# Patient Record
Sex: MALE | Race: WHITE | NOT HISPANIC OR LATINO
[De-identification: names, ages, dates, MRNs, and addresses within clinical notes are randomized per-mention and may not be internally consistent; named-entity substitution may affect disease eponyms.]

---

## 2022-10-25 ENCOUNTER — TRANSCRIPTION ENCOUNTER (OUTPATIENT)
Age: 57
End: 2022-10-25

## 2022-10-25 ENCOUNTER — APPOINTMENT (OUTPATIENT)
Dept: PSYCHIATRY | Facility: CLINIC | Age: 57
End: 2022-10-25

## 2022-10-25 ENCOUNTER — OUTPATIENT (OUTPATIENT)
Dept: OUTPATIENT SERVICES | Facility: HOSPITAL | Age: 57
LOS: 1 days | Discharge: HOME | End: 2022-10-25

## 2022-10-25 DIAGNOSIS — Z81.8 FAMILY HISTORY OF OTHER MENTAL AND BEHAVIORAL DISORDERS: ICD-10-CM

## 2022-10-25 DIAGNOSIS — Z81.1 FAMILY HISTORY OF ALCOHOL ABUSE AND DEPENDENCE: ICD-10-CM

## 2022-10-25 DIAGNOSIS — Z86.79 PERSONAL HISTORY OF OTHER DISEASES OF THE CIRCULATORY SYSTEM: ICD-10-CM

## 2022-10-25 PROCEDURE — 90792 PSYCH DIAG EVAL W/MED SRVCS: CPT | Mod: 95,GC

## 2022-10-25 NOTE — EDUCATION
[Rationale for medication choices, possible risks/precautions, benefits, alternative treatment choices, and consequences of] : Rationale for medication choices, possible risks/precautions, benefits, alternative treatment choices, and consequences of non-treatment discussed with patient/family/caregiver

## 2022-10-25 NOTE — RISK ASSESSMENT
[Clinical Interview] : Clinical Interview [No] : No [Mood disorder] : mood disorder [Family Hx of suicide/suicidal behavior] : family history of suicide/suicidal behavior [None known] : None known [Identifies reasons for living] : identifies reasons for living [Supportive social network of family or friends] : supportive social network of family or friends [Responsibility to children, family, or others] : responsibility to children, family, or others [Beloved pets] : beloved pets [None in the patient's lifetime] : None in the patient's lifetime [None Known] : none known [No known risk factors] : No known risk factors [Residential stability] : residential stability [Relationship stability] : relationship stability [Sobriety] : sobriety

## 2022-10-26 NOTE — PLAN
[Admit to Program     (Add Program Admission information to a new column in the Admit/Discharge Flowsheet)] : Admit to program [Every ___ month(s)] : Medication Management: Every [unfilled] month(s) [Every ___ week(s)] : Psychotherapy: Every [unfilled] week(s) [Individual Therapy] : Individual Therapy [FreeTextEntry4] : -continue bupropion XL 300mg po daily (currently prescribed by PMD)\par -continue zolpidem 10mg po qhs PRN for insomnia (currently prescribed by PMD)\par -titrate off lamotrigine 25mg po daily given questionable benefit (currently prescribed by PMD)\par -f/u in 1 month, on 11/22/22 at 2:15PM

## 2022-10-26 NOTE — FAMILY HISTORY
[FreeTextEntry1] : Mother and father both . Mother  of suicide, was diagnosed as "manic depressive", underwent ECT, medications were unhelpful.

## 2022-10-26 NOTE — DISCUSSION/SUMMARY
[Low acute suicide risk] : Low acute suicide risk [No] : No [Not clinically indicated] : Safety Plan completed/updated (for individuals at risk): Not clinically indicated [FreeTextEntry1] : Patient's risk of self-harm is low due to his lack of suicidal ideation, no prior suicide attempts, mood stability, beloved pets, and access to care

## 2022-10-26 NOTE — HISTORY OF PRESENT ILLNESS
[Not Applicable] : Not applicable [FreeTextEntry1] : Nilson is a 57 year old white male, currently unemployed, former executive in cosmetics industry, domiciled with , with reported psychiatric history of bipolar disorder diagnosed in 2016, with no IPP admissions, presenting to clinic to re-establish psychiatric care after having last seen psychiatrist in 2017.\par \par He reports that he has been seeking out psychiatric care for several years, last saw psychiatrist in 2017 and PMD has been continuing his medications since then. Reports that he was diagnosed as bipolar in 2015 or 2016, started taking medication then. Reports that at time of diagnosis he had lost his job, his cat had , he felt down, found it hard to cope with day-to-day activities, was not caring for himself, not getting out of bed, neglecting hygiene, would either sleep poorly or oversleep. Reports that “manic episodes” would consist of him staying up, working through the night if he had a project to finish. Reports that the longest time he ever spent without sleep was 48 hours. He denies any episodes of persistently elevated mood or energy lasting at least 1 week or for any distinctive episodic period of time in his lifetime. He denies ever experiencing any symptoms suggestive of psychosis.\par \par He reports that starting medications was helpful with regards to his mood, has been on same medication regimen for several years without any changes. He does report that he has gained weight on his medications, and has noticed some changes in libido as well. \par He reports that over the past year he has had multiple stressors and “lots of life changes”, including recently buying an apartment and coordinating moving into it, his father passing away 2 weeks ago, marrying his partner of 20 years in order to avoid losing rights because of unstable political climate.\par \par Reports that he is currently looking for work, but is hesitant of failure, does not have a network of friends in [place], and is concerned about his professional reputation if he is seen working at a retail job when he was previously working at much higher level. 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 Reports sleep is often disrupted, frequently is worrying about different things when trying to go to sleep which impacts his ability to fall asleep, as well as waking up 2-3 times a night. Reports he is a “night owl” and often sleeps from about 3AM-10AM, perhaps 5-6 hours a night. Reports taking ½ pill of zolpidem about 2-3 times a week, which is helpful when he takes it. Reports feeling tired in the morning, drinks coffee to help, with overall energy level “steady”. Denies any appetite or concentration changes. Reports he continues to enjoy spending time with and caring for his cats. Reports some guilt about not working, about where he is financially. States that he uses checklists in order to get himself to complete tasks. He denies any suicidal or homicidal ideation. He denies any suicide attempts or self-harm.\par \par He reports he worries all the time, about things like his finances, cats, reputation. Reports ongoing muscle tension as well. Reports he also at times finds it difficult to stay on task because he is worrying instead. Reports feeling "frustrated, apprehensive, anxious". Reports 2 potential panic attacks in lifetime, last in 2016.\par He denies any symptoms suggestive of PTSD.\par \par He reports that he is interested in medication management and therapy. [FreeTextEntry2] : Dx with bipolar disorder in 2016, however no clear manic episode reported in lifetime. No IPP admissions in lifetime, saw 3 prior psychiatrists, last time was in 2017 - PMD continued prescribing psychiatric medications since then [FreeTextEntry3] : was first started on medications in 1775-8036: wellbutrin ER 300mg, lamotrigine 25 mg daily (started at 50mg, then brought down to 25mg), zolpidem 10mg PRN (1/2 of 1 PRN, says 30 days lasts 3 months. Denies any other medication trials.

## 2022-10-26 NOTE — PSYCHOSOCIAL ASSESSMENT
[Yes, during lifetime] : Yes, during lifetime [Other: _____] : [unfilled] [No] : Have you ever experienced this type of event? No [All substances negative except as specified below] : All substances negative except as specified below [_____] : Frequency: [unfilled] [Unemployed and looking for work] : unemployed and looking for work [Not applicable] : not applicable [Private residence (home, apartment, rooming house, hotel, motel, supported housing, supported Single Room Occupancy (SRO),] : Private residence (home, apartment, rooming house, hotel, motel, supported housing, supported Single Room Occupancy (SRO), permanent housing programs, transient housing programs, and shelter plus care housing) [Client's spouse or domestic partner] : client's spouse or domestic partner [FreeTextEntry1] : Reports he tried "party drugs" in youth but denies any substance use since or currently. Reports he drinks 1 glass of alcohol on occasion.

## 2022-10-26 NOTE — REASON FOR VISIT
[Home] : at home, [unfilled] , at the time of the visit. [Medical Office: ([facility])___] : at the medical office located in  [Self] : self [Patient] : Patient [FreeTextEntry5] : English [FreeTextEntry6] : Nilson [FreeTextEntry2] : medication management and therapy [FreeTextEntry1] : "I've been seeking psychiatric help for several years and haven't been able to get it"

## 2022-10-26 NOTE — SOCIAL HISTORY
[FreeTextEntry1] : Pt has bachelor's degree in history, worked in beauty/cosmetic industry for 30 years, worked at [employer] but currently unemployed. Works as election poll worker seasonally. , living with . Born [place] raised [place], moved to [place], then [place], then [place], then [place], then [place] in 2009. \par Denies any abuse history.\par

## 2022-10-27 ENCOUNTER — NON-APPOINTMENT (OUTPATIENT)
Age: 57
End: 2022-10-27

## 2022-10-27 NOTE — DISCUSSION/SUMMARY
[Initial Plan] : Initial Plan [Able to exercise self-direction] : able to exercise self-direction [Able to set and pursue goals] : able to set and pursue goals [Adherent to treatment recommendations] : adherent to treatment recommendations [Articulate] : articulate [Cognitively intact] : cognitively intact [Insightful] : insightful [Intelligent] : intelligent [Motivated to participate in treatment] : motivated to participate in treatment [Motivated to maintain or improve physical health] : motivated to maintain or improve physical health [In good health] : in good health [Financially stable] : financially stable [Part of a supportive marriage] : part of a supportive marriage [Housing stability] : housing stability [High level of education] : high level of education [English fluency] : English fluency [Connected to healthcare] : connected to healthcare [Mental Health] : Mental Health [Initial] : Initial [every ___ months] : every [unfilled] months [every ___ weeks] : every [unfilled] weeks [A change in level of care is needed as evidenced by:] : A change in level of care is needed as evidenced by: [Yes] : Yes [Psychiatric Provider/Prescriber] : Psychiatric Provider/Prescriber [None - Reason others did not participate:] : None - Reason others did not participate:  [FreeTextEntry2] : 1/30/23 [FreeTextEntry3] : 10/25/22 [FreeTextEntry5] : To have medications optimized and get back to therapy [FreeTextEntry1] : Depression/anxiety [FreeTextEntry4] : reduce feelings of depression and anxiety [de-identified] : complete symptomatic remission with no adjustment in medications over 1 year

## 2022-10-31 ENCOUNTER — APPOINTMENT (OUTPATIENT)
Dept: PSYCHIATRY | Facility: CLINIC | Age: 57
End: 2022-10-31

## 2022-10-31 PROCEDURE — 90834 PSYTX W PT 45 MINUTES: CPT

## 2022-10-31 NOTE — END OF VISIT
[Duration of Psychotherapy Visit (minutes spent in synchronous communication): ____] : Duration of Psychotherapy Visit (minutes spent in synchronous communication): [unfilled] [Individual Psychotherapy for 38-52 minutes] : Individual Psychotherapy for 38 - 52 minutes [Licensed Clinician] : Licensed Clinician

## 2022-11-01 NOTE — RISK ASSESSMENT
[FreeTextEntry8] : despite denying current SI, pt several times referenced not wanting to "become like his mother," who committed suicide 20 years ago. SI/thoughts of death should be further evaluated in subsequent sessions.  [FreeTextEntry7] : no evidence of current violent or homicidal ideation

## 2022-11-01 NOTE — REASON FOR VISIT
[Patient] : Patient [FreeTextEntry1] : Pt requests psychotherapy to focus on anhedonia, low motivation, feelings of "stuckness", processing his father's recent death, feelings of humiliation and guilt, perfectionism

## 2022-11-01 NOTE — PLAN
[Motivational Interviewing] : Motivational Interviewing  [Psychodynamic Therapy] : Psychodynamic Therapy  [Return in ____ week(s)] : Return in [unfilled] week(s) [FreeTextEntry2] : treatment will focus on supporting pt achieving his goals getting work, improving his mood, socializing more, decreasing perfectionism, and increasing motivation.  [de-identified] : Pt arrived early for first session, which focused on collecting information about how pt sees his current difficulties. Writer used open ended questions to elicit history of presenting problems, which pt was able to date back approximately 20 years. He reported a turbulent work history in which he was laid off multiple times after getting a new boss following a promotion. He reports perfectionism manifesting in a variety of ways and, with exploration, was able to see the ways in which this cognitive style can be both a strength and a liability likely contributing to his current psychological situation. Pt was highly engaged throughout session and was an active participant. He was able to make use of interpretation and was open to interventions. He asked relevant questions appropriately and was as forthcoming in responses as he could be. Writer provided psychoeducation on frame and expectations of psychotherapy and pt agreed.  [FreeTextEntry1] : Pt to engage in weekly psychotherapy with the possibility of more intensive treatment to be an ongoing discussion.

## 2022-11-01 NOTE — PHYSICAL EXAM
[Well groomed] : well groomed [Seductive] : seductive [Anxious] : anxious [Full] : full [Rapid] : rapid [Linear/Goal Directed] : linear/goal directed [Average] : average [WNL] : within normal limits [FreeTextEntry1] : very neat [de-identified] : reports wondering if his memory is getting worse, but unable to produce evidence. in fact, to the contrary, pt's memory seems above average.

## 2022-11-07 ENCOUNTER — APPOINTMENT (OUTPATIENT)
Dept: PSYCHIATRY | Facility: CLINIC | Age: 57
End: 2022-11-07

## 2022-11-07 PROCEDURE — 90834 PSYTX W PT 45 MINUTES: CPT

## 2022-11-07 NOTE — PHYSICAL EXAM
[Well groomed] : well groomed [Seductive] : seductive [Anxious] : anxious [Full] : full [Rapid] : rapid [Linear/Goal Directed] : linear/goal directed [Average] : average [WNL] : within normal limits [FreeTextEntry1] : very neat

## 2022-11-07 NOTE — RISK ASSESSMENT
[No, patient denies ideation or behavior] : No, patient denies ideation or behavior [Yes] : Yes [No] : No [Yes, more than three months ago] : Yes, more than three months ago [FreeTextEntry8] : d [FreeTextEntry7] : no evidence of current violent or homicidal ideation [TextBox_32] : pt denied a thoughts of suicide since 2018. reports that at that time, after he lost his job, had a number of surgeries, and his long-time cat , he had some thoughts that others might be better off if he were dead. Reports he ruled out using a gun or a knife because "they would be too messy" but denied have any actual plans to take his life. he reports that he at that time he did think about how he would give away his watch collection i.e., to whom he might give various watches. However, he never had active thoughts of suicide. he reports that he was off his psychiatric meds at that time, and since going back on them, he has not had any suicidal ideation.

## 2022-11-07 NOTE — PLAN
[Motivational Interviewing] : Motivational Interviewing  [Psychodynamic Therapy] : Psychodynamic Therapy  [Return in ____ week(s)] : Return in [unfilled] week(s) [FreeTextEntry2] : treatment will focus on supporting pt achieving his goals getting work, improving his mood, socializing more, decreasing perfectionism, and increasing motivation.  [de-identified] : Pt discussed his fears of becoming like his mother, noting similarities in terms of her depression, their similar looks, her way of telling stories, his weight gain, and his occasional lethargy and not leaving the house. Pt discussed his vocational history and current precarious financial situation and expressed ambivalence about applying for a new job. 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 Writer provided safe space for exploration, encouraged pt to vocalize any suicidal ideation that may come up in the future, helped pt to clarify his ambivalence about getting a job and see the ways in which he wants and does not want to get a job.  Pt was forthcoming and engaged throughout session, vocalizing appreciation for writer's time.  [FreeTextEntry1] : Pt to engage in weekly psychotherapy. Possibility that pt is avoiding painful emotions should be explored.

## 2022-11-10 DIAGNOSIS — F31.9 BIPOLAR DISORDER, UNSPECIFIED: ICD-10-CM

## 2022-11-14 ENCOUNTER — APPOINTMENT (OUTPATIENT)
Dept: PSYCHIATRY | Facility: CLINIC | Age: 57
End: 2022-11-14

## 2022-11-14 PROCEDURE — 90834 PSYTX W PT 45 MINUTES: CPT

## 2022-11-14 NOTE — END OF VISIT
[Individual Psychotherapy for 38-52 minutes] : Individual Psychotherapy for 38 - 52 minutes [Licensed Clinician] : Licensed Clinician [Duration of Psychotherapy Visit (minutes spent in synchronous communication): ____] : Duration of Psychotherapy Visit (minutes spent in synchronous communication): [unfilled]

## 2022-11-14 NOTE — PHYSICAL EXAM
[Well groomed] : well groomed [Average] : average [Anxious] : anxious [Full] : full [Rapid] : rapid [Linear/Goal Directed] : linear/goal directed [WNL] : within normal limits [Cooperative] : cooperative [FreeTextEntry8] : frustrated [de-identified] : tearful at times

## 2022-11-14 NOTE — RISK ASSESSMENT
[Yes] : Yes [No] : No [Yes, more than three months ago] : Yes, more than three months ago [TextBox_32] : pt denied a thoughts of suicide since 2018. reports that at that time, after he lost his job, had a number of surgeries, and his long-time cat , he had some thoughts that others might be better off if he were dead. Reports he ruled out using a gun or a knife because "they would be too messy" but denied have any actual plans to take his life. he reports that he at that time he did think about how he would give away his watch collection i.e., to whom he might give various watches. However, he never had active thoughts of suicide. he reports that he was off his psychiatric meds at that time, and since going back on them, he has not had any suicidal ideation. [FreeTextEntry8] : no evidence of risk of suicidality at this time [FreeTextEntry7] : no evidence of current violent or homicidal ideation

## 2022-11-14 NOTE — PLAN
[Psychodynamic Therapy] : Psychodynamic Therapy  [Return in ____ week(s)] : Return in [unfilled] week(s) [FreeTextEntry2] : treatment will focus on supporting pt achieving his goals getting work, improving his mood, socializing more, decreasing perfectionism, and increasing motivation. The possibility of 2x/weekly therapy should be a discussed.  [Psychoeducation] : Psychoeducation  [de-identified] : Pt discussed his frustration with the political climate, his recent experience as a poll worker, and his proneness to get frustrated quickly. Dyad discussed patient's harsh criticism and difficulty finding compassion for himself. He also discussed recent increases in frequency of crying over the past year. Pt discussed his avoidance of memories or media that might make him tearful, which was also true for his avoidance of discussing his father's recent death.  Pt expressed concern about upcoming apt with psychiatric provider and how to handle a prescription from a provider who would soon be transferring his care to another. Writer encouraged him to advocate for himself. Pt was cooperative and open to therapeutic inquiry. Session concluded with pt in good emotional control.  [FreeTextEntry1] : Continue weekly psychotherapy.

## 2022-11-21 ENCOUNTER — APPOINTMENT (OUTPATIENT)
Dept: PSYCHIATRY | Facility: CLINIC | Age: 57
End: 2022-11-21

## 2022-11-21 PROCEDURE — 90834 PSYTX W PT 45 MINUTES: CPT

## 2022-11-21 NOTE — RISK ASSESSMENT
[FreeTextEntry8] : no evidence of risk of suicidality at this time [FreeTextEntry7] : no evidence of current violent or homicidal ideation

## 2022-11-21 NOTE — PHYSICAL EXAM
[Well groomed] : well groomed [Average] : average [Cooperative] : cooperative [Anxious] : anxious [Full] : full [Rapid] : rapid [Linear/Goal Directed] : linear/goal directed [WNL] : within normal limits [FreeTextEntry8] : frustrated [de-identified] : tearful at times

## 2022-11-21 NOTE — PLAN
[FreeTextEntry2] : treatment will focus on supporting pt achieving his goals getting work, improving his mood, socializing more, decreasing perfectionism, and increasing motivation. possibility of 2x/weekly therapy was discussed.  [Psychodynamic Therapy] : Psychodynamic Therapy  [Psychoeducation] : Psychoeducation  [de-identified] : Pt arrived for session on time and wanted to discuss end of last session, in which he had inquired with writer about the nature of documentation. Dyad discussed the purposes of documentation and the ways in which it does and does not encapsulate the work of psychotherapy. Writer provided further psychoeducation on the nature of the frame of psychotherapy. Pt explored needs for structure and order in various contexts as well as his possible interpersonal impact on others, e.g., first impressions. Pt was cooperative and open to therapeutic inquiry. Session concluded with pt in good emotional control. Writer brought up possibility of 2x/week therapy and pt stated he would consider it and discuss next week.  [Return in ____ week(s)] : Return in [unfilled] week(s) [FreeTextEntry1] : Continue weekly psychotherapy.

## 2022-11-22 ENCOUNTER — APPOINTMENT (OUTPATIENT)
Dept: PSYCHIATRY | Facility: CLINIC | Age: 57
End: 2022-11-22

## 2022-11-22 NOTE — SOCIAL HISTORY
[FreeTextEntry1] : Pt has bachelor's degree in history, worked in beauty/cosmetic industry for 30 years, worked at [employer] but currently unemployed. Works as election poll worker seasonally. , living with . [place], moved to [place], then [place], then [place], then [place], then [place] in 2009. \par Denies any abuse history.\par

## 2022-11-22 NOTE — REASON FOR VISIT
[Home] : at home, [unfilled] , at the time of the visit. [Medical Office: ([facility])___] : at the medical office located in  [Self] : self [Patient] : Patient [FreeTextEntry2] : Chuy Luna

## 2022-11-22 NOTE — DISCUSSION/SUMMARY
[FreeTextEntry1] : Nilson is a 57 year old white male, currently unemployed, former executive in cosmetics industry, domiciled with , with reported psychiatric history of bipolar disorder diagnosed in 2016, with no IPP admissions, initially presented to clinic to re-establish psychiatric care after having last seen psychiatrist in 2017 for medication management as well as therapy, presenting today for follow-up.\par On initial evaluation, while patient presented with symptoms suggestive of generalized anxiety disorder, he does not appear acutely depressed, suicidal, manic, or psychotic. While patient reported diagnosis of bipolar disorder, this seems unlikely given no evidence of any lifetime manic episode, and atypical timing of reported initial presentation. Further, patient's medication regimen would not likely have been effective in treatment of bipolar disorder. Patient more likely has major depressive disorder, though can keep bipolar spectrum disorder on differential.\par Patient reports generally stable mood on current medication regimen though has been dealing with multiple recent psychosocial stressors. He is likely to benefit from continuing therapy in order to strengthen coping skills in the face of these stressors. Given unlikely benefit from very low dose of lamotrigine, will taper off and monitor response. Will trial PRN trazodone as alternative to PRN zolpidem to target insomnia, can start at low dose and titrate based on response and tolerability. Can consider SSRI trial in future if patient remains stable off of lamotrigine and mood/anxious symptoms persist. Patient remains appropriate for outpatient level of care at this time.

## 2022-11-22 NOTE — HISTORY OF PRESENT ILLNESS
[Not Applicable] : Not applicable [FreeTextEntry1] : Nilson reports that he has been enjoying therapy thus far. States he just got refill of medication from PMD, thus far did not change anything with regards to his medications, has continued to take lamictal 25mg daily since last visit. However, reports being agreeable to try coming off of it given questionable benefit.\par He denies any significant changes in his mood, though states therapy has been helpful to him thus far.\par Reports that he continues to have days where he will start doing something around the house, and will keep doing it for several hours. Will also do things like clean the kitchen to his liking even if his partner has already cleaned it.\par States that he is getting up earlier every day, has difficulty going back to sleep when he is up.\par Reports that he has taken any zolpidem for about 2 months, though talks in his sleep when he does take it, open to trying different medication to target sleep issues when he does get insomnia.\par Patient denies any symptoms suggestive of alicia or psychosis. Denies any suicidal or homicidal ideation at this time. \par Transition of care discussed. [FreeTextEntry2] : Dx with bipolar disorder in 2016, however no clear manic episode reported in lifetime. No IPP admissions in lifetime, saw 3 prior psychiatrists, last time was in 2017 - PMD continued prescribing psychiatric medications since then.  [FreeTextEntry3] : was first started on medications in [id number]: wellbutrin ER 300mg, lamotrigine 25 mg daily (started at 50mg, then brought down to 25mg), zolpidem 10mg PRN (1/2 of 1 PRN, says 30 days lasts 3 months. Denies any other medication trials.

## 2022-11-22 NOTE — PLAN
[Medication education provided] : Medication education provided. [Rationale for medication choices, possible risks/precautions, benefits, alternative treatment choices, and consequences of non-treatment discussed] : Rationale for medication choices, possible risks/precautions, benefits, alternative treatment choices, and consequences of non-treatment discussed with patient/family/caregiver  [Yes. details: ___] : Yes, [unfilled] [FreeTextEntry4] : improving mood [FreeTextEntry5] : -continue bupropion XL 300mg po daily (currently prescribed by PMD)\par -hold zolpidem 10mg po qhs PRN for insomnia (currently prescribed by PMD)\par -start trazodone 25-50mg po qhs PRN for insomnia\par -titrate off lamotrigine 25mg po daily given questionable benefit (currently prescribed by PMD)\par -f/u in ~1 month with new psychiatrist

## 2022-11-28 ENCOUNTER — APPOINTMENT (OUTPATIENT)
Dept: PSYCHIATRY | Facility: CLINIC | Age: 57
End: 2022-11-28

## 2022-11-28 PROCEDURE — 90834 PSYTX W PT 45 MINUTES: CPT

## 2022-11-28 NOTE — PHYSICAL EXAM
[Well groomed] : well groomed [Average] : average [Cooperative] : cooperative [Anxious] : anxious [Full] : full [Rapid] : rapid [Linear/Goal Directed] : linear/goal directed [WNL] : within normal limits [FreeTextEntry8] : frustrated [de-identified] : tearful at times

## 2022-11-28 NOTE — PLAN
[FreeTextEntry2] : treatment will focus on supporting pt achieving his goals getting work, improving his mood, socializing more, decreasing perfectionism, and increasing motivation. possibility of 2x/weekly therapy was discussed.  [Psychodynamic Therapy] : Psychodynamic Therapy  [Psychoeducation] : Psychoeducation  [de-identified] : Pt arrived for session five minutes late. He discussed his recent thanksgiving, which he spent at home with his partner. Session focused on discussion of traditions in his family of origin, and the meaning of his mother's suicide. Dyad also discussed patterns in pt's relationship of things going unsaid, both good and bad. Dyad processed pt's pattern of focusing on rules, structures, and logistics at the expense of emotions. Pt was cooperative and open to therapeutic inquiry. Session concluded with pt in good emotional control. Writer brought up possibility of 2x/week therapy and pt stated he still felt ambivalent and would discuss further next session.  [Return in ____ week(s)] : Return in [unfilled] week(s) [FreeTextEntry1] : Continue weekly psychotherapy.

## 2022-12-05 ENCOUNTER — APPOINTMENT (OUTPATIENT)
Dept: PSYCHIATRY | Facility: CLINIC | Age: 57
End: 2022-12-05

## 2022-12-05 PROCEDURE — 90834 PSYTX W PT 45 MINUTES: CPT

## 2022-12-05 NOTE — PHYSICAL EXAM
[Well groomed] : well groomed [Average] : average [Cooperative] : cooperative [Anxious] : anxious [Full] : full [Rapid] : rapid [Linear/Goal Directed] : linear/goal directed [WNL] : within normal limits [FreeTextEntry8] : frustrated [de-identified] : tearful at times

## 2022-12-05 NOTE — PLAN
[Psychodynamic Therapy] : Psychodynamic Therapy  [Psychoeducation] : Psychoeducation  [Return in ____ week(s)] : Return in [unfilled] week(s) [FreeTextEntry2] : treatment will focus on supporting pt achieving his goals getting work, improving his mood, socializing more, decreasing perfectionism, and increasing motivation. possibility of 2x/weekly therapy was discussed.  [de-identified] : Pt arrived for session. Began with discussion of 2x/wk therapy. With exploration, pt eventually stated that he did not want to do twice weekly therapy yet, that he didn't feel sufficiently comfortable yet. Writer validated pt and encouraged further honesty. Pt also discussed a propensity for being so moved by various movies/plays/musicals, that he is moved to uncontrollable tears. Dyad discussed patterns of being touched by the emotions of characters, particularly of being an only child and of feeling like he doesn't fit in with others due to his sexuality. Pt discussed the meaning he takes from caring for his cats and dyad discussed his difficulty caring for himself, and the ways in which he struggles to let his partner "down in the hole with him" when he is depressed. Pt was cooperative and open to therapeutic inquiry. Session concluded with pt in good emotional control.  [FreeTextEntry1] : Continue weekly psychotherapy.

## 2022-12-12 ENCOUNTER — APPOINTMENT (OUTPATIENT)
Dept: PSYCHIATRY | Facility: CLINIC | Age: 57
End: 2022-12-12

## 2022-12-14 ENCOUNTER — NON-APPOINTMENT (OUTPATIENT)
Age: 57
End: 2022-12-14

## 2022-12-14 ENCOUNTER — APPOINTMENT (OUTPATIENT)
Dept: PSYCHIATRY | Facility: CLINIC | Age: 57
End: 2022-12-14

## 2022-12-14 NOTE — DISCUSSION/SUMMARY
[FreeTextEntry1] : Spoke to patient after writer had to unexpectedly cancel our first apptmt.  Patient stating that he will be okay until we can meet at the end of December.  Spoke of how Lamictal had been stopped and he had tried Trazodone for sleep (instead of Ambien) but it made him feel very awake so will just use prn Ambiem that he has for now.  \par Will see Chuy Dec 29th at 2 pm\par

## 2022-12-19 ENCOUNTER — APPOINTMENT (OUTPATIENT)
Dept: PSYCHIATRY | Facility: CLINIC | Age: 57
End: 2022-12-19

## 2022-12-19 PROCEDURE — 90834 PSYTX W PT 45 MINUTES: CPT

## 2022-12-19 NOTE — PLAN
[FreeTextEntry2] : treatment will focus on supporting pt achieving his goals getting work, improving his mood, socializing more, decreasing perfectionism, and increasing motivation. possibility of 2x/weekly therapy was discussed.  [Psychodynamic Therapy] : Psychodynamic Therapy  [Psychoeducation] : Psychoeducation  [de-identified] : Pt arrived for session. Session focused on discussing pt's early developmental history and experiences of love after he found a quote about grief that resonated with him, "love with no place for it to go." Also discussed pt's reluctance to apply for jobs due to debilitating experiences of shame. Processed the vulnerability of applying for jobs that he is overqualified for and the exponentially worse shame of being rejected for one of those jobs. Pt was cooperative and open to therapeutic inquiry. Session concluded with pt in good emotional control.  [Return in ____ week(s)] : Return in [unfilled] week(s) [FreeTextEntry1] : Continue weekly psychotherapy.

## 2022-12-19 NOTE — PHYSICAL EXAM
[Well groomed] : well groomed [Average] : average [Cooperative] : cooperative [Anxious] : anxious [Full] : full [Rapid] : rapid [Linear/Goal Directed] : linear/goal directed [WNL] : within normal limits [FreeTextEntry8] : frustrated [de-identified] : tearful at times

## 2022-12-28 ENCOUNTER — APPOINTMENT (OUTPATIENT)
Dept: PSYCHIATRY | Facility: CLINIC | Age: 57
End: 2022-12-28

## 2022-12-28 PROCEDURE — 90834 PSYTX W PT 45 MINUTES: CPT | Mod: 95

## 2022-12-28 NOTE — PHYSICAL EXAM
[Well groomed] : well groomed [Average] : average [Cooperative] : cooperative [Anxious] : anxious [Full] : full [Rapid] : rapid [Linear/Goal Directed] : linear/goal directed [WNL] : within normal limits [FreeTextEntry8] : frustrated [de-identified] : tearful at times

## 2022-12-28 NOTE — PLAN
[FreeTextEntry2] : treatment will focus on supporting pt achieving his goals getting work, improving his mood, socializing more, decreasing perfectionism, and increasing motivation. possibility of 2x/weekly therapy was discussed.  [Psychodynamic Therapy] : Psychodynamic Therapy  [Psychoeducation] : Psychoeducation  [de-identified] : Pt arrived for session. Session focused on exploring pt's experience of self-consciousness and insecurity. Pt reports feeling isolated and struggling with friendships beyond his partner. Pt able to engage in superficial relationships but struggles to move them beyond these interactions into increased intimacy. Explored how this is related to his experience growing up as a "pasty, husky kid." Also explored how this increases the difficulty of trying to find a job due to the vulnerability and fear of failure. Dyad discussed trying to process pt's experience of being "on" in session with writer and how this might affect his experience of authenticity and intimacy in therapeutic relationship. Pt was cooperative and open to therapeutic inquiry. Session concluded with pt in good emotional control.  [Return in ____ week(s)] : Return in [unfilled] week(s) [FreeTextEntry1] : Continue weekly psychotherapy.

## 2022-12-28 NOTE — REASON FOR VISIT
[Home] : at home, [unfilled] , at the time of the visit. [Other Location: e.g. Home (Enter Location, City,State)___] : at [unfilled] [Self] : self [FreeTextEntry4] : Yrn Tucker, phd [Patient] : Patient [FreeTextEntry1] : Pt requests psychotherapy to focus on anhedonia, low motivation, feelings of "stuckness", processing his father's recent death, feelings of humiliation and guilt, perfectionism

## 2022-12-29 ENCOUNTER — APPOINTMENT (OUTPATIENT)
Dept: PSYCHIATRY | Facility: CLINIC | Age: 57
End: 2022-12-29
Payer: MEDICAID

## 2022-12-29 PROCEDURE — 99215 OFFICE O/P EST HI 40 MIN: CPT | Mod: 95

## 2022-12-29 PROCEDURE — 99417 PROLNG OP E/M EACH 15 MIN: CPT | Mod: 95

## 2022-12-29 RX ORDER — TRAZODONE HYDROCHLORIDE 50 MG/1
50 TABLET ORAL
Qty: 30 | Refills: 0 | Status: DISCONTINUED | COMMUNITY
Start: 2022-11-22 | End: 2022-12-29

## 2022-12-29 NOTE — REASON FOR VISIT
[Home] : at home, [unfilled] , at the time of the visit. [Patient] : the patient [Self] : self [Other Location: e.g. Home (Enter Location, City,State)___] : at [unfilled] [FreeTextEntry2] : Chuy Luna

## 2022-12-29 NOTE — DISCUSSION/SUMMARY
[FreeTextEntry1] : Nilson is a 57 year old white male, currently unemployed, former executive in cosmetics industry, domiciled with , with reported psychiatric history of bipolar disorder diagnosed in 2016, with no IPP admissions, initially presented to clinic to re-establish psychiatric care after having last seen psychiatrist in 2017 for medication management as well as therapy, presenting today for follow-up.\par On initial evaluation, while patient presented with symptoms suggestive of generalized anxiety disorder, he does not appear acutely depressed, suicidal, manic, or psychotic. While patient reported diagnosis of bipolar disorder, classic Bipolar Illness seemed unlikely given no evidence of any lifetime manic episode, and atypical timing of reported initial presentation.  Further, patient's medication regimen (Lamictal 25 mg daily) would not likely have been effective in treatment of Bipolar Disorder. Patient more likely has Major Depressive Disorder, though Cyclothymia or Bipolar Spectrum Disorder possible. \par Patient reports some dysphoria and anxiety as well as some fluctuating mood with periods of increased attention to projects and details.

## 2022-12-29 NOTE — PLAN
[Yes. details: ___] : Yes, [unfilled] [Medication education provided] : Medication education provided. [Rationale for medication choices, possible risks/precautions, benefits, alternative treatment choices, and consequences of non-treatment discussed] : Rationale for medication choices, possible risks/precautions, benefits, alternative treatment choices, and consequences of non-treatment discussed with patient/family/caregiver  [FreeTextEntry5] : Offered support/encouragement, psychoeducation, counseling regarding: diagnosis/diagnoses, medications, symptoms, recommendations etc.\par \par Reviewed/discussed plan below:\par \par -Increase Bupropion XL  to 450 mg po daily; writer awaiting information re: pt's pharmacy\par -May take Ambien 5 mg prn but only minimally at 1-2 times a week maximum\par -May hold Trazodone as pt stated he felt more awake on this\par -May hold stay off Lamictal though discussed consideration for restarting and titrating up further for mood stabilization due to some potential limited cycling\par -Pt to journal regarding moods, energy, sleep etc\par -COntinue weekly therapy\par -May collaborate with pt's therapist\par -Contact writer prn\par \par 60+ minutes spent reviewing prior records/notes, seeing patient and recording session note\par

## 2022-12-29 NOTE — HISTORY OF PRESENT ILLNESS
[Not Applicable] : Not applicable [FreeTextEntry1] : Nilson reports that he has some periods of anxiety and relative dysphoria and also times of feeling up and focused intently on getting something done.  States sometimes when he starts doing something he gets consumed for long periods and cannot stop.  Has some issues with restless or limited sleep but usually gets about 6 hrs a night per reports.  Stated his partner does not like when he takes Ambien (5 mg on occasion because he talks in his sleep when he takes it.\par He states he does not notice a difference off of Lamictal and discussed potential use of journaling with focus on moods and feelings as well as sleep quality etc to better pinpoint patterns.  \par   [FreeTextEntry2] : Dx with bipolar disorder in 2016, however no clear manic episode reported in lifetime. No IPP admissions in lifetime, saw 3 prior psychiatrists, last time was in 2017 - PMD continued prescribing psychiatric medications since then.  [FreeTextEntry3] : was first started on medications in [id number]: wellbutrin ER 300mg, lamotrigine 25 mg daily (started at 50mg, then brought down to 25mg), zolpidem 10mg PRN (1/2 of 1 PRN, says 30 days lasts 3 months. Denies any other medication trials.

## 2022-12-29 NOTE — REVIEW OF SYSTEMS
[Negative] : Allergic/Immunologic [FreeTextEntry9] : muscle tension [de-identified] : some anxiety and mood fluctuations

## 2022-12-29 NOTE — SOCIAL HISTORY
[FreeTextEntry1] : Pt has bachelor's degree in history, worked in beauty/cosmetic industry for 30 years, worked at [employer] but currently unemployed. Works as election poll worker seasonally. , living with . Born [place] raised [place], moved to [place], then [place], then [place], then [place], then [place] in 2009. \par Denies any abuse history.\par

## 2022-12-29 NOTE — PHYSICAL EXAM
[None] : none [Average] : average [Cooperative] : cooperative [Full] : full [Clear] : clear [Linear/Goal Directed] : linear/goal directed [WNL] : within normal limits [FreeTextEntry8] : stable

## 2022-12-30 RX ORDER — LAMOTRIGINE 25 MG/1
25 TABLET ORAL
Qty: 90 | Refills: 0 | Status: DISCONTINUED | COMMUNITY
Start: 2022-11-17 | End: 2022-12-30

## 2023-01-04 ENCOUNTER — APPOINTMENT (OUTPATIENT)
Dept: PSYCHIATRY | Facility: CLINIC | Age: 58
End: 2023-01-04
Payer: MEDICAID

## 2023-01-04 PROCEDURE — 90834 PSYTX W PT 45 MINUTES: CPT | Mod: 95

## 2023-01-04 NOTE — REASON FOR VISIT
[Home] : at home, [unfilled] , at the time of the visit. [Other Location: e.g. Home (Enter Location, City,State)___] : at [unfilled] [Patient] : the patient [Self] : self [FreeTextEntry1] : Pt requests psychotherapy to focus on anhedonia, low motivation, feelings of "stuckness", processing his father's recent death, feelings of humiliation and guilt, perfectionism [FreeTextEntry4] : Yrn Tucker, phd

## 2023-01-04 NOTE — PHYSICAL EXAM
[Well groomed] : well groomed [Average] : average [Cooperative] : cooperative [Anxious] : anxious [Full] : full [Rapid] : rapid [Linear/Goal Directed] : linear/goal directed [WNL] : within normal limits [FreeTextEntry8] : frustrated [de-identified] : tearful at times

## 2023-01-04 NOTE — PLAN
[Psychodynamic Therapy] : Psychodynamic Therapy  [Psychoeducation] : Psychoeducation  [Return in ____ week(s)] : Return in [unfilled] week(s) [FreeTextEntry2] : treatment will focus on supporting pt achieving his goals getting work, improving his mood, socializing more, decreasing perfectionism, and increasing motivation. possibility of 2x/weekly therapy was discussed.  [de-identified] : Pt arrived for session. Session focused on exploring pt's experience of perfectionism and how this prevents him from making steps toward applying to jobs due to "fear of failure." Pt was able to acknowledge how black and white his sense of success/failure is. He tied this to his upbringing as an only child. He was able to acknowledge the extent to which this is preventing him from making progress toward his professional and personal goals. Writer provided validation of the intensity of pt's self-criticism. He also raised interest in beginning therapy twice per week, which we agreed to discuss further during next session. Pt was cooperative and open to therapeutic inquiry. Session concluded with pt in good emotional control.  [FreeTextEntry1] : Continue weekly psychotherapy and medication management.

## 2023-01-09 ENCOUNTER — APPOINTMENT (OUTPATIENT)
Dept: PSYCHIATRY | Facility: CLINIC | Age: 58
End: 2023-01-09
Payer: MEDICAID

## 2023-01-09 PROCEDURE — 90834 PSYTX W PT 45 MINUTES: CPT

## 2023-01-09 NOTE — RISK ASSESSMENT
[FreeTextEntry8] : pt denied thoughts of suicide [FreeTextEntry7] : no evidence of current violent or homicidal ideation

## 2023-01-09 NOTE — PHYSICAL EXAM
[Well groomed] : well groomed [Average] : average [Cooperative] : cooperative [Anxious] : anxious [Full] : full [Rapid] : rapid [Linear/Goal Directed] : linear/goal directed [WNL] : within normal limits [FreeTextEntry8] : frustrated [de-identified] : tearful at times

## 2023-01-09 NOTE — PLAN
[Psychodynamic Therapy] : Psychodynamic Therapy  [Psychoeducation] : Psychoeducation  [Return in ____ week(s)] : Return in [unfilled] week(s) [FreeTextEntry2] : treatment will focus on supporting pt achieving his goals getting work, improving his mood, socializing more, decreasing perfectionism, and increasing motivation. possibility of 2x/weekly therapy was discussed.  [de-identified] : Pt arrived for session. Pt stated desire to begin twice weekly sessions, which writer agreed to. Session focused on exploring pt's shame around loss of status and lifestyle since losing his job. Pt reported over the weekend having severely sad experience and having some physiological anxiety/panic sx as well. Was able to utilize breathing and distraction techniques to re-regulate. Utilized feminist approach to understand pt's isolation and reticence to accept emotional support from others. Explored pt's relationship with his partner and their patterns of intellectualized and non-emotive communication. Explored how pt's experience as only child leads him to prefer to "solve" his problems on his own rather than solicit support from others. Would be useful material for future session to how this relates to therapy experience. Pt's shame is exacerbated by partner's resentment.  Pt was cooperative and open to therapeutic inquiry. Session concluded with pt in good emotional control.  [FreeTextEntry1] : Begin twice weekly psychotherapy and medication management.

## 2023-01-19 ENCOUNTER — APPOINTMENT (OUTPATIENT)
Dept: PSYCHIATRY | Facility: CLINIC | Age: 58
End: 2023-01-19
Payer: MEDICAID

## 2023-01-19 PROCEDURE — 90834 PSYTX W PT 45 MINUTES: CPT | Mod: 95

## 2023-01-19 NOTE — REASON FOR VISIT
[Patient preference] : as per patient preference [Continuing, patient seen in-person within last 12 months] : Telehealth services are continuing as patient has been seen in-person within last 12 months. [Telehealth (audio & video) - Individual/Group] : This visit was provided via telehealth using real-time 2-way audio visual technology. [Other Location: e.g. Home (Enter Location, City,State)___] : The provider was located at [unfilled]. [Home] : The patient, [unfilled], was located at home, [unfilled], at the time of the visit. [Verbal consent obtained from patient/other participant(s)] : Verbal consent for telehealth/telephonic services obtained from patient/other participant(s) [FreeTextEntry4] : 11am [FreeTextEntry5] : [id number] [FreeTextEntry2] : 1/9/23 [Patient] : Patient [FreeTextEntry1] : Pt requests psychotherapy to focus on anhedonia, low motivation, feelings of "stuckness", processing his father's recent death, feelings of humiliation and guilt, perfectionism

## 2023-01-19 NOTE — PLAN
[FreeTextEntry2] : treatment will focus on supporting pt achieving his goals getting work, improving his mood, socializing more, decreasing perfectionism, and increasing motivation. possibility of 2x/weekly therapy was discussed.  [Psychodynamic Therapy] : Psychodynamic Therapy  [Psychoeducation] : Psychoeducation  [de-identified] : Pt arrived for session. Session focused on exploring pt's relationship with his partner, particularly how their communication breaks down due to the activation of each other's insecurities and vulnerabilities. explored the ways in which pt unintentionally communicates judgment and aggression in his body language and tone. Explored pt's vulnerability about his appearance and how his depression has made it harder for him to care for himself in terms of these vulnerabilities about his appearance, e.g., weight, hair. However, pt does report using some behavioral activation strategies such as daily showering, alarms, chores etc.  Pt was cooperative and open to therapeutic inquiry. Session concluded with pt in good emotional control.  [Return in ____ week(s)] : Return in [unfilled] week(s) [FreeTextEntry1] : Continue twice weekly psychotherapy and medication management.

## 2023-01-19 NOTE — PHYSICAL EXAM
[Well groomed] : well groomed [Average] : average [Cooperative] : cooperative [Anxious] : anxious [Full] : full [Rapid] : rapid [Linear/Goal Directed] : linear/goal directed [WNL] : within normal limits [FreeTextEntry8] : frustrated [de-identified] : tearful at times

## 2023-01-23 ENCOUNTER — APPOINTMENT (OUTPATIENT)
Dept: PSYCHIATRY | Facility: CLINIC | Age: 58
End: 2023-01-23
Payer: MEDICAID

## 2023-01-23 PROCEDURE — 90834 PSYTX W PT 45 MINUTES: CPT

## 2023-01-23 NOTE — PHYSICAL EXAM
[Well groomed] : well groomed [Average] : average [Cooperative] : cooperative [Anxious] : anxious [Full] : full [Rapid] : rapid [Linear/Goal Directed] : linear/goal directed [WNL] : within normal limits [FreeTextEntry8] : frustrated [de-identified] : tearful at times

## 2023-01-23 NOTE — PLAN
[FreeTextEntry2] : treatment will focus on supporting pt achieving his goals getting work, improving his mood, socializing more, decreasing perfectionism, and increasing motivation. [Psychodynamic Therapy] : Psychodynamic Therapy  [Psychoeducation] : Psychoeducation  [de-identified] : Pt arrived on time for session. Pt reported decreased depressive sx, increased energy, but also some difficulty sleeping. Also reported some increased anxiety/rumination/shame, which writer helped pt actually conceptualize as progress from the deeper depression/numbness he was experiencing a few months in the past. Discussed pt's experience of feeling "chubby" throughout his life and how it related to his relationship with his mother and history of self-consciousness. Discussed pt's experience of shame and avoidance of applying for job's due to fear of criticism and the possibility people in his field will think "what is wrong with him."  Pt was cooperative and open to therapeutic inquiry. Session concluded with pt in good emotional control.  [Return in ____ week(s)] : Return in [unfilled] week(s) [FreeTextEntry1] : continue twice weekly psychotherapy and medication management. discuss possibility of group therapy.

## 2023-01-24 ENCOUNTER — RX RENEWAL (OUTPATIENT)
Age: 58
End: 2023-01-24

## 2023-01-26 ENCOUNTER — APPOINTMENT (OUTPATIENT)
Dept: PSYCHIATRY | Facility: CLINIC | Age: 58
End: 2023-01-26
Payer: MEDICAID

## 2023-01-26 PROCEDURE — 90834 PSYTX W PT 45 MINUTES: CPT | Mod: 95

## 2023-01-26 PROCEDURE — 99215 OFFICE O/P EST HI 40 MIN: CPT | Mod: 95

## 2023-01-26 NOTE — PLAN
[Psychodynamic Therapy] : Psychodynamic Therapy  [Psychoeducation] : Psychoeducation  [Return in ____ week(s)] : Return in [unfilled] week(s) [FreeTextEntry2] : treatment will focus on supporting pt achieving his goals getting work, improving his mood, socializing more, decreasing perfectionism, and increasing motivation. [de-identified] : Pt arrived on time for session. Session focused on exploring pt's low self-esteem and it's origins. Explored patterns of pt feeling there is something wrong with him, and how overwhelming experiences of shame and self-flagellation paralyze him and prevent him from taking the vulnerable step toward applying for new jobs. Discussed the fragility of pt's self-esteem and his reliance on others to maintain self-esteem. Discussed the consequence of losing his job as him internalizing that it must have been because there was something wrong with him. Discussed the difficulty of self-compassion and how once self-flagellation was motivating and successful, but now it is not anymore, and only results in paralyzing him and "beating him into the ground."  Pt was cooperative and open to therapeutic inquiry. Session concluded with pt in good emotional control.  [FreeTextEntry1] : continue twice weekly psychotherapy and medication management. discuss possibility of group therapy. [Yes. details: ___] : Yes, [unfilled] [Medication education provided] : Medication education provided. [Rationale for medication choices, possible risks/precautions, benefits, alternative treatment choices, and consequences of non-treatment discussed] : Rationale for medication choices, possible risks/precautions, benefits, alternative treatment choices, and consequences of non-treatment discussed with patient/family/caregiver  [FreeTextEntry5] : Offered support/encouragement, psychoeducation, counseling regarding: diagnosis/diagnoses, medications, symptoms, recommendations etc.\par \par Reviewed/discussed plan below:\par \par -Continue Bupropion XL  at 450 mg po daily; writer\par -May take Ambien 5 mg prn but only minimally at no more than 2 times a week maximum\par -Reviewed potential to try:Lamictal or Trileptal (if Bipolar spectrum being entertained) versus hydroxyzine or gabapentin (if only wanting to target sleep and anxiety for now)\par -Pt agreed to try hydroxyzine for now\par -May hold Trazodone as pt stated he felt more awake on this\par -Pt to journal regarding moods, energy, sleep etc and to further discuss at next session\par -Continue weekly therapy\par -May collaborate with pt's therapist prn\par -Contact writer prn\par \par 40 minutes spent reviewing prior records/notes, seeing patient and recording session note\par

## 2023-01-26 NOTE — REVIEW OF SYSTEMS
[Negative] : Allergic/Immunologic [FreeTextEntry9] : muscle tension [de-identified] : some anxiety and mood fluctuations

## 2023-01-26 NOTE — END OF VISIT
[Duration of Psychotherapy Visit (minutes spent in synchronous communication): ____] : Duration of Psychotherapy Visit (minutes spent in synchronous communication): [unfilled] [Individual Psychotherapy for 38-52 minutes] : Individual Psychotherapy for 38 - 52 minutes [Licensed Clinician] : Licensed Clinician [Time Spent: ___ minutes] : I have spent [unfilled] minutes of time on the encounter.

## 2023-01-26 NOTE — PHYSICAL EXAM
[Well groomed] : well groomed [Anxious] : anxious [Rapid] : rapid [de-identified] : tearful at times [Average] : average [Cooperative] : cooperative [Full] : full [Clear] : clear [Linear/Goal Directed] : linear/goal directed [WNL] : within normal limits [FreeTextEntry8] : stable

## 2023-01-26 NOTE — REASON FOR VISIT
[Patient preference] : as per patient preference [FreeTextEntry4] : 11am [FreeTextEntry5] : [id number] [FreeTextEntry2] : 1/23/23 [Patient] : Patient [Telehealth (audio & video) - Individual/Group] : This visit was provided via telehealth using real-time 2-way audio visual technology. [Other Location: e.g. Home (Enter Location, City,State)___] : The provider was located at [unfilled]. [Home] : The patient, [unfilled], was located at home, [unfilled], at the time of the visit. [Verbal consent obtained from patient/other participant(s)] : Verbal consent for telehealth/telephonic services obtained from patient/other participant(s) [FreeTextEntry1] : follow up treatment of DIMITRI and Mood disorder, possibly Bipolar 2 versus MDD

## 2023-01-26 NOTE — HISTORY OF PRESENT ILLNESS
[Not Applicable] : Not applicable [FreeTextEntry1] : Patient reporting that he feels overall improved with recent increase in Bupropion and with support of new therapist.  Spoke of sleeping 5-6 hrs most nights and at times taking 30-60 minute early evening nap.  SPoke of feeling revved up and having nervous energy at times though no overt alicia and no deep despondency.   [FreeTextEntry2] : Dx with bipolar disorder in 2016, however no clear manic episode reported in lifetime. No IPP admissions in lifetime, saw 3 prior psychiatrists, last time was in 2017 - PMD continued prescribing psychiatric medications since then.  [FreeTextEntry3] : was first started on medications in [id number]: wellbutrin ER 300mg, lamotrigine 25 mg daily (started at 50mg, then brought down to 25mg), zolpidem 10mg PRN (1/2 of 1 PRN, says 30 days lasts 3 months. Denies any other medication trials.

## 2023-01-26 NOTE — SOCIAL HISTORY
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 , living with . [place], moved to [place], then [place], then [place], then [place], then [place] in 2009. \par Denies any abuse history.\par

## 2023-01-26 NOTE — RISK ASSESSMENT
[FreeTextEntry8] : pt denied thoughts of suicide [FreeTextEntry7] : no evidence of current violent or homicidal ideation [No, patient denies ideation or behavior] : No, patient denies ideation or behavior

## 2023-01-30 ENCOUNTER — APPOINTMENT (OUTPATIENT)
Dept: PSYCHIATRY | Facility: CLINIC | Age: 58
End: 2023-01-30
Payer: MEDICAID

## 2023-01-30 PROCEDURE — 90834 PSYTX W PT 45 MINUTES: CPT

## 2023-01-30 NOTE — PLAN
[Psychodynamic Therapy] : Psychodynamic Therapy  [Psychoeducation] : Psychoeducation  [Return in ____ week(s)] : Return in [unfilled] week(s) [FreeTextEntry2] : treatment will focus on supporting pt achieving his goals getting work, improving his mood, socializing more, decreasing perfectionism, and increasing motivation. [de-identified] : Pt arrived on time for session. Session focused on discussing pt's difficulty sleeping and discussing the treatment approach determined with Dr. Wells. Also explored pt's self-esteem as it relates to breakouts on his face resulting from decreased sleep. Discussed pt's romantic and sexual history and the experience of being [employer] in college in [place] in the 80s. Informed pt of need to complete questionnaires for treatment plan on Thursday. Pt was cooperative and open to therapeutic inquiry. Session concluded with pt in good emotional control.  [FreeTextEntry1] : continue twice weekly psychotherapy and medication management. discuss possibility of group therapy.

## 2023-01-30 NOTE — PHYSICAL EXAM
[Well groomed] : well groomed [Average] : average [Cooperative] : cooperative [Anxious] : anxious [Full] : full [Rapid] : rapid [Linear/Goal Directed] : linear/goal directed [WNL] : within normal limits [FreeTextEntry8] : frustrated [de-identified] : tearful at times

## 2023-02-02 ENCOUNTER — APPOINTMENT (OUTPATIENT)
Dept: PSYCHIATRY | Facility: CLINIC | Age: 58
End: 2023-02-02
Payer: MEDICAID

## 2023-02-02 ENCOUNTER — NON-APPOINTMENT (OUTPATIENT)
Age: 58
End: 2023-02-02

## 2023-02-02 PROCEDURE — 90834 PSYTX W PT 45 MINUTES: CPT | Mod: 95

## 2023-02-02 NOTE — PLAN
[FreeTextEntry2] : treatment will focus on supporting pt achieving his goals getting work, improving his mood, socializing more, decreasing perfectionism, and increasing motivation. [Psychodynamic Therapy] : Psychodynamic Therapy  [Psychoeducation] : Psychoeducation  [de-identified] : Pt arrived on time for session. Session began with the completion of relevant measures for treatment plan update, which pt engaged with well and all screeners were completed. Remaining session time focused on exploring pt's obsessional and controlling dynamics with his partner and the extent to which emotions are communicated verbally vs implicitly. Agreed to discuss further in future sessions.  Pt was cooperative and open to therapeutic inquiry. Session concluded with pt in good emotional control.  [Return in ____ week(s)] : Return in [unfilled] week(s) [FreeTextEntry1] : continue twice weekly psychotherapy and medication management. discuss possibility of group therapy.

## 2023-02-02 NOTE — REASON FOR VISIT
[Patient preference] : as per patient preference [Other Location: e.g. Home (Enter Location, City,State)___] : The provider was located at [unfilled]. [Home] : The patient, [unfilled], was located at home, [unfilled], at the time of the visit. [Verbal consent obtained from patient/other participant(s)] : Verbal consent for telehealth/telephonic services obtained from patient/other participant(s) [FreeTextEntry4] : 11am [FreeTextEntry5] : [id number] [FreeTextEntry2] : 1/23/23 [Patient] : Patient [FreeTextEntry1] : Pt requests psychotherapy to focus on anhedonia, low motivation, feelings of "stuckness", processing his father's recent death, feelings of humiliation and guilt, perfectionism

## 2023-02-02 NOTE — PHYSICAL EXAM
[Well groomed] : well groomed [Average] : average [Cooperative] : cooperative [Anxious] : anxious [Full] : full [Rapid] : rapid [Linear/Goal Directed] : linear/goal directed [WNL] : within normal limits [FreeTextEntry8] : frustrated [de-identified] : tearful at times

## 2023-02-06 ENCOUNTER — APPOINTMENT (OUTPATIENT)
Dept: PSYCHIATRY | Facility: CLINIC | Age: 58
End: 2023-02-06
Payer: MEDICAID

## 2023-02-06 ENCOUNTER — NON-APPOINTMENT (OUTPATIENT)
Age: 58
End: 2023-02-06

## 2023-02-06 PROCEDURE — 90834 PSYTX W PT 45 MINUTES: CPT

## 2023-02-06 NOTE — PLAN
[Psychodynamic Therapy] : Psychodynamic Therapy  [Psychoeducation] : Psychoeducation  [Return in ____ week(s)] : Return in [unfilled] week(s) [FreeTextEntry2] : treatment will focus on supporting pt achieving his goals getting work, improving his mood, socializing more, decreasing perfectionism, and increasing motivation. [de-identified] : Pt arrived on time for session. Session focused on discussing pt's relationship with his partner and their romantic life. Also discussed pt's relationship with his best friend. Toward end of session, pt recalled that the following day was the anniversary of his mother's suicide. Agreed to continue discussing on Thursday. Pt was cooperative and open to therapeutic inquiry. Session concluded with pt in good emotional control.  [FreeTextEntry1] : continue twice weekly psychotherapy and medication management. discuss possibility of group therapy.

## 2023-02-06 NOTE — DISCUSSION/SUMMARY
[FreeTextEntry1] : Writer left pt voicemail requesting that he come for in person  visit on 2/24 at 2 or 230 pm due to need to prescribe controlled meds in person at least yearly (or technically every 2 years.)  \par Pt on Ambien which is triggering this invite.  Will also email this to pt.  \par Awaiting contact back from pt.  re: [email or web address].

## 2023-02-06 NOTE — PHYSICAL EXAM
[Well groomed] : well groomed [Average] : average [Cooperative] : cooperative [Anxious] : anxious [Full] : full [Rapid] : rapid [Linear/Goal Directed] : linear/goal directed [WNL] : within normal limits [FreeTextEntry8] : frustrated [de-identified] : tearful at times

## 2023-02-07 ENCOUNTER — FORM ENCOUNTER (OUTPATIENT)
Age: 58
End: 2023-02-07

## 2023-02-09 ENCOUNTER — APPOINTMENT (OUTPATIENT)
Dept: PSYCHIATRY | Facility: CLINIC | Age: 58
End: 2023-02-09
Payer: MEDICAID

## 2023-02-09 PROCEDURE — 90834 PSYTX W PT 45 MINUTES: CPT | Mod: 95

## 2023-02-09 NOTE — PHYSICAL EXAM
[Well groomed] : well groomed [Average] : average [Cooperative] : cooperative [Anxious] : anxious [Full] : full [Rapid] : rapid [Linear/Goal Directed] : linear/goal directed [WNL] : within normal limits [FreeTextEntry8] : frustrated [de-identified] : tearful at times

## 2023-02-09 NOTE — REASON FOR VISIT
[Patient preference] : as per patient preference [Other Location: e.g. Home (Enter Location, City,State)___] : The provider was located at [unfilled]. [Home] : The patient, [unfilled], was located at home, [unfilled], at the time of the visit. [Verbal consent obtained from patient/other participant(s)] : Verbal consent for telehealth/telephonic services obtained from patient/other participant(s) [FreeTextEntry4] : 11am [FreeTextEntry5] : [id number] [FreeTextEntry2] : 1/23/23 [Patient] : Patient [FreeTextEntry1] : Pt requests psychotherapy to focus on anhedonia, low motivation, feelings of "stuckness", processing his father's recent death, feelings of humiliation and guilt, perfectionism

## 2023-02-09 NOTE — PLAN
[FreeTextEntry2] : treatment will focus on supporting pt achieving his goals getting work, improving his mood, socializing more, decreasing perfectionism, and increasing motivation. [Psychodynamic Therapy] : Psychodynamic Therapy  [Psychoeducation] : Psychoeducation  [de-identified] : Pt arrived on time for session. Session focused on processing pt's relationship with his mother, whose death anniversary was two days previous. Dyad also discussed patterns of pt's perfections and tied this to his experience of being an only child and receiving a great deal of feedback. Explored how this allows him to break large tasks into chunks but how he struggles to do so when the task is more vulnerable and/or the stakes higher. Discussed how he is empowered by anger and indignation, but struggles to apply this to himself due to shame.  Pt was cooperative and open to therapeutic inquiry. Session concluded with pt in good emotional control.  [Return in ____ week(s)] : Return in [unfilled] week(s) [FreeTextEntry1] : continue twice weekly psychotherapy and medication management. discuss possibility of group therapy.

## 2023-02-13 ENCOUNTER — APPOINTMENT (OUTPATIENT)
Dept: PSYCHIATRY | Facility: CLINIC | Age: 58
End: 2023-02-13
Payer: MEDICAID

## 2023-02-13 PROCEDURE — 90834 PSYTX W PT 45 MINUTES: CPT

## 2023-02-13 NOTE — RISK ASSESSMENT
[No] : No [Low acute suicide risk] : Low acute suicide risk [FreeTextEntry9] : low risk - denies current SI/HI.

## 2023-02-13 NOTE — DISCUSSION/SUMMARY
[Denied] : Denied [No] : No [Yes: Details:___] : Yes: [unfilled] [Patient is not prescribed antipsychotic medication] : Patient is not prescribed antipsychotic medication [Patient has been tested for Hepatitis?] : Patient has been tested for hepatitis [Unknown] : Unknown [Current or past COVID-19 diagnosis?] : Patient had a present or past COVID-19 diagnosis [Vaccinated?] : is vaccinated [Date of last vaccination (mm/dd/yy):___] : date of last vaccination: [unfilled] [Education provided about COVID-19?] : Education provided about COVID-19 [Initial Plan] : Initial Plan [Able to manage surrounding demands and opportunities] : able to manage surrounding demands and opportunities [Able to exercise self-direction] : able to exercise self-direction [Able to set and pursue goals] : able to set and pursue goals [Adherent to treatment recommendations] : adherent to treatment recommendations [Articulate] : articulate [Attempting to realize their potential] : Attempting to realize their potential [Cognitively intact] : cognitively intact [Good impulse control] : good impulse control [Insightful] : insightful [Intelligent] : intelligent [Motivated to participate in treatment] : motivated to participate in treatment [Health literacy] : health literacy [Motivated to maintain or improve physical health] : motivated to maintain or improve physical health [In good health] : in good health [Financially stable] : financially stable [Has vocational interests or hobbies] : has vocational interests or hobbies [Part of a supportive marriage] : part of a supportive marriage [Part of a supportive family] : part of a supportive family [Housing stability] : housing stability [Civic organizations] : civic organizations [High level of education] : high level of education [English fluency] : English fluency [Connected to healthcare] : connected to healthcare [Access to safe outdoor spaces] : access to safe outdoor spaces [Physical Health] : Physical Health [Occupational/Educational] : Occupational/Educational [Interpersonal Relationships] : Interpersonal Relationships [Continued - Progress made] : Continued - Progress made: [every ___ months] : every [unfilled] months [A change in level of care is needed as evidenced by:] : A change in level of care is needed as evidenced by: [None - Reason others did not participate:] : None - Reason others did not participate:  [Yes] : Yes [Psychiatric Provider/Prescriber] : Psychiatric Provider/Prescriber [Therapist] : Therapist [Mental Health] : Mental Health [Initial] : Initial [every ___ weeks] : every [unfilled] weeks [___ times a week] : [unfilled] times a week [FreeTextEntry2] : 8/1/23 [FreeTextEntry3] : 10/25/22 [de-identified] : Establish an inward sense of self-worth, confidence, and competence [de-identified] : 8/1/23 [FreeTextEntry1] : Perfectionism [FreeTextEntry4] : need to get better at accepting messiness and imperfection [de-identified] : achieve an overall decrease in pressured, driven, behaviors [de-identified] : 8/1/23 [FreeTextEntry5] : insight oriented therapy.  [de-identified] : complete symptomatic remission with no adjustment in medications over 1 year [Completed, copy requested] : Completed, copy requested [Does patient use tobacco products?] : Patient does not use tobacco products [Does patient use medical marijuana?] : Patient does not use medical marijuana. [Patient has been tested for HIV?] : Patient has not been tested for HIV [Patient would like to be tested/re-tested?] : patient would not like to be tested/re-tested [Negative] : Negative [FreeTextEntry9] : 6/22/22 [de-identified] : requested by writer along with PE [de-identified] : 4-5 years ago

## 2023-02-13 NOTE — PHYSICAL EXAM
[4] : 4 [3] : 3 [1] : 1 [5] : 5 [2] : 2 [1 - Monthly or less] : 1 - Monthly or less [0 - 1 or 2] : 0 - 1 or 2 [1 - Less than monthly] : 1 - Less than monthly [0 - Never] : 0 - Never [0 - No] : 0 - No [] : No [FreeTextEntry1] : 2 [SchwartzScore] : 33 mild impairment

## 2023-02-13 NOTE — PLAN
[Psychodynamic Therapy] : Psychodynamic Therapy  [Psychoeducation] : Psychoeducation  [Return in ____ week(s)] : Return in [unfilled] week(s) [FreeTextEntry2] : treatment will focus on supporting pt achieving his goals getting work, improving his mood, socializing more, decreasing perfectionism, and increasing motivation. [de-identified] : Pt arrived on time for session. Session focused on exploring pt's experience of early life, coming out as [employer] and his mother's initial ambivalence, and various experiences in college such as studying abroad. Pt was cooperative and open to therapeutic inquiry. Session concluded with pt in good emotional control.  [FreeTextEntry1] : continue twice weekly psychotherapy and medication management. discuss possibility of group therapy.

## 2023-02-13 NOTE — DISCUSSION/SUMMARY
[Denied] : Denied [No] : No [Yes: Details:___] : Yes: [unfilled] [Patient is not prescribed antipsychotic medication] : Patient is not prescribed antipsychotic medication [Patient has been tested for Hepatitis?] : Patient has been tested for hepatitis [Unknown] : Unknown [Current or past COVID-19 diagnosis?] : Patient had a present or past COVID-19 diagnosis [Vaccinated?] : is vaccinated [Date of last vaccination (mm/dd/yy):___] : date of last vaccination: [unfilled] [Education provided about COVID-19?] : Education provided about COVID-19 [Initial Plan] : Initial Plan [Able to manage surrounding demands and opportunities] : able to manage surrounding demands and opportunities [Able to exercise self-direction] : able to exercise self-direction [Able to set and pursue goals] : able to set and pursue goals [Adherent to treatment recommendations] : adherent to treatment recommendations [Articulate] : articulate [Attempting to realize their potential] : Attempting to realize their potential [Cognitively intact] : cognitively intact [Good impulse control] : good impulse control [Insightful] : insightful [Intelligent] : intelligent [Motivated to participate in treatment] : motivated to participate in treatment [Health literacy] : health literacy [Motivated to maintain or improve physical health] : motivated to maintain or improve physical health [In good health] : in good health [Financially stable] : financially stable [Has vocational interests or hobbies] : has vocational interests or hobbies [Part of a supportive marriage] : part of a supportive marriage [Part of a supportive family] : part of a supportive family [Housing stability] : housing stability [Civic organizations] : civic organizations [High level of education] : high level of education [English fluency] : English fluency [Connected to healthcare] : connected to healthcare [Access to safe outdoor spaces] : access to safe outdoor spaces [Physical Health] : Physical Health [Occupational/Educational] : Occupational/Educational [Interpersonal Relationships] : Interpersonal Relationships [Continued - Progress made] : Continued - Progress made: [every ___ months] : every [unfilled] months [A change in level of care is needed as evidenced by:] : A change in level of care is needed as evidenced by: [None - Reason others did not participate:] : None - Reason others did not participate:  [Yes] : Yes [Psychiatric Provider/Prescriber] : Psychiatric Provider/Prescriber [Therapist] : Therapist [Mental Health] : Mental Health [Initial] : Initial [every ___ weeks] : every [unfilled] weeks [___ times a week] : [unfilled] times a week [FreeTextEntry2] : 8/1/23 [FreeTextEntry3] : 10/25/22 [de-identified] : Establish an inward sense of self-worth, confidence, and competence [de-identified] : 8/1/23 [FreeTextEntry1] : Perfectionism [FreeTextEntry4] : need to get better at accepting messiness and imperfection [de-identified] : achieve an overall decrease in pressured, driven, behaviors [de-identified] : 8/1/23 [FreeTextEntry5] : insight oriented therapy.  [de-identified] : complete symptomatic remission with no adjustment in medications over 1 year [Completed, copy requested] : Completed, copy requested [Does patient use tobacco products?] : Patient does not use tobacco products [Does patient use medical marijuana?] : Patient does not use medical marijuana. [Patient has been tested for HIV?] : Patient has not been tested for HIV [Patient would like to be tested/re-tested?] : patient would not like to be tested/re-tested [Negative] : Negative [FreeTextEntry9] : 6/22/22 [de-identified] : requested by writer along with PE [de-identified] : 4-5 years ago

## 2023-02-13 NOTE — PHYSICAL EXAM
[Well groomed] : well groomed [Average] : average [Cooperative] : cooperative [Anxious] : anxious [Full] : full [Rapid] : rapid [Linear/Goal Directed] : linear/goal directed [WNL] : within normal limits [FreeTextEntry8] : frustrated [de-identified] : tearful at times

## 2023-02-16 ENCOUNTER — APPOINTMENT (OUTPATIENT)
Dept: PSYCHIATRY | Facility: CLINIC | Age: 58
End: 2023-02-16
Payer: MEDICAID

## 2023-02-16 PROCEDURE — 90834 PSYTX W PT 45 MINUTES: CPT | Mod: 95

## 2023-02-16 NOTE — REASON FOR VISIT
[Patient preference] : as per patient preference [Other Location: e.g. Home (Enter Location, City,State)___] : The provider was located at [unfilled]. [Home] : The patient, [unfilled], was located at home, [unfilled], at the time of the visit. [Verbal consent obtained from patient/other participant(s)] : Verbal consent for telehealth/telephonic services obtained from patient/other participant(s) [FreeTextEntry4] : 11am [FreeTextEntry5] : [id number] [FreeTextEntry2] : 1/23/23 [Patient] : Patient [FreeTextEntry1] : Pt requests psychotherapy to focus on anhedonia, low motivation, feelings of "stuckness", processing his father's recent death, feelings of humiliation and guilt, perfectionism

## 2023-02-16 NOTE — PHYSICAL EXAM
[Well groomed] : well groomed [Average] : average [Cooperative] : cooperative [Anxious] : anxious [Full] : full [Rapid] : rapid [Linear/Goal Directed] : linear/goal directed [WNL] : within normal limits [FreeTextEntry8] : frustrated [de-identified] : tearful at times

## 2023-02-16 NOTE — PLAN
[FreeTextEntry2] : treatment will focus on supporting pt achieving his goals getting work, improving his mood, socializing more, decreasing perfectionism, and increasing motivation. [Psychodynamic Therapy] : Psychodynamic Therapy  [Psychoeducation] : Psychoeducation  [de-identified] : Pt arrived on time for session. Session focused on relational process around pt's social impact and coming off as more "intense" than he wants to. Explored how this relates to relational insecurity and wanting to be perceived in certain ways, exploring how he unintentionally may be coming off as more distanced and inauthentic than he hoped to. Explored how this was evident in his occupational history and social history.  Pt was cooperative and open to therapeutic inquiry. Session concluded with pt in good emotional control.  [Return in ____ week(s)] : Return in [unfilled] week(s) [FreeTextEntry1] : continue twice weekly psychotherapy and medication management. discuss possibility of group therapy.

## 2023-02-23 ENCOUNTER — APPOINTMENT (OUTPATIENT)
Dept: PSYCHIATRY | Facility: CLINIC | Age: 58
End: 2023-02-23

## 2023-02-24 ENCOUNTER — APPOINTMENT (OUTPATIENT)
Dept: PSYCHIATRY | Facility: CLINIC | Age: 58
End: 2023-02-24
Payer: MEDICAID

## 2023-02-24 PROCEDURE — 99215 OFFICE O/P EST HI 40 MIN: CPT

## 2023-02-24 NOTE — DISCUSSION/SUMMARY
[FreeTextEntry1] : Patient is a 57 year old white male, currently unemployed, former executive in cosmetics industry, domiciled with , with reported psychiatric history of bipolar disorder diagnosed in 2016, with no IPP admissions, initially presented to clinic to re-establish psychiatric care after having last seen psychiatrist in 2017 for medication management as well as therapy, presenting today for follow-up.\par On initial evaluation, patient had presented with symptoms suggestive of Generalized Anxiety Disorder, but did not appear acutely depressed, suicidal, manic, or psychotic. While patient reported history of Bipolar 1 Disorder, he did not report any lifetime manic episodes and patient's medication regimen (Lamictal 25 mg daily) would not likely have been effective in treatment of Bipolar 1 Disorder. \par  \par Differential Dx: Major Depressive Disorder versus Cyclothymia versus Bipolar Spectrum Disorder\par \par Patient reports some dysphoria and anxiety as well as some fluctuating mood with periods of increased attention to projects and details; pt has also reported reduced sleep of 5-6 hrs, with history of naps,  30-60 minutes some days as well.\par \par Date of last physical exam: 6/22/2022\par Date of last (annual) labs: 6/22/2022  (see labs section for details) \par Annual review of systems completed (Y/N): Yes\par Tobacco Screening Completed (Y/N): Yes, no smoker\par \par \par \par

## 2023-02-24 NOTE — PHYSICAL EXAM
[Average] : average [Cooperative] : cooperative [Full] : full [Clear] : clear [Linear/Goal Directed] : linear/goal directed [WNL] : within normal limits [No] : No [Euthymic] : euthymic [Anxious] : anxious

## 2023-02-24 NOTE — SOCIAL HISTORY
[FreeTextEntry1] : Pt has bachelor's degree in history, worked in beauty/cosmetic industry for 30 years, worked at [employer] but currently unemployed. Works as election poll worker seasonally. , living with . Born [place] raised [place], moved to [place], then [place], then [place], then [place], then [place] in 2009. \par Denies any abuse history.\par

## 2023-02-24 NOTE — PLAN
[Medication education provided] : Medication education provided. [Rationale for medication choices, possible risks/precautions, benefits, alternative treatment choices, and consequences of non-treatment discussed] : Rationale for medication choices, possible risks/precautions, benefits, alternative treatment choices, and consequences of non-treatment discussed with patient/family/caregiver  [Yes. details: ___] : Yes, [unfilled] [FreeTextEntry5] : Offered support/encouragement, psychoeducation, counseling regarding: diagnosis/diagnoses, medications, symptoms, recommendations etc.\par \par Reviewed/discussed plan below:\par \par -Continue Bupropion XL  at 450 mg po daily; \par -May take Ambien 5 mg prn but only minimally at no more than 2 times a week maximum\par -Writer has reviewed potential to try: Lamictal or Trileptal (if Bipolar spectrum being entertained) versus hydroxyzine or gabapentin (if only wanting to target sleep and anxiety for now)\par -Pt currently taking hydroxyzine 25 mg prn or Ambien 5 mg prn\par -Pt to continue journaling regarding moods, energy, sleep etc and may review\par -Continue weekly therapy\par -May collaborate with pt's therapist prn\par -Contact writer prn\par \par 40 minutes spent reviewing prior records/notes, seeing patient and recording session note\par

## 2023-02-24 NOTE — HISTORY OF PRESENT ILLNESS
[Not Applicable] : Not applicable [FreeTextEntry1] : Nilson reporting that he is overall feeling more energy with increased Wellbutrin and taking Hydroxyzine 1-2 times a week and Ambien on occasion as well.  Spoke of journaling and thinks overall thinks might be a little better but is continuing in therapy to explore.  Spoke about frustrations with [employer] and their frequent changes in medication filling requirements.  [FreeTextEntry2] : Dx with bipolar disorder in 2016, however no clear manic episode reported in lifetime. No IPP admissions in lifetime, saw 3 prior psychiatrists, last time was in 2017 - PMD continued prescribing psychiatric medications since then.  [FreeTextEntry3] : was first started on medications in [id number]: wellbutrin ER 300mg, lamotrigine 25 mg daily (started at 50mg, then brought down to 25mg), zolpidem 10mg PRN (1/2 of 1 PRN, says 30 days lasts 3 months. Denies any other medication trials.

## 2023-02-24 NOTE — REVIEW OF SYSTEMS
[Negative] : Allergic/Immunologic [FreeTextEntry9] : muscle tension [de-identified] : some anxiety and mood fluctuations

## 2023-02-27 ENCOUNTER — APPOINTMENT (OUTPATIENT)
Dept: PSYCHIATRY | Facility: CLINIC | Age: 58
End: 2023-02-27
Payer: MEDICAID

## 2023-02-27 PROCEDURE — 90834 PSYTX W PT 45 MINUTES: CPT

## 2023-02-27 NOTE — PLAN
[FreeTextEntry2] : treatment will focus on supporting pt achieving his goals getting work, improving his mood, socializing more, decreasing perfectionism, and increasing motivation. [Psychodynamic Therapy] : Psychodynamic Therapy  [Psychoeducation] : Psychoeducation  [de-identified] : Pt arrived on time for session. Session began with discussion of pt's potential interest in [facility]. Pt had many questions and ultimately stated he was interested in joining. remainder of session focused on pt's coping strategies for anxiety and how he tolerates uncertainty via a need for control. Discussed how pt excels during a crisis and is able to make rational decisions. Pt was cooperative and open to therapeutic inquiry. Session concluded with pt in good emotional control.  [Return in ____ week(s)] : Return in [unfilled] week(s) [FreeTextEntry1] : continue twice weekly psychotherapy and medication management. begin group therapy in [facility].

## 2023-02-27 NOTE — PHYSICAL EXAM
[Well groomed] : well groomed [Average] : average [Cooperative] : cooperative [Anxious] : anxious [Full] : full [Rapid] : rapid [Linear/Goal Directed] : linear/goal directed [WNL] : within normal limits [FreeTextEntry8] : frustrated [de-identified] : tearful at times

## 2023-02-27 NOTE — RISK ASSESSMENT
[FreeTextEntry7] : no evidence of current violent or homicidal ideation [FreeTextEntry8] : pt denied thoughts of suicide

## 2023-03-02 ENCOUNTER — APPOINTMENT (OUTPATIENT)
Dept: PSYCHIATRY | Facility: CLINIC | Age: 58
End: 2023-03-02
Payer: MEDICAID

## 2023-03-02 PROCEDURE — 90834 PSYTX W PT 45 MINUTES: CPT | Mod: 95

## 2023-03-02 NOTE — REASON FOR VISIT
[Patient preference] : as per patient preference [Other Location: e.g. Home (Enter Location, City,State)___] : The provider was located at [unfilled]. [Home] : The patient, [unfilled], was located at home, [unfilled], at the time of the visit. [Verbal consent obtained from patient/other participant(s)] : Verbal consent for telehealth/telephonic services obtained from patient/other participant(s) [FreeTextEntry4] : 11am [FreeTextEntry5] : [id number] [FreeTextEntry2] : 1/23/23 [Patient] : Patient [FreeTextEntry1] : Pt requests psychotherapy to focus on anhedonia, low motivation, feelings of "stuckness", processing his father's recent death, feelings of humiliation and guilt, perfectionism

## 2023-03-02 NOTE — PHYSICAL EXAM
[Well groomed] : well groomed [Average] : average [Cooperative] : cooperative [Anxious] : anxious [Full] : full [Rapid] : rapid [Linear/Goal Directed] : linear/goal directed [WNL] : within normal limits [FreeTextEntry8] : frustrated [de-identified] : tearful at times

## 2023-03-02 NOTE — PLAN
[FreeTextEntry2] : treatment will focus on supporting pt achieving his goals getting work, improving his mood, socializing more, decreasing perfectionism, and increasing motivation. [Psychodynamic Therapy] : Psychodynamic Therapy  [Psychoeducation] : Psychoeducation  [de-identified] : Pt arrived on time for session. 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 Explored pt's emotional reactions and use of crisis management as a coping style. Explored how this was related to his anxiety about starting group therapy, which pt is excited to begin. Pt was cooperative and open to therapeutic inquiry. Session concluded with pt in good emotional control.  [Return in ____ week(s)] : Return in [unfilled] week(s) [FreeTextEntry1] : continue twice weekly psychotherapy and medication management. begin [facility] group.

## 2023-03-06 ENCOUNTER — APPOINTMENT (OUTPATIENT)
Dept: PSYCHIATRY | Facility: CLINIC | Age: 58
End: 2023-03-06
Payer: MEDICAID

## 2023-03-06 PROCEDURE — 90834 PSYTX W PT 45 MINUTES: CPT

## 2023-03-06 NOTE — PHYSICAL EXAM
[Well groomed] : well groomed [Average] : average [Cooperative] : cooperative [Anxious] : anxious [Full] : full [Rapid] : rapid [Linear/Goal Directed] : linear/goal directed [WNL] : within normal limits [FreeTextEntry8] : frustrated [de-identified] : tearful at times

## 2023-03-06 NOTE — PLAN
[Psychodynamic Therapy] : Psychodynamic Therapy  [Psychoeducation] : Psychoeducation  [Return in ____ week(s)] : Return in [unfilled] week(s) [FreeTextEntry2] : treatment will focus on supporting pt achieving his goals getting work, improving his mood, socializing more, decreasing perfectionism, and increasing motivation. [de-identified] : Pt arrived on time for session. Session focused on exploring pt's childhood as an only child and how this impacted his relationship with his parents and sense of self. Explored his experience of lack of control as a form of anxiety and how there are often unconscious relational and self-esteem vulnerabilities that underlie it. Pt was cooperative and open to therapeutic inquiry. Session concluded with pt in good emotional control.  [FreeTextEntry1] : continue twice weekly psychotherapy and medication management. begin group therapy in [facility].

## 2023-03-09 ENCOUNTER — APPOINTMENT (OUTPATIENT)
Dept: PSYCHIATRY | Facility: CLINIC | Age: 58
End: 2023-03-09
Payer: MEDICAID

## 2023-03-09 PROCEDURE — 90834 PSYTX W PT 45 MINUTES: CPT | Mod: 95

## 2023-03-09 NOTE — PLAN
[FreeTextEntry2] : treatment will focus on supporting pt achieving his goals getting work, improving his mood, socializing more, decreasing perfectionism, and increasing motivation. [Psychodynamic Therapy] : Psychodynamic Therapy  [Psychoeducation] : Psychoeducation  [de-identified] : Pt arrived on time for session. Session focused on exploring pt's history with his partner and their prominent interpersonal dynamics. Throughout session, writer focused on the interpersonal relationship between writer and pt, particularly focusing on the difference between pt's interpersonal intent and the impact of the actions on the other. Pt was cooperative and open to therapeutic inquiry. Session concluded with pt in good emotional control.  [Return in ____ week(s)] : Return in [unfilled] week(s) [FreeTextEntry1] : continue twice weekly psychotherapy and medication management. begin [facility] group.

## 2023-03-09 NOTE — PHYSICAL EXAM
[Well groomed] : well groomed [Average] : average [Cooperative] : cooperative [Anxious] : anxious [Full] : full [Rapid] : rapid [Linear/Goal Directed] : linear/goal directed [WNL] : within normal limits [FreeTextEntry8] : frustrated [de-identified] : tearful at times

## 2023-03-09 NOTE — REASON FOR VISIT
[Patient preference] : as per patient preference [Other Location: e.g. Home (Enter Location, City,State)___] : The provider was located at [unfilled]. [Home] : The patient, [unfilled], was located at home, [unfilled], at the time of the visit. [Verbal consent obtained from patient/other participant(s)] : Verbal consent for telehealth/telephonic services obtained from patient/other participant(s) [FreeTextEntry4] : 11am [FreeTextEntry5] : [id number] [FreeTextEntry2] : 1/23/23 [Patient] : Patient [FreeTextEntry1] : Pt requests psychotherapy to focus on anhedonia, low motivation, feelings of "stuckness", processing his father's recent death, feelings of humiliation and guilt, perfectionism

## 2023-03-13 ENCOUNTER — APPOINTMENT (OUTPATIENT)
Dept: PSYCHIATRY | Facility: CLINIC | Age: 58
End: 2023-03-13
Payer: MEDICAID

## 2023-03-13 PROCEDURE — 90834 PSYTX W PT 45 MINUTES: CPT

## 2023-03-13 NOTE — PHYSICAL EXAM
[Well groomed] : well groomed [Average] : average [Cooperative] : cooperative [Anxious] : anxious [Full] : full [Rapid] : rapid [Linear/Goal Directed] : linear/goal directed [WNL] : within normal limits [FreeTextEntry8] : frustrated [de-identified] : tearful at times

## 2023-03-13 NOTE — REASON FOR VISIT
[Patient preference] : as per patient preference [Other Location: e.g. Home (Enter Location, City,State)___] : The provider was located at [unfilled]. [Home] : The patient, [unfilled], was located at home, [unfilled], at the time of the visit. [Verbal consent obtained from patient/other participant(s)] : Verbal consent for telehealth/telephonic services obtained from patient/other participant(s) [FreeTextEntry4] : 11am [FreeTextEntry5] : [id number] [FreeTextEntry2] : 1/23/23 [Patient] : Patient [FreeTextEntry1] : Pt requests psychotherapy to focus on anhedonia, low motivation, feelings of "stuckness", processing his father's recent death, feelings of humiliation and guilt, perfectionism

## 2023-03-13 NOTE — PLAN
[FreeTextEntry2] : treatment will focus on supporting pt achieving his goals getting work, improving his mood, socializing more, decreasing perfectionism, and increasing motivation. [Psychodynamic Therapy] : Psychodynamic Therapy  [Psychoeducation] : Psychoeducation  [de-identified] : Pt arrived on time for session. Session focused on processing dyadic feelings following previous therapy session as a way of working through interpersonal dynamics and expectations. Dyad able to work toward rupture repair. Pt was cooperative and open to therapeutic inquiry. Session concluded with pt in good emotional control.  [Return in ____ week(s)] : Return in [unfilled] week(s) [FreeTextEntry1] : continue twice weekly psychotherapy and medication management. begin modern masculinities group.

## 2023-03-16 ENCOUNTER — APPOINTMENT (OUTPATIENT)
Dept: PSYCHIATRY | Facility: CLINIC | Age: 58
End: 2023-03-16
Payer: MEDICAID

## 2023-03-16 PROCEDURE — 90834 PSYTX W PT 45 MINUTES: CPT | Mod: 95

## 2023-03-16 NOTE — REASON FOR VISIT
[Patient preference] : as per patient preference [Other Location: e.g. Home (Enter Location, City,State)___] : The provider was located at [unfilled]. [Home] : The patient, [unfilled], was located at home, [unfilled], at the time of the visit. [Verbal consent obtained from patient/other participant(s)] : Verbal consent for telehealth/telephonic services obtained from patient/other participant(s) [Patient] : Patient [FreeTextEntry4] : 11am [FreeTextEntry5] : 2629ex [FreeTextEntry2] : 1/23/23 [FreeTextEntry1] : Pt requests psychotherapy to focus on anhedonia, low motivation, feelings of "stuckness", processing his father's recent death, feelings of humiliation and guilt, perfectionism

## 2023-03-16 NOTE — PHYSICAL EXAM
[Well groomed] : well groomed [Average] : average [Cooperative] : cooperative [Anxious] : anxious [Full] : full [Rapid] : rapid [Linear/Goal Directed] : linear/goal directed [WNL] : within normal limits [FreeTextEntry8] : frustrated [de-identified] : tearful at times

## 2023-03-16 NOTE — PLAN
[Psychodynamic Therapy] : Psychodynamic Therapy  [Psychoeducation] : Psychoeducation  [Return in ____ week(s)] : Return in [unfilled] week(s) [FreeTextEntry2] : treatment will focus on supporting pt achieving his goals getting work, improving his mood, socializing more, decreasing perfectionism, and increasing motivation. [de-identified] : Pt arrived on time for session. Session focused on continuing to process previous few sessions and the therapeutic relationship. Remainder of session focused on providing behavioral activation strategies for depression and discussing conflict around vulnerability, risk, and reward of pt stopping procrastinating on making significant changes in his life. Pt was cooperative and open to therapeutic inquiry. Session concluded with pt in good emotional control.  [FreeTextEntry1] : continue twice weekly psychotherapy and medication management. begin modern masculinities group.

## 2023-03-20 ENCOUNTER — APPOINTMENT (OUTPATIENT)
Dept: PSYCHIATRY | Facility: CLINIC | Age: 58
End: 2023-03-20
Payer: MEDICAID

## 2023-03-20 PROCEDURE — 90834 PSYTX W PT 45 MINUTES: CPT

## 2023-03-20 NOTE — PLAN
[FreeTextEntry2] : treatment will focus on supporting pt achieving his goals getting work, improving his mood, socializing more, decreasing perfectionism, and increasing motivation. [Psychodynamic Therapy] : Psychodynamic Therapy  [Psychoeducation] : Psychoeducation  [de-identified] : Pt arrived on time for session. Session focused on exporing pt's current feelings of sadness, loneliness, and lack of motivation. Explored the ways in which he is contributing to his own low mood through self-punitive behavior and thoughts, and the ways in which his fear of vulnerability prevents him from utilizing relationships for support and motivation, as well as the fear that professional vulnerability keep him from reaching out to others in search of a much-needed professional opportunity. Pt was cooperative and open to therapeutic inquiry. Session concluded with pt in good emotional control.  [Return in ____ week(s)] : Return in [unfilled] week(s) [FreeTextEntry1] : continue twice weekly psychotherapy and medication management. begin modern masculinities group.

## 2023-03-20 NOTE — PHYSICAL EXAM
[Well groomed] : well groomed [Average] : average [Cooperative] : cooperative [Anxious] : anxious [Full] : full [Rapid] : rapid [Linear/Goal Directed] : linear/goal directed [WNL] : within normal limits [FreeTextEntry8] : frustrated [de-identified] : tearful at times

## 2023-03-23 ENCOUNTER — APPOINTMENT (OUTPATIENT)
Dept: PSYCHIATRY | Facility: CLINIC | Age: 58
End: 2023-03-23
Payer: MEDICAID

## 2023-03-23 PROCEDURE — 90834 PSYTX W PT 45 MINUTES: CPT | Mod: 95

## 2023-03-23 PROCEDURE — 99215 OFFICE O/P EST HI 40 MIN: CPT | Mod: 95

## 2023-03-23 NOTE — REASON FOR VISIT
[Patient preference] : as per patient preference [Other Location: e.g. Home (Enter Location, City,State)___] : The provider was located at [unfilled]. [Home] : The patient, [unfilled], was located at home, [unfilled], at the time of the visit. [Verbal consent obtained from patient/other participant(s)] : Verbal consent for telehealth/telephonic services obtained from patient/other participant(s) [FreeTextEntry4] : 11am [FreeTextEntry5] : [id number] [FreeTextEntry2] : 1/23/23 [Patient] : Patient [FreeTextEntry1] : Pt requests psychotherapy to focus on anhedonia, low motivation, feelings of "stuckness", processing his father's recent death, feelings of humiliation and guilt, perfectionism

## 2023-03-23 NOTE — REVIEW OF SYSTEMS
[Negative] : Allergic/Immunologic [FreeTextEntry9] : muscle tension [de-identified] : some anxiety and mood fluctuations

## 2023-03-23 NOTE — DISCUSSION/SUMMARY
[FreeTextEntry1] : Patient is a 57 year old white male, currently unemployed, former executive in cosmetics industry, domiciled with , with reported psychiatric history of bipolar disorder diagnosed in 2016, with no IPP admissions, initially presented to clinic to re-establish psychiatric care after having last seen psychiatrist in 2017 for medication management as well as therapy, presenting today for follow-up.\par On initial evaluation, patient had presented with symptoms suggestive of Generalized Anxiety Disorder, but did not appear acutely depressed, suicidal, manic, or psychotic. While patient reported history of Bipolar 1 Disorder, he did not report any lifetime manic episodes and patient's medication regimen (Lamictal 25 mg daily) would not likely have been effective in treatment of Bipolar 1 Disorder. \par  \par  Dx: Bipolar Spectrum Disorder\par \par Patient reports some dysphoria and anxiety as well as some fluctuating mood with periods of increased attention to projects and details; pt has also reported reduced sleep of 5-6 hrs, with history of naps,  30-60 minutes some days as well.\par \par Date of last physical exam: 6/22/2022\par Date of last (annual) labs: 6/22/2022  (see labs section for details) \par Annual review of systems completed (Y/N): Yes\par Tobacco Screening Completed (Y/N): Yes, no smoker\par \par \par \par

## 2023-03-23 NOTE — HISTORY OF PRESENT ILLNESS
[Not Applicable] : Not applicable [FreeTextEntry1] : Nilson reporting that he is overall feeling stable though continues with some anxiety and insomnia.   [FreeTextEntry2] : Dx with bipolar disorder in 2016, however no clear manic episode reported in lifetime. No IPP admissions in lifetime, saw 3 prior psychiatrists, last time was in 2017 - PMD continued prescribing psychiatric medications since then.  [FreeTextEntry3] : was first started on medications in 7382-3575: wellbutrin ER 300mg, lamotrigine 25 mg daily (started at 50mg, then brought down to 25mg), zolpidem 10mg PRN (1/2 of 1 PRN, says 30 days lasts 3 months. Denies any other medication trials.

## 2023-03-23 NOTE — PHYSICAL EXAM
[Average] : average [Cooperative] : cooperative [Euthymic] : euthymic [Anxious] : anxious [Full] : full [Clear] : clear [Linear/Goal Directed] : linear/goal directed [WNL] : within normal limits [No] : No

## 2023-03-23 NOTE — PHYSICAL EXAM
[Well groomed] : well groomed [Average] : average [Cooperative] : cooperative [Anxious] : anxious [Full] : full [Rapid] : rapid [Linear/Goal Directed] : linear/goal directed [WNL] : within normal limits [FreeTextEntry8] : frustrated [de-identified] : tearful at times

## 2023-03-23 NOTE — PLAN
[Yes. details: ___] : Yes, [unfilled] [Medication education provided] : Medication education provided. [Rationale for medication choices, possible risks/precautions, benefits, alternative treatment choices, and consequences of non-treatment discussed] : Rationale for medication choices, possible risks/precautions, benefits, alternative treatment choices, and consequences of non-treatment discussed with patient/family/caregiver  [FreeTextEntry5] : Offered support/encouragement, psychoeducation, counseling regarding: diagnosis/diagnoses, medications, symptoms, recommendations etc.\par \par Reviewed/discussed plan below:\par \par -Continue Bupropion XL  at 450 mg po daily; \par -May take Ambien 5 mg prn and to limit use; may continue to alternate with hydroxyzine 12.5-25 mg prn\par -Reviewed how pt's irritability when taking hydroxyzine may be a paradoxical reaction that could be related to BIpolar Illness\par -Reviewed again potential to try Lamictal for potential Bipolar Spectrum disorder and pt agreeable\par Reviewed R/B/SE including Lamictal rash and reviewed contacting MD and stopping if gets rash\par -Reviewed importance of slow Lamictal titration of 25 mg increase every 7 days (no less) \par -Pt may continue journaling regarding moods, energy, sleep etc and may review\par -Continue weekly therapy and writer may collaborate with pt's therapist prn\par -Contact writer prn\par \par 40 minutes spent reviewing prior records/notes, seeing patient and recording session note\par

## 2023-03-23 NOTE — PLAN
[FreeTextEntry2] : treatment will focus on supporting pt achieving his goals getting work, improving his mood, socializing more, decreasing perfectionism, and increasing motivation. [Psychodynamic Therapy] : Psychodynamic Therapy  [Psychoeducation] : Psychoeducation  [de-identified] : Pt arrived on time for session. Session focused on continuing to unpack pt's interpersonal impact and the difficulty of not being seen by others as he sees himself, and how this impacts his relationships with others. Discussed the need for control, and the anxiety aroused when he doesn't have it. Discussed beginning of group therapy. Pt was cooperative and open to therapeutic inquiry. Session concluded with pt in good emotional control.  [Return in ____ week(s)] : Return in [unfilled] week(s) [FreeTextEntry1] : continue twice weekly psychotherapy and medication management. begin modern masculinities group.

## 2023-03-23 NOTE — SOCIAL HISTORY
[FreeTextEntry1] : Pt has bachelor's degree in history, worked in beauty/cosmetic industry for 30 years, worked at [employer] but currently unemployed. Works as election poll worker seasonally. , living with . Born [place] raised [place], moved to [place], then [place] then [place], then [place], then [place] in 2009. \par Denies any abuse history.\par

## 2023-03-23 NOTE — REASON FOR VISIT
[Telehealth (audio & video) - Individual/Group] : This visit was provided via telehealth using real-time 2-way audio visual technology. [Other Location: e.g. Home (Enter Location, City,State)___] : The provider was located at [unfilled]. [Home] : The patient, [unfilled], was located at home, [unfilled], at the time of the visit. [Verbal consent obtained from patient/other participant(s)] : Verbal consent for telehealth/telephonic services obtained from patient/other participant(s) [Patient] : Patient [FreeTextEntry2] : 2/24/23 [FreeTextEntry1] : follow up treatment of DIMITRI, insomnia and Bipolar spectrum disorder

## 2023-03-27 ENCOUNTER — APPOINTMENT (OUTPATIENT)
Dept: PSYCHIATRY | Facility: CLINIC | Age: 58
End: 2023-03-27
Payer: MEDICAID

## 2023-03-27 DIAGNOSIS — Z00.00 ENCOUNTER FOR GENERAL ADULT MEDICAL EXAMINATION W/OUT ABNORMAL FINDINGS: ICD-10-CM

## 2023-03-27 PROCEDURE — 90834 PSYTX W PT 45 MINUTES: CPT

## 2023-03-27 NOTE — PHYSICAL EXAM
[Well groomed] : well groomed [Average] : average [Cooperative] : cooperative [Anxious] : anxious [Full] : full [Rapid] : rapid [Linear/Goal Directed] : linear/goal directed [WNL] : within normal limits [FreeTextEntry8] : frustrated [de-identified] : tearful at times

## 2023-03-27 NOTE — PLAN
[Psychodynamic Therapy] : Psychodynamic Therapy  [Psychoeducation] : Psychoeducation  [Return in ____ week(s)] : Return in [unfilled] week(s) [FreeTextEntry2] : treatment will focus on supporting pt achieving his goals getting work, improving his mood, socializing more, decreasing perfectionism, and increasing motivation. [de-identified] : Pt arrived on time for session. Session focused on exploring pt's understanding and experience of recent medication changes. Reviewed sx of insomnia, irritability, low mood, inconsistent energy, anxiety. Provided psychoeducation on possible side effects and what to notice with new medicine including rashes. Discussed pt's frustration with current political events. Discussed pt's avoidance of reaching out to professional colleagues for help with job search due to fears of rejection. Nevertheless, pt reported increased professional network and felt he was making positive steps in this regard. Pt was cooperative and open to therapeutic inquiry. Session concluded with pt in good emotional control.  [FreeTextEntry1] : continue twice weekly psychotherapy and medication management. begin modern masculinities group.

## 2023-03-30 ENCOUNTER — APPOINTMENT (OUTPATIENT)
Dept: PSYCHIATRY | Facility: CLINIC | Age: 58
End: 2023-03-30
Payer: MEDICAID

## 2023-03-30 PROCEDURE — 90834 PSYTX W PT 45 MINUTES: CPT | Mod: 95

## 2023-03-30 NOTE — REASON FOR VISIT
[Patient preference] : as per patient preference [Other Location: e.g. Home (Enter Location, City,State)___] : The provider was located at [unfilled]. [Home] : The patient, [unfilled], was located at home, [unfilled], at the time of the visit. [Verbal consent obtained from patient/other participant(s)] : Verbal consent for telehealth/telephonic services obtained from patient/other participant(s) [FreeTextEntry4] : 11am [FreeTextEntry5] : 2284kb [FreeTextEntry2] : 1/23/23 [Patient] : Patient [FreeTextEntry1] : Pt requests psychotherapy to focus on anhedonia, low motivation, feelings of "stuckness", processing his father's recent death, feelings of humiliation and guilt, perfectionism

## 2023-03-30 NOTE — PLAN
[FreeTextEntry2] : treatment will focus on supporting pt achieving his goals getting work, improving his mood, socializing more, decreasing perfectionism, and increasing motivation. [Psychodynamic Therapy] : Psychodynamic Therapy  [Psychoeducation] : Psychoeducation  [de-identified] : Pt arrived on time for session. Session focused on exploring pt's self-consciousness about his looks and his weight in the context of conflict around vulnerability. Discussed ways in which this impacts his life and the intensity of the anxiety he experiences when considering social interaction and the fear that he will "stick out." Explored developmental origins of these dynamics and considered the strength of the hold they have on his interpersonal life. Discussed beginning of group therapy. Pt was cooperative and open to therapeutic inquiry. Session concluded with pt in good emotional control.  [Return in ____ week(s)] : Return in [unfilled] week(s) [FreeTextEntry1] : continue twice weekly psychotherapy and medication management. begin modern masculinities group.

## 2023-03-30 NOTE — PHYSICAL EXAM
[Well groomed] : well groomed [Average] : average [Cooperative] : cooperative [Anxious] : anxious [Full] : full [Rapid] : rapid [Linear/Goal Directed] : linear/goal directed [WNL] : within normal limits [FreeTextEntry8] : frustrated [de-identified] : tearful at times

## 2023-04-03 ENCOUNTER — APPOINTMENT (OUTPATIENT)
Dept: PSYCHIATRY | Facility: CLINIC | Age: 58
End: 2023-04-03
Payer: MEDICAID

## 2023-04-03 PROCEDURE — 90834 PSYTX W PT 45 MINUTES: CPT

## 2023-04-03 NOTE — PHYSICAL EXAM
[Well groomed] : well groomed [Average] : average [Cooperative] : cooperative [Anxious] : anxious [Full] : full [Rapid] : rapid [Linear/Goal Directed] : linear/goal directed [WNL] : within normal limits [FreeTextEntry8] : frustrated [de-identified] : tearful at times

## 2023-04-03 NOTE — PLAN
[FreeTextEntry2] : treatment will focus on supporting pt achieving his goals getting work, improving his mood, socializing more, decreasing perfectionism, and increasing motivation. [Psychodynamic Therapy] : Psychodynamic Therapy  [Psychoeducation] : Psychoeducation  [de-identified] : Pt arrived on time for session. Pt reported somewhat improved mood, more engagement in self-care such as going for walks, going out to events, outreach for jobs etc... Explored interaction with previous work colleague that he found flattering and uplifting and to which he is eagerly looking forward. Pt was cooperative and open to therapeutic inquiry. Session concluded with pt in good emotional control.  [Return in ____ week(s)] : Return in [unfilled] week(s) [FreeTextEntry1] : continue twice weekly psychotherapy and medication management. begin modern masculinities group.

## 2023-04-06 ENCOUNTER — APPOINTMENT (OUTPATIENT)
Dept: PSYCHIATRY | Facility: CLINIC | Age: 58
End: 2023-04-06
Payer: MEDICAID

## 2023-04-06 PROCEDURE — 90834 PSYTX W PT 45 MINUTES: CPT | Mod: 95

## 2023-04-06 NOTE — PHYSICAL EXAM
[Well groomed] : well groomed [Average] : average [Cooperative] : cooperative [Anxious] : anxious [Full] : full [Rapid] : rapid [Linear/Goal Directed] : linear/goal directed [WNL] : within normal limits [FreeTextEntry8] : frustrated [de-identified] : tearful at times

## 2023-04-06 NOTE — REASON FOR VISIT
[Patient preference] : as per patient preference [Other Location: e.g. Home (Enter Location, City,State)___] : The provider was located at [unfilled]. [Home] : The patient, [unfilled], was located at home, [unfilled], at the time of the visit. [Verbal consent obtained from patient/other participant(s)] : Verbal consent for telehealth/telephonic services obtained from patient/other participant(s) [FreeTextEntry4] : 11am [FreeTextEntry5] : [id number] [FreeTextEntry2] : 1/23/23 [FreeTextEntry1] : Pt requests psychotherapy to focus on anhedonia, low motivation, feelings of "stuckness", processing his father's recent death, feelings of humiliation and guilt, perfectionism [Patient] : Patient

## 2023-04-06 NOTE — PLAN
[FreeTextEntry2] : treatment will focus on supporting pt achieving his goals getting work, improving his mood, socializing more, decreasing perfectionism, and increasing motivation. [Psychodynamic Therapy] : Psychodynamic Therapy  [Psychoeducation] : Psychoeducation  [de-identified] : Pt arrived on time for session. Session focused on exploring conflict about receiving job search support from others, and how pt experiences shame in receiving help. Identified and explored cognitive distortions around job history, and tendency toward personalization. Utilized mentalization to help pt see what kind of support he would benefit from externally and how to  himself in these ways. Pt was cooperative and open to therapeutic inquiry. Session concluded with pt in good emotional control.  [Return in ____ week(s)] : Return in [unfilled] week(s) [FreeTextEntry1] : continue twice weekly psychotherapy and medication management. begin modern masculinities group.

## 2023-04-10 ENCOUNTER — APPOINTMENT (OUTPATIENT)
Dept: PSYCHIATRY | Facility: CLINIC | Age: 58
End: 2023-04-10
Payer: MEDICAID

## 2023-04-10 PROCEDURE — 90834 PSYTX W PT 45 MINUTES: CPT

## 2023-04-10 NOTE — PHYSICAL EXAM
[Well groomed] : well groomed [Average] : average [Cooperative] : cooperative [Anxious] : anxious [Full] : full [Rapid] : rapid [Linear/Goal Directed] : linear/goal directed [WNL] : within normal limits [FreeTextEntry8] : frustrated [de-identified] : tearful at times

## 2023-04-10 NOTE — PLAN
[FreeTextEntry2] : treatment will focus on supporting pt achieving his goals getting work, improving his mood, socializing more, decreasing perfectionism, and increasing motivation. [Psychodynamic Therapy] : Psychodynamic Therapy  [Psychoeducation] : Psychoeducation  [de-identified] : Pt arrived on time for session. Pt reported somewhat improved mood, more engagement in self-care such as going for walks, going out to events. Session focused on exploring interpersonal dynamics around conflict around asking for ones needs to be met directly and pt's tendency to invalidate others' responses when they reciprocate in what he had asked for. Explored the negative consequences of this dynamic, and the possibilities for relational connection that are missed when pt experiences others in this way, and how it serves to reinforce his negative view of himself by attributing their words as ingenuine. Pt appreciative of writer's praise, and able to state this with encouragement. Pt was cooperative and open to therapeutic inquiry. Session concluded with pt in good emotional control.  [Return in ____ week(s)] : Return in [unfilled] week(s) [FreeTextEntry1] : continue twice weekly psychotherapy and medication management. begin modern masculinities group.

## 2023-04-12 ENCOUNTER — APPOINTMENT (OUTPATIENT)
Dept: PSYCHIATRY | Facility: CLINIC | Age: 58
End: 2023-04-12
Payer: MEDICAID

## 2023-04-12 PROCEDURE — 99214 OFFICE O/P EST MOD 30 MIN: CPT | Mod: 95

## 2023-04-12 NOTE — REVIEW OF SYSTEMS
[Negative] : Allergic/Immunologic [FreeTextEntry9] : muscle tension [de-identified] : some anxiety and mood fluctuations

## 2023-04-12 NOTE — PLAN
[Yes. details: ___] : Yes, [unfilled] [Medication education provided] : Medication education provided. [Rationale for medication choices, possible risks/precautions, benefits, alternative treatment choices, and consequences of non-treatment discussed] : Rationale for medication choices, possible risks/precautions, benefits, alternative treatment choices, and consequences of non-treatment discussed with patient/family/caregiver  [FreeTextEntry5] : Offered support/encouragement, psychoeducation, counseling regarding: diagnosis/diagnoses, medications, symptoms, recommendations etc.\par \par Reviewed/discussed plan below:\par \par -Continue Bupropion XL  at 450 mg po daily; \par -May take Ambien 5 mg prn and to limit use; may continue to alternate with hydroxyzine 25 mg prn (1-2 times a week)\par -May stay on Lamictal at 75 mg a day for now and consider titration up in future\par -Continue weekly therapy and writer may collaborate with pt's therapist prn\par -Will be starting Men's group this Monday at 5 pm\par -Contact writer prn\par \par 30 minutes spent reviewing prior records/notes, seeing patient and recording session note\par

## 2023-04-12 NOTE — SOCIAL HISTORY
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 , living with . Born [place] raised [place], moved to [place], then [place], then [place], then [place], then [place] in 2009. \par Denies any abuse history.\par

## 2023-04-12 NOTE — HISTORY OF PRESENT ILLNESS
[Not Applicable] : Not applicable [FreeTextEntry1] : Nilson reporting that he is doing well overall.  Hasn't been sleeping so well but stated he's been coughing at night which is partly the reason.  Overall feels his mood is improved and he is perhaps a little less anxious as well.   [FreeTextEntry2] : Dx with bipolar disorder in 2016, however no clear manic episode reported in lifetime. No IPP admissions in lifetime, saw 3 prior psychiatrists, last time was in 2017 - PMD continued prescribing psychiatric medications since then.  [FreeTextEntry3] : was first started on medications in 0604-0649: wellbutrin ER 300mg, lamotrigine 25 mg daily (started at 50mg, then brought down to 25mg), zolpidem 10mg PRN (1/2 of 1 PRN, says 30 days lasts 3 months. Denies any other medication trials.

## 2023-04-12 NOTE — DISCUSSION/SUMMARY
[FreeTextEntry1] : Patient is a 57 year old white male, currently unemployed, former executive in cosmetics industry, domiciled with , with reported psychiatric history of bipolar disorder diagnosed in 2016, with no IPP admissions, initially presented to clinic to re-establish psychiatric care after having last seen psychiatrist in 2017 for medication management as well as therapy, presenting today for follow-up.\par On initial evaluation, patient had presented with symptoms suggestive of Generalized Anxiety Disorder, but did not appear acutely depressed, suicidal, manic, or psychotic. While patient reported history of Bipolar 1 Disorder, he did not report any lifetime manic episodes and patient's medication regimen (Lamictal 25 mg daily) would not likely have been effective in treatment of Bipolar 1 Disorder. \par  \par  Dx: Bipolar Spectrum Disorder\par \par Patient reports some improvements overall though still with some anxiety and sleep issues. \par \par Date of last physical exam: 6/22/2022\par Date of last (annual) labs: 6/22/2022  (see labs section for details) \par Annual review of systems completed (Y/N): Yes\par Tobacco Screening Completed (Y/N): Yes, no smoker\par \par \par \par

## 2023-04-13 ENCOUNTER — APPOINTMENT (OUTPATIENT)
Dept: PSYCHIATRY | Facility: CLINIC | Age: 58
End: 2023-04-13
Payer: MEDICAID

## 2023-04-13 PROCEDURE — 90834 PSYTX W PT 45 MINUTES: CPT | Mod: 95

## 2023-04-13 NOTE — REASON FOR VISIT
[Patient preference] : as per patient preference [Other Location: e.g. Home (Enter Location, City,State)___] : The provider was located at [unfilled]. [Home] : The patient, [unfilled], was located at home, [unfilled], at the time of the visit. [Verbal consent obtained from patient/other participant(s)] : Verbal consent for telehealth/telephonic services obtained from patient/other participant(s) [FreeTextEntry4] : 11am [FreeTextEntry5] : [id number] [FreeTextEntry2] : 4/10/23 [Patient] : Patient [FreeTextEntry1] : Pt requests psychotherapy to focus on anhedonia, low motivation, feelings of "stuckness", processing his father's recent death, feelings of humiliation and guilt, perfectionism

## 2023-04-13 NOTE — PLAN
[FreeTextEntry2] : treatment will focus on supporting pt achieving his goals getting work, improving his mood, socializing more, decreasing perfectionism, and increasing motivation. [Psychodynamic Therapy] : Psychodynamic Therapy  [Psychoeducation] : Psychoeducation  [de-identified] : Pt arrived on time for session. Session focused on pt's progress in terms of increased activity, network, socializing, as well as exploring pt's continued avoidance of applying for jobs, updating his resume etc. Pt was cooperative and open to therapeutic inquiry. Session concluded with pt in good emotional control.  [Return in ____ week(s)] : Return in [unfilled] week(s) [FreeTextEntry1] : continue twice weekly psychotherapy and medication management. begin modern masculinities group.

## 2023-04-13 NOTE — PHYSICAL EXAM
[Well groomed] : well groomed [Average] : average [Cooperative] : cooperative [Anxious] : anxious [Full] : full [Rapid] : rapid [Linear/Goal Directed] : linear/goal directed [WNL] : within normal limits [FreeTextEntry8] : frustrated [de-identified] : tearful at times

## 2023-04-17 ENCOUNTER — APPOINTMENT (OUTPATIENT)
Dept: PSYCHIATRY | Facility: CLINIC | Age: 58
End: 2023-04-17
Payer: MEDICAID

## 2023-04-17 PROCEDURE — 90834 PSYTX W PT 45 MINUTES: CPT | Mod: 59

## 2023-04-17 PROCEDURE — 90853 GROUP PSYCHOTHERAPY: CPT

## 2023-04-17 NOTE — PLAN
[FreeTextEntry2] : treatment will focus on supporting pt achieving his goals getting work, improving his mood, socializing more, decreasing perfectionism, and increasing motivation. [Psychodynamic Therapy] : Psychodynamic Therapy  [Psychoeducation] : Psychoeducation  [de-identified] : Pt arrived on time for session. Pt reported ongoing improvement in mood, energy, motivation, and activity. Session focused on discussion of these changes and positive impact on pt's relationship with partner. Also explored pt's self-esteem and conflict around weight, especially with summer coming up and pt's desire to lose weight. Pt was cooperative and open to therapeutic inquiry. Session concluded with pt in good emotional control.  [Return in ____ week(s)] : Return in [unfilled] week(s) [FreeTextEntry1] : continue twice weekly psychotherapy, medication management, modern masculinities group.

## 2023-04-17 NOTE — END OF VISIT
[Duration of Psychotherapy Visit (minutes spent in synchronous communication): ____] : Duration of Psychotherapy Visit (minutes spent in synchronous communication): [unfilled] [Individual Psychotherapy for 38-52 minutes] : Individual Psychotherapy for 38 - 52 minutes [Licensed Clinician] : Licensed Clinician No

## 2023-04-17 NOTE — PHYSICAL EXAM
[Well groomed] : well groomed [Average] : average [Cooperative] : cooperative [Anxious] : anxious [Full] : full [Rapid] : rapid [Linear/Goal Directed] : linear/goal directed [WNL] : within normal limits [FreeTextEntry8] : frustrated [de-identified] : tearful at times

## 2023-04-18 NOTE — REASON FOR VISIT
[Other:___] : due to [unfilled] [Continuing, patient seen in-person within last 12 months] : Telehealth services are continuing as patient has been seen in-person within last 12 months. [Telehealth (audio & video) - Individual/Group] : This visit was provided via telehealth using real-time 2-way audio visual technology. [Medical Office: ([facility])___] : The provider was located at the medical office in [unfilled]. [Home] : The patient, [unfilled], was located at home, [unfilled], at the time of the visit. [Verbal consent obtained from patient/other participant(s)] : Verbal consent for telehealth/telephonic services obtained from patient/other participant(s) [FreeTextEntry4] : 5pm [FreeTextEntry5] : 6pm

## 2023-04-18 NOTE — DISCUSSION/SUMMARY
[Once a week] : once a week [60 minutes] : 60 minutes [de-identified] : Modern Masculinities Group [FreeTextEntry8] : Number of patients in group: 6\par 1. Discuss/Explore issues regarding experiences arising from being socialized as male including the impact on relationships and vulnerability \par 2. Develop relational insight into pt’s social impact on others and better understand patterns related to the impact of others on them. Learn/develop boundaries in social situations \par 3. Learn to identify and tolerate a wider range of emotions and develop language around the discussion of emotions. \par 4. Develop techniques to self regulate/self soothe in overwhelming circumstances, and how to learn to rely on others for co-regulation.  [FreeTextEntry4] : Pt arrived on time for session and participated actively throughout. He shared about himself, his goals for group, and about his own experiences with masculinity, loneliness, self-criticism, and relationships. He also engaged well with other group members and asked questions. Left in good emotional/behavioral control.  [de-identified] : Plan: continue twice weekly therapy, medication management, group therapy.

## 2023-04-20 ENCOUNTER — APPOINTMENT (OUTPATIENT)
Dept: PSYCHIATRY | Facility: CLINIC | Age: 58
End: 2023-04-20

## 2023-04-24 ENCOUNTER — APPOINTMENT (OUTPATIENT)
Dept: PSYCHIATRY | Facility: CLINIC | Age: 58
End: 2023-04-24
Payer: MEDICAID

## 2023-04-24 PROCEDURE — 90834 PSYTX W PT 45 MINUTES: CPT

## 2023-04-24 PROCEDURE — 90853 GROUP PSYCHOTHERAPY: CPT | Mod: 95,59

## 2023-04-24 NOTE — PHYSICAL EXAM
[Well groomed] : well groomed [Average] : average [Cooperative] : cooperative [Anxious] : anxious [Full] : full [Rapid] : rapid [Linear/Goal Directed] : linear/goal directed [WNL] : within normal limits [FreeTextEntry8] : frustrated [de-identified] : tearful at times

## 2023-04-24 NOTE — PLAN
[Psychodynamic Therapy] : Psychodynamic Therapy  [Psychoeducation] : Psychoeducation  [Return in ____ week(s)] : Return in [unfilled] week(s) [FreeTextEntry2] : treatment will focus on supporting pt achieving his goals getting work, improving his mood, socializing more, decreasing perfectionism, and increasing motivation. [de-identified] : Pt arrived on time for session. Pt reported ongoing improvement in mood, energy, motivation, and activity. Session focused on exploring past week's group therapy experience. Pt reported anxiety about sharing but felt the group went well and was looking forward to this week's group session. Dyad also discussed pt's use of social media and desire to engage with conservative opinions to try to correct those he disagrees with. Discussed sublimation of anger. Also processed therapeutic relationship and pt's concerns that he had offended writer - dyad able to engage in reassurance and repair. Pt was cooperative and open to therapeutic inquiry. Session concluded with pt in good emotional control.  [FreeTextEntry1] : continue twice weekly psychotherapy, medication management, modern masculinities group.

## 2023-04-25 NOTE — REASON FOR VISIT
[Other:___] : due to [unfilled] [Continuing, patient seen in-person within last 12 months] : Telehealth services are continuing as patient has been seen in-person within last 12 months. [Telehealth (audio & video) - Individual/Group] : This visit was provided via telehealth using real-time 2-way audio visual technology. [Medical Office: ([facility])___] : The provider was located at the medical office in [unfilled]. [Home] : The patient, [unfilled], was located at home, [unfilled], at the time of the visit. [Verbal consent obtained from patient/other participant(s)] : Verbal consent for telehealth/telephonic services obtained from patient/other participant(s) [FreeTextEntry4] : 5pm [FreeTextEntry5] : 6pm

## 2023-04-25 NOTE — DISCUSSION/SUMMARY
[Once a week] : once a week [60 minutes] : 60 minutes [de-identified] : Modern Masculinities Group [FreeTextEntry8] : Number of patients in group: 6\par 1. Discuss/Explore issues regarding experiences arising from being socialized as male including the impact on relationships and vulnerability \par 2. Develop relational insight into pt’s social impact on others and better understand patterns related to the impact of others on them. Learn/develop boundaries in social situations \par 3. Learn to identify and tolerate a wider range of emotions and develop language around the discussion of emotions. \par 4. Develop techniques to self regulate/self soothe in overwhelming circumstances, and how to learn to rely on others for co-regulation.  [FreeTextEntry4] : Pt arrived on time for session and participated actively throughout. Pt shared about his own experiences growing up, what it was like for him to come out as [place], how that affected his relationships with both women and men, and what it was like to live through the HIV/AIDS crisis in the 80s. He related well with other group members and asked questions of others to promote engagement and relationality. Left in good emotional/behavioral control.  [de-identified] : Plan: continue twice weekly therapy, medication management, group therapy.

## 2023-04-27 ENCOUNTER — APPOINTMENT (OUTPATIENT)
Dept: PSYCHIATRY | Facility: CLINIC | Age: 58
End: 2023-04-27

## 2023-05-01 ENCOUNTER — APPOINTMENT (OUTPATIENT)
Dept: PSYCHIATRY | Facility: CLINIC | Age: 58
End: 2023-05-01
Payer: MEDICAID

## 2023-05-01 ENCOUNTER — OUTPATIENT (OUTPATIENT)
Dept: OUTPATIENT SERVICES | Facility: HOSPITAL | Age: 58
LOS: 1 days | Discharge: HOME | End: 2023-05-01

## 2023-05-01 PROCEDURE — 90834 PSYTX W PT 45 MINUTES: CPT

## 2023-05-01 PROCEDURE — 90853 GROUP PSYCHOTHERAPY: CPT | Mod: 95

## 2023-05-01 NOTE — PLAN
[FreeTextEntry2] : treatment will focus on supporting pt achieving his goals getting work, improving his mood, socializing more, decreasing perfectionism, and increasing motivation. [Psychodynamic Therapy] : Psychodynamic Therapy  [Psychoeducation] : Psychoeducation  [de-identified] : Pt arrived on time for session. Session focused on processing the loss of a friend, and reminiscing on pt's relationship with this friend. Pt also discussed his experience of group therapy, and how he is looking forward to the group deepening, but also how appreciative he is for other group members sharing. Also discussed pt's experience of masculinity as a [place] man and the social compulsory dictates of masculinity - explored how pt is able to question these, but struggles to question dictates around status and professionalism, which cause him much shame. Pt was cooperative and open to therapeutic inquiry. Session concluded with pt in good emotional control.  [Return in ____ week(s)] : Return in [unfilled] week(s) [FreeTextEntry1] : continue twice weekly psychotherapy, medication management, modern masculinities group.

## 2023-05-01 NOTE — PHYSICAL EXAM
[Well groomed] : well groomed [Average] : average [Cooperative] : cooperative [Anxious] : anxious [Full] : full [Rapid] : rapid [Linear/Goal Directed] : linear/goal directed [WNL] : within normal limits [FreeTextEntry8] : frustrated [de-identified] : tearful at times

## 2023-05-02 NOTE — REASON FOR VISIT
[Other:___] : due to [unfilled] [Continuing, patient seen in-person within last 12 months] : Telehealth services are continuing as patient has been seen in-person within last 12 months. [Telehealth (audio & video) - Individual/Group] : This visit was provided via telehealth using real-time 2-way audio visual technology. [Medical Office: ([facility])___] : The provider was located at the medical office in [unfilled]. [Home] : The patient, [unfilled], was located at home, [unfilled], at the time of the visit. [Verbal consent obtained from patient/other participant(s)] : Verbal consent for telehealth/telephonic services obtained from patient/other participant(s) [FreeTextEntry4] : 5pm [FreeTextEntry5] : 6pm

## 2023-05-02 NOTE — DISCUSSION/SUMMARY
[Once a week] : once a week [60 minutes] : 60 minutes [de-identified] : Modern Masculinities Group [FreeTextEntry8] : Number of patients in group: 6\par 1. Discuss/Explore issues regarding experiences arising from being socialized as male including the impact on relationships and vulnerability \par 2. Develop relational insight into pt’s social impact on others and better understand patterns related to the impact of others on them. Learn/develop boundaries in social situations \par 3. Learn to identify and tolerate a wider range of emotions and develop language around the discussion of emotions. \par 4. Develop techniques to self regulate/self soothe in overwhelming circumstances, and how to learn to rely on others for co-regulation.  [FreeTextEntry4] : Pt arrived on time for session and participated actively throughout session. He shared about his relationships with female co-workers in the past and how he learned to listen more openly. Also shared about his experiences being harshly self-critical, and how difficult it has been for him to shift he approach in this way. Left in good emotional/behavioral control.  [de-identified] : Plan: continue twice weekly therapy, medication management, group therapy.

## 2023-05-04 ENCOUNTER — APPOINTMENT (OUTPATIENT)
Dept: PSYCHIATRY | Facility: CLINIC | Age: 58
End: 2023-05-04
Payer: MEDICAID

## 2023-05-04 PROCEDURE — 90834 PSYTX W PT 45 MINUTES: CPT | Mod: 95

## 2023-05-04 NOTE — PLAN
[FreeTextEntry2] : treatment will focus on supporting pt achieving his goals getting work, improving his mood, socializing more, decreasing perfectionism, and increasing motivation. [Psychodynamic Therapy] : Psychodynamic Therapy  [Psychoeducation] : Psychoeducation  [de-identified] : Pt arrived on time for session. Session explored pt's experience of group therapy from past week, and examined his spoken and unspoken reactions during group. Explored the role  of vulnerability in what pt chose not to share and the fears around bringing is full identity as a [place] man to group. Pt also continued to process the loss of a close friend and the loss and guilt he continues to experience. Dyad also explored pt's interest in politics as an animating force in his life that also has a tendency to make him feel powerless and  angry.  Pt was cooperative and open to therapeutic inquiry. Session concluded with pt in good emotional control.  [Return in ____ week(s)] : Return in [unfilled] week(s) [FreeTextEntry1] : continue twice weekly psychotherapy, medication management, modern masculinities group.

## 2023-05-04 NOTE — PHYSICAL EXAM
[Well groomed] : well groomed [Average] : average [Cooperative] : cooperative [Anxious] : anxious [Full] : full [Rapid] : rapid [Linear/Goal Directed] : linear/goal directed [WNL] : within normal limits [FreeTextEntry8] : frustrated [de-identified] : tearful at times

## 2023-05-08 ENCOUNTER — APPOINTMENT (OUTPATIENT)
Dept: PSYCHIATRY | Facility: CLINIC | Age: 58
End: 2023-05-08
Payer: MEDICAID

## 2023-05-08 PROCEDURE — 90834 PSYTX W PT 45 MINUTES: CPT

## 2023-05-08 PROCEDURE — 90853 GROUP PSYCHOTHERAPY: CPT | Mod: 95

## 2023-05-08 NOTE — PLAN
[FreeTextEntry2] : treatment will focus on supporting pt achieving his goals getting work, improving his mood, socializing more, decreasing perfectionism, and increasing motivation. [Psychodynamic Therapy] : Psychodynamic Therapy  [Psychoeducation] : Psychoeducation  [de-identified] : Pt arrived on time for session. Session focused on exploring pt's decision to finally get a haircut after nearly a year, which marks a significant step in his clinical improvement, and represents a shift toward the outside from the inside, and an openness toward putting himself out there professionally, and socially. Explored how pt's interest in politics has served as a placeholder for the social care he previously had an outlet for socially and at work, but which is no missing from his current situation. Pt was cooperative and open to therapeutic inquiry. Session concluded with pt in good emotional control.  [Return in ____ week(s)] : Return in [unfilled] week(s) [FreeTextEntry1] : continue twice weekly psychotherapy, medication management, modern masculinities group.

## 2023-05-08 NOTE — PHYSICAL EXAM
[Well groomed] : well groomed [Average] : average [Cooperative] : cooperative [Anxious] : anxious [Full] : full [Rapid] : rapid [Linear/Goal Directed] : linear/goal directed [WNL] : within normal limits [FreeTextEntry8] : frustrated [de-identified] : tearful at times

## 2023-05-10 NOTE — REASON FOR VISIT
[Other:___] : due to [unfilled] [Continuing, patient seen in-person within last 12 months] : Telehealth services are continuing as patient has been seen in-person within last 12 months. [Telehealth (audio & video) - Individual/Group] : This visit was provided via telehealth using real-time 2-way audio visual technology. [Medical Office: ([facility])___] : The provider was located at the medical office in [unfilled]. [Home] : The patient, [unfilled], was located at home, [unfilled], at the time of the visit. [Verbal consent obtained from patient/other participant(s)] : Verbal consent for telehealth/telephonic services obtained from patient/other participant(s) [FreeTextEntry4] : 5pm [FreeTextEntry5] : 6pm

## 2023-05-10 NOTE — DISCUSSION/SUMMARY
[Once a week] : once a week [60 minutes] : 60 minutes [de-identified] : Modern Masculinities Group [FreeTextEntry8] : Number of patients in group: 6\par 1. Discuss/Explore issues regarding experiences arising from being socialized as male including the impact on relationships and vulnerability \par 2. Develop relational insight into pt’s social impact on others and better understand patterns related to the impact of others on them. Learn/develop boundaries in social situations \par 3. Learn to identify and tolerate a wider range of emotions and develop language around the discussion of emotions. \par 4. Develop techniques to self regulate/self soothe in overwhelming circumstances, and how to learn to rely on others for co-regulation.  [FreeTextEntry4] : Pt arrived on time for session and participated actively throughout session. Pt shared about his experiences in theapy and his conflict around vuonerability, such as his experience that when he puts words to experiences he has been avoiding, it makes them "real." He shared about his mother's suicide and his fears that he was becoming more like her and had suicidal thoughts in the past (but denied present suicidal ideation). Left in good emotional/behavioral control.  [de-identified] : Plan: continue twice weekly therapy, medication management, group therapy.

## 2023-05-11 ENCOUNTER — APPOINTMENT (OUTPATIENT)
Dept: PSYCHIATRY | Facility: CLINIC | Age: 58
End: 2023-05-11
Payer: MEDICAID

## 2023-05-11 PROCEDURE — 99214 OFFICE O/P EST MOD 30 MIN: CPT | Mod: 95

## 2023-05-11 NOTE — SOCIAL HISTORY
[FreeTextEntry1] : Pt has bachelor's degree in history, worked in beauty/cosmetic industry for 30 years, worked at [employer] but currently unemployed. Works as election poll worker seasonally. , living with . Born [place] raised [place], moved to [place], then [place], then [place], then [place], then [place] in 2009. \par Denies any abuse history.\par

## 2023-05-11 NOTE — DISCUSSION/SUMMARY
[FreeTextEntry1] : Patient is a 57 year old white male, currently unemployed, former executive in cosmetics industry, domiciled with , with reported psychiatric history of bipolar disorder diagnosed in 2016, with no IPP admissions, initially presented to clinic to re-establish psychiatric care after having last seen psychiatrist in 2017 for medication management as well as therapy, presenting today for follow-up.\par On initial evaluation, patient had presented with symptoms suggestive of Generalized Anxiety Disorder, but did not appear acutely depressed, suicidal, manic, or psychotic. While patient reported history of Bipolar 1 Disorder, he did not report any lifetime manic episodes and patient's medication regimen (Lamictal 25 mg daily) would not likely have been effective in treatment of Bipolar 1 Disorder. \par  \par  Dx: Bipolar Spectrum Disorder\par \par Patient reports some improvements in mood and motivation.  \par \par \par  [Date of Last Physical Exam: _____] : Date of Last Physical Exam: [unfilled] [Date of Last Annual Labs: _____] : Date of Last Annual Labs: [unfilled] [Annual Review of Systems Completed?] : Annual Review of Systems Completed: Yes [Tobacco Screening Completed?] : Tobacco Screening Completed: Yes

## 2023-05-11 NOTE — PLAN
[Yes. details: ___] : Yes, [unfilled] [Medication education provided] : Medication education provided. [Rationale for medication choices, possible risks/precautions, benefits, alternative treatment choices, and consequences of non-treatment discussed] : Rationale for medication choices, possible risks/precautions, benefits, alternative treatment choices, and consequences of non-treatment discussed with patient/family/caregiver  [FreeTextEntry5] : Offered support/encouragement, psychoeducation, counseling regarding: diagnosis/diagnoses, medications, symptoms, recommendations etc.\par \par Reviewed/discussed plan below:\par \par -Continue Bupropion XL  at 450 mg po daily and moving to the 450 mg pill, #90\par -May take Ambien 5 mg prn and to continue very limited use; may take hydroxyzine 25 mg prn (1-4 times a week as needed)\par -May increase Lamictal to 100 mg a day for now and consider titration up in future prn\par -Continue weekly therapy and writer may collaborate with pt's therapist prn\par -To continue Men's group this Monday at 5 pm\par -Pt to let writer know about pharmacy that he will want all scripts going to\par -Contact writer prn\par \par 30 minutes spent reviewing prior records/notes, seeing patient and recording session note\par

## 2023-05-11 NOTE — HISTORY OF PRESENT ILLNESS
[Not Applicable] : Not applicable [FreeTextEntry1] : Nilson reporting that he is doing well overall.  Reports that he's overall more engaged in activities on Lamictal 75 mg and feels improved from where he was.  Spoke of being engaged in individual and group therapy as well.   [FreeTextEntry2] : Dx with bipolar disorder in 2016, however no clear manic episode reported in lifetime. No IPP admissions in lifetime, saw 3 prior psychiatrists, last time was in 2017 - PMD continued prescribing psychiatric medications since then.  [FreeTextEntry3] : was first started on medications in [id number]: wellbutrin ER 300mg, lamotrigine 25 mg daily (started at 50mg, then brought down to 25mg), zolpidem 10mg PRN (1/2 of 1 PRN, says 30 days lasts 3 months. Denies any other medication trials.

## 2023-05-11 NOTE — PHYSICAL EXAM
[Average] : average [Cooperative] : cooperative [Euthymic] : euthymic [Anxious] : anxious [Full] : full [Clear] : clear [Linear/Goal Directed] : linear/goal directed [WNL] : within normal limits [No] : No [Individual reports tobacco use during the last 30 days?] : Individual reports tobacco use during the last 30 days? No

## 2023-05-15 ENCOUNTER — APPOINTMENT (OUTPATIENT)
Dept: PSYCHIATRY | Facility: CLINIC | Age: 58
End: 2023-05-15
Payer: MEDICAID

## 2023-05-15 PROCEDURE — 90853 GROUP PSYCHOTHERAPY: CPT | Mod: 95,59

## 2023-05-15 PROCEDURE — 90834 PSYTX W PT 45 MINUTES: CPT

## 2023-05-15 NOTE — PHYSICAL EXAM
[Well groomed] : well groomed [Average] : average [Cooperative] : cooperative [Anxious] : anxious [Full] : full [Rapid] : rapid [Linear/Goal Directed] : linear/goal directed [WNL] : within normal limits [FreeTextEntry8] : frustrated [de-identified] : tearful at times

## 2023-05-15 NOTE — PLAN
[FreeTextEntry2] : treatment will focus on supporting pt achieving his goals getting work, improving his mood, socializing more, decreasing perfectionism, and increasing motivation. [Psychodynamic Therapy] : Psychodynamic Therapy  [Psychoeducation] : Psychoeducation  [de-identified] : Pt arrived on time for session. Session focused on processing pt's experience of mother's day, as his mother  by suicide twenty years previously. Discussed effective use of coping strategies to manage grief. Reported he felt good telling MM group about his mother's death, and hoped it would create space for more vulnerability in group. Also expressed gratitude for writer for inviting him to join group. Pt also reported some impulsivity in his speech, that he's speaking without thinking more often now - correlated this to increased lamictal - will continue to monitor. Pt was cooperative and open to therapeutic inquiry. Session concluded with pt in good emotional control.  [Return in ____ week(s)] : Return in [unfilled] week(s) [FreeTextEntry1] : continue twice weekly psychotherapy, medication management, modern masculinities group.

## 2023-05-17 NOTE — DISCUSSION/SUMMARY
[Once a week] : once a week [60 minutes] : 60 minutes [de-identified] : [facility] Masculinities Group [FreeTextEntry8] : Number of patients in group: 6\par 1. Discuss/Explore issues regarding experiences arising from being socialized as male including the impact on relationships and vulnerability \par 2. Develop relational insight into pt’s social impact on others and better understand patterns related to the impact of others on them. Learn/develop boundaries in social situations \par 3. Learn to identify and tolerate a wider range of emotions and develop language around the discussion of emotions. \par 4. Develop techniques to self regulate/self soothe in overwhelming circumstances, and how to learn to rely on others for co-regulation.  [FreeTextEntry4] : Pt arrived on time for session and participated actively throughout session. He shared about his insecurity as it relates to struggling occupationally. He was able to take in support from other group members in this regard, as well as to identify with others experiencing similar difficulties. Left in good emotional/behavioral control.  [de-identified] : Plan: continue twice weekly therapy, medication management, group therapy.

## 2023-05-17 NOTE — REASON FOR VISIT
[Other:___] : due to [unfilled] [Continuing, patient seen in-person within last 12 months] : Telehealth services are continuing as patient has been seen in-person within last 12 months. [Telehealth (audio & video) - Individual/Group] : This visit was provided via telehealth using real-time 2-way audio visual technology. [Medical Office: ([facility])___] : The provider was located at the medical office in [unfilled]. [Home] : The patient, [unfilled], was located at home, [unfilled], at the time of the visit. [Verbal consent obtained from patient/other participant(s)] : Verbal consent for telehealth/telephonic services obtained from patient/other participant(s) [FreeTextEntry4] : 5pm [FreeTextEntry5] : 6pm

## 2023-05-18 ENCOUNTER — APPOINTMENT (OUTPATIENT)
Dept: PSYCHIATRY | Facility: CLINIC | Age: 58
End: 2023-05-18
Payer: MEDICAID

## 2023-05-18 PROCEDURE — 90834 PSYTX W PT 45 MINUTES: CPT | Mod: 95

## 2023-05-18 NOTE — PLAN
[FreeTextEntry2] : treatment will focus on supporting pt achieving his goals getting work, improving his mood, socializing more, decreasing perfectionism, and increasing motivation. will update treatment plan and duration in August. [Psychodynamic Therapy] : Psychodynamic Therapy  [Psychoeducation] : Psychoeducation  [de-identified] : Pt arrived on time for session. Session focused on exploring a recent social experience in which pt was vulnerable, "put himself out there," and was able to make a new friend. Validated pt's experience of excitement, and explored his newfound sense that he is "more than his situation." Also processed pt's experience of struggling with group members when he doesn't know "what he can do to help."  Pt was cooperative and open to therapeutic inquiry. Session concluded with pt in good emotional control.  [Return in ____ week(s)] : Return in [unfilled] week(s) [FreeTextEntry1] : continue twice weekly psychotherapy, medication management, modern masculinities group.

## 2023-05-18 NOTE — PHYSICAL EXAM
[Well groomed] : well groomed [Average] : average [Cooperative] : cooperative [Anxious] : anxious [Full] : full [Rapid] : rapid [Linear/Goal Directed] : linear/goal directed [WNL] : within normal limits [FreeTextEntry8] : frustrated [de-identified] : tearful at times

## 2023-05-22 ENCOUNTER — APPOINTMENT (OUTPATIENT)
Dept: PSYCHIATRY | Facility: CLINIC | Age: 58
End: 2023-05-22
Payer: MEDICAID

## 2023-05-22 PROCEDURE — 90834 PSYTX W PT 45 MINUTES: CPT

## 2023-05-22 PROCEDURE — 90853 GROUP PSYCHOTHERAPY: CPT | Mod: 95

## 2023-05-22 NOTE — PLAN
[FreeTextEntry2] : treatment will focus on supporting pt achieving his goals getting work, improving his mood, socializing more, decreasing perfectionism, and increasing motivation. will revisit treatment plan and duration in August. [Psychodynamic Therapy] : Psychodynamic Therapy  [Psychoeducation] : Psychoeducation  [de-identified] : Pt arrived on time for session. Session focused on exploring pt's relationship with a new friend, and the progress he feels he has been making socially and professionally along with increased exercise. Also explored how pt feels supported in terms of his identity with writer in group. Pt was cooperative and open to therapeutic inquiry. Session concluded with pt in good emotional control.  [Recommended Frequency of Visits: ____] : Recommended frequency of visits: [unfilled] [Return in ____ week(s)] : Return in [unfilled] week(s) [FreeTextEntry1] : continue twice weekly psychotherapy, medication management, modern masculinities group.

## 2023-05-22 NOTE — PHYSICAL EXAM
[Well groomed] : well groomed [Average] : average [Cooperative] : cooperative [Anxious] : anxious [Full] : full [Rapid] : rapid [Linear/Goal Directed] : linear/goal directed [WNL] : within normal limits [FreeTextEntry8] : frustrated [de-identified] : tearful at times

## 2023-05-23 NOTE — DISCUSSION/SUMMARY
[Once a week] : once a week [60 minutes] : 60 minutes [de-identified] : Modern Masculinities Group [FreeTextEntry8] : Number of patients in group: 6\par 1. Discuss/Explore issues regarding experiences arising from being socialized as male including the impact on relationships and vulnerability \par 2. Develop relational insight into pt’s social impact on others and better understand patterns related to the impact of others on them. Learn/develop boundaries in social situations \par 3. Learn to identify and tolerate a wider range of emotions and develop language around the discussion of emotions. \par 4. Develop techniques to self regulate/self soothe in overwhelming circumstances, and how to learn to rely on others for co-regulation.  [FreeTextEntry4] : Pt arrived on time for session and participated actively throughout session. He shared about his insecurity as it relates to struggling occupationally. He attempted to console another pt struggling with his experience at his job and help him know it might not be his fault. Was able to engage with interpersonal process. Left in good emotional/behavioral control.  [de-identified] : Plan: continue twice weekly therapy, medication management, group therapy.

## 2023-05-25 ENCOUNTER — APPOINTMENT (OUTPATIENT)
Dept: PSYCHIATRY | Facility: CLINIC | Age: 58
End: 2023-05-25
Payer: MEDICAID

## 2023-05-25 PROCEDURE — 90834 PSYTX W PT 45 MINUTES: CPT | Mod: 95

## 2023-05-25 NOTE — PLAN
[FreeTextEntry2] : treatment will focus on supporting pt achieving his goals getting work, improving his mood, socializing more, decreasing perfectionism, and increasing motivation. will update treatment plan and duration in August. [Psychodynamic Therapy] : Psychodynamic Therapy  [Psychoeducation] : Psychoeducation  [de-identified] : Pt arrived on time for session. Session focused on processing a relational moment from previous session in which writer stated he would defend the pt against any homophobia that could potentially come up in group and how moved he was by the sentiment. Explored how pt doesn't always feel fully supported in other parts of his life and how he struggles to ask for it or even be aware of the need. Explored pt's desire to celebrate pride in MM group. Explored pt's feelings of hurt and disappointment when other group members do not attend. Writer encouraged pt to bring these feelings up in group and also tied them to similar patterns of avoidance discussed in session.  Pt was cooperative and open to therapeutic inquiry. Session concluded with pt in good emotional control.  [Return in ____ week(s)] : Return in [unfilled] week(s) [FreeTextEntry1] : continue twice weekly psychotherapy, medication management, modern masculinities group.

## 2023-05-25 NOTE — PHYSICAL EXAM
[Well groomed] : well groomed [Average] : average [Cooperative] : cooperative [Anxious] : anxious [Full] : full [Rapid] : rapid [Linear/Goal Directed] : linear/goal directed [WNL] : within normal limits [FreeTextEntry8] : frustrated [de-identified] : tearful at times

## 2023-05-25 NOTE — REASON FOR VISIT
[Patient preference] : as per patient preference [Telehealth (audio & video) - Individual/Group] : This visit was provided via telehealth using real-time 2-way audio visual technology. [Other Location: e.g. Home (Enter Location, City,State)___] : The provider was located at [unfilled]. [Home] : The patient, [unfilled], was located at home, [unfilled], at the time of the visit. [Verbal consent obtained from patient/other participant(s)] : Verbal consent for telehealth/telephonic services obtained from patient/other participant(s) [FreeTextEntry4] : 11am [FreeTextEntry5] : 5541cm [FreeTextEntry2] : 5/22/23 [Patient] : Patient [FreeTextEntry1] : Pt requests psychotherapy to focus on anhedonia, low motivation, feelings of "stuckness", processing his father's recent death, feelings of humiliation and guilt, perfectionism

## 2023-06-01 ENCOUNTER — APPOINTMENT (OUTPATIENT)
Dept: PSYCHIATRY | Facility: CLINIC | Age: 58
End: 2023-06-01
Payer: MEDICAID

## 2023-06-01 PROCEDURE — 99213 OFFICE O/P EST LOW 20 MIN: CPT | Mod: 95

## 2023-06-01 PROCEDURE — 90834 PSYTX W PT 45 MINUTES: CPT | Mod: 95

## 2023-06-01 NOTE — DISCUSSION/SUMMARY
[Date of Last Physical Exam: _____] : Date of Last Physical Exam: [unfilled] [Date of Last Annual Labs: _____] : Date of Last Annual Labs: [unfilled] [Annual Review of Systems Completed?] : Annual Review of Systems Completed: Yes [Tobacco Screening Completed?] : Tobacco Screening Completed: Yes [FreeTextEntry1] : Patient is a 57 year old white male, currently unemployed, former executive in cosmetics industry, domiciled with , with reported psychiatric history of bipolar disorder diagnosed in 2016, with no IPP admissions, initially presented to clinic to re-establish psychiatric care after having last seen psychiatrist in 2017 for medication management as well as therapy, presenting today for follow-up.\par On initial evaluation, patient had presented with symptoms suggestive of Generalized Anxiety Disorder, but did not appear acutely depressed, suicidal, manic, or psychotic. While patient reported history of Bipolar 1 Disorder, he did not report any lifetime manic episodes and patient's medication regimen (Lamictal 25 mg daily) would not likely have been effective in treatment of Bipolar 1 Disorder. \par  \par  Dx: Bipolar Spectrum Disorder\par \par Patient reports some improvements in mood and motivation.  \par \par \par

## 2023-06-01 NOTE — REASON FOR VISIT
[Patient preference] : as per patient preference [Telehealth (audio & video) - Individual/Group] : This visit was provided via telehealth using real-time 2-way audio visual technology. [Other Location: e.g. Home (Enter Location, City,State)___] : The provider was located at [unfilled]. [Home] : The patient, [unfilled], was located at home, [unfilled], at the time of the visit. [Verbal consent obtained from patient/other participant(s)] : Verbal consent for telehealth/telephonic services obtained from patient/other participant(s) [FreeTextEntry4] : 11am [FreeTextEntry5] : [id number] [FreeTextEntry2] : 5/22/23 [Patient] : Patient [FreeTextEntry1] : Pt requests psychotherapy to focus on anhedonia, low motivation, feelings of "stuckness", processing his father's recent death, feelings of humiliation and guilt, perfectionism

## 2023-06-01 NOTE — PLAN
[FreeTextEntry2] : treatment will focus on supporting pt achieving his goals getting work, improving his mood, socializing more, decreasing perfectionism, and increasing motivation. will update treatment plan and duration in August. [Psychodynamic Therapy] : Psychodynamic Therapy  [Psychoeducation] : Psychoeducation  [de-identified] : Pt arrived on time for session. Session focused exploring pt's difficulty with shame around his financial circumstances and how he struggles to cope with this shame through maladaptive strategies such as avoidance, rather than more productive coping strategies. Explored possible more effective coping strategies. Also explored pt's experience of being an only child and how this led to decreased frustration tolerance, especially with himself.  Pt was cooperative and open to therapeutic inquiry. Session concluded with pt in good emotional control.  [Return in ____ week(s)] : Return in [unfilled] week(s) [FreeTextEntry1] : continue twice weekly psychotherapy, medication management, modern masculinities group.

## 2023-06-01 NOTE — PHYSICAL EXAM
[Well groomed] : well groomed [Average] : average [Cooperative] : cooperative [Anxious] : anxious [Full] : full [Rapid] : rapid [Linear/Goal Directed] : linear/goal directed [WNL] : within normal limits [FreeTextEntry8] : frustrated [de-identified] : tearful at times

## 2023-06-01 NOTE — PLAN
[Yes. details: ___] : Yes, [unfilled] [Medication education provided] : Medication education provided. [Rationale for medication choices, possible risks/precautions, benefits, alternative treatment choices, and consequences of non-treatment discussed] : Rationale for medication choices, possible risks/precautions, benefits, alternative treatment choices, and consequences of non-treatment discussed with patient/family/caregiver  [FreeTextEntry5] : Offered support/encouragement, psychoeducation, counseling regarding: diagnosis/diagnoses, medications, symptoms, recommendations etc.\par \par Reviewed/discussed plan below:\par \par -Continue Bupropion XL  at 450 mg po daily \par -May take Ambien 5 mg prn very sporadically and to continue very limited use; may take hydroxyzine 25 mg prn (2-4 times a week on average)\par -May continue Lamictal to 100 mg a day for now \par -Continue weekly therapy and writer may collaborate with pt's therapist prn\par -To continue Men's group this Monday at 5 pm\par -Pt to let writer know when to shift to mail order\par -Contact writer prn\par \par 20  minutes spent reviewing prior records/notes, seeing patient and recording session note\par

## 2023-06-01 NOTE — HISTORY OF PRESENT ILLNESS
[Not Applicable] : Not applicable [FreeTextEntry2] : Dx with bipolar disorder in 2016, however no clear manic episode reported in lifetime. No IPP admissions in lifetime, saw 3 prior psychiatrists, last time was in 2017 - PMD continued prescribing psychiatric medications since then.  [FreeTextEntry1] : Nilson reporting that he is doing well overall.  Has been more motivated to do things including getting out more, walking more etc.  Stating that he feels he's tolerating hydroxyzine okay and not feeling it is causing excess irritability as he'd thought previously. [FreeTextEntry3] : was first started on medications in [id number]: wellbutrin ER 300mg, lamotrigine 25 mg daily (started at 50mg, then brought down to 25mg), zolpidem 10mg PRN (1/2 of 1 PRN, says 30 days lasts 3 months. Denies any other medication trials.

## 2023-06-01 NOTE — SOCIAL HISTORY
[FreeTextEntry1] : Pt has bachelor's degree in history, worked in beauty/cosmetic industry for 30 years, worked at [employer] but currently unemployed. Works as election poll worker seasonally. , living with . Born [place] raised [place], moved [place], then [place] then [place], then [place], then [place] in 2009. \par Denies any abuse history.\par

## 2023-06-01 NOTE — PHYSICAL EXAM
[Average] : average [Cooperative] : cooperative [Euthymic] : euthymic [Full] : full [Clear] : clear [Linear/Goal Directed] : linear/goal directed [WNL] : within normal limits [No] : No [Individual reports tobacco use during the last 30 days?] : Individual reports tobacco use during the last 30 days? No

## 2023-06-05 ENCOUNTER — APPOINTMENT (OUTPATIENT)
Dept: PSYCHIATRY | Facility: CLINIC | Age: 58
End: 2023-06-05
Payer: MEDICAID

## 2023-06-05 PROCEDURE — 90834 PSYTX W PT 45 MINUTES: CPT | Mod: 59

## 2023-06-05 PROCEDURE — 90853 GROUP PSYCHOTHERAPY: CPT | Mod: 95

## 2023-06-05 RX ORDER — BUPROPION HYDROCHLORIDE 300 MG/1
300 TABLET, EXTENDED RELEASE ORAL
Qty: 90 | Refills: 0 | Status: DISCONTINUED | COMMUNITY
Start: 2022-11-17 | End: 2023-06-05

## 2023-06-05 RX ORDER — BUPROPION HYDROCHLORIDE 150 MG/1
150 TABLET, EXTENDED RELEASE ORAL DAILY
Qty: 90 | Refills: 0 | Status: DISCONTINUED | COMMUNITY
Start: 2022-12-30 | End: 2023-06-05

## 2023-06-06 NOTE — DISCUSSION/SUMMARY
[Once a week] : once a week [de-identified] : Modern Masculinities Group [60 minutes] : 60 minutes [FreeTextEntry8] : Number of patients in group: 6\par 1. Discuss/Explore issues regarding experiences arising from being socialized as male including the impact on relationships and vulnerability \par 2. Develop relational insight into pt’s social impact on others and better understand patterns related to the impact of others on them. Learn/develop boundaries in social situations \par 3. Learn to identify and tolerate a wider range of emotions and develop language around the discussion of emotions. \par 4. Develop techniques to self regulate/self soothe in overwhelming circumstances, and how to learn to rely on others for co-regulation.  [FreeTextEntry4] : Pt arrived on time for session and participated actively throughout session. He shared with the group that June is men's mental health awareness month as well as pride. He also shared about his own experience with psychiatric medication, relating it to the need for insulin for those with diabetes. Acknowledged how much easier it is to give advice than take it. Left in good emotional/behavioral control.  [de-identified] : Plan: continue twice weekly therapy, medication management, group therapy.

## 2023-06-06 NOTE — PLAN
[FreeTextEntry2] : treatment will focus on supporting pt achieving his goals getting work, improving his mood, socializing more, decreasing perfectionism, and increasing motivation. will revisit treatment plan and duration in August. [Psychodynamic Therapy] : Psychodynamic Therapy  [Psychoeducation] : Psychoeducation  [de-identified] : Pt arrived on time for session. Session focused on reviewing behavioral activation strategies due to increased depressive sx during memorial day weekend, particularly lethargy, low motivation, anhedonia. Explored pt's avoidance of engaging in activities he knows could make him feel better due to feeling like "it's not worth it." Denied SI. Also discussed pt's ambivalence around bringing up pride month in MM group. Pt was cooperative and open to therapeutic inquiry. Session concluded with pt in good emotional control.  [Recommended Frequency of Visits: ____] : Recommended frequency of visits: [unfilled] [Return in ____ week(s)] : Return in [unfilled] week(s) [FreeTextEntry1] : continue twice weekly psychotherapy, medication management, modern masculinities group.

## 2023-06-06 NOTE — REASON FOR VISIT
[Other:___] : due to [unfilled] [Continuing, patient seen in-person within last 12 months] : Telehealth services are continuing as patient has been seen in-person within last 12 months. [Telehealth (audio & video) - Individual/Group] : This visit was provided via telehealth using real-time 2-way audio visual technology. [Medical Office: ([facility])___] : The provider was located at the medical office in [unfilled]. [Verbal consent obtained from patient/other participant(s)] : Verbal consent for telehealth/telephonic services obtained from patient/other participant(s) [Home] : The patient, [unfilled], was located at home, [unfilled], at the time of the visit. [FreeTextEntry4] : 5pm [FreeTextEntry5] : 6pm

## 2023-06-06 NOTE — PHYSICAL EXAM
[Well groomed] : well groomed [Average] : average [Cooperative] : cooperative [Anxious] : anxious [Full] : full [Rapid] : rapid [Linear/Goal Directed] : linear/goal directed [WNL] : within normal limits [FreeTextEntry8] : frustrated [de-identified] : tearful at times

## 2023-06-08 ENCOUNTER — APPOINTMENT (OUTPATIENT)
Dept: PSYCHIATRY | Facility: CLINIC | Age: 58
End: 2023-06-08
Payer: MEDICAID

## 2023-06-08 PROCEDURE — 90834 PSYTX W PT 45 MINUTES: CPT | Mod: 95

## 2023-06-08 NOTE — REASON FOR VISIT
[Patient preference] : as per patient preference [Continuing, patient seen in-person within last 12 months] : Telehealth services are continuing as patient has been seen in-person within last 12 months. [Telehealth (audio & video) - Individual/Group] : This visit was provided via telehealth using real-time 2-way audio visual technology. [Other Location: e.g. Home (Enter Location, City,State)___] : The provider was located at [unfilled]. [Home] : The patient, [unfilled], was located at home, [unfilled], at the time of the visit. [Verbal consent obtained from patient/other participant(s)] : Verbal consent for telehealth/telephonic services obtained from patient/other participant(s) [FreeTextEntry4] : 11 [FreeTextEntry5] : [id number] [FreeTextEntry2] : 6/5/23 [Patient] : Patient [FreeTextEntry1] : Pt requests psychotherapy to focus on anhedonia, low motivation, feelings of "stuckness", processing his father's recent death, feelings of humiliation and guilt, perfectionism

## 2023-06-08 NOTE — PHYSICAL EXAM
[Well groomed] : well groomed [Average] : average [Cooperative] : cooperative [Anxious] : anxious [Full] : full [Rapid] : rapid [Linear/Goal Directed] : linear/goal directed [WNL] : within normal limits [FreeTextEntry8] : frustrated [de-identified] : tearful at times

## 2023-06-08 NOTE — PLAN
[FreeTextEntry2] : treatment will focus on supporting pt achieving his goals getting work, improving his mood, socializing more, decreasing perfectionism, and increasing motivation. will revisit treatment plan and duration in August. [Psychodynamic Therapy] : Psychodynamic Therapy  [Psychoeducation] : Psychoeducation  [de-identified] : Pt arrived on time for session. Session focused initially on processing conflicted feelings that arose in relationship to another group member on 6/5. Also processed a newly budding friendship and the kinds of interpersonal assumptions pt tends to make, and how he has experienced a number of corrective experiences. Generally, pt appears to be experiencing improved mood and motivation, improved self care such as exercise and interest in volunteer opportunities. Pt was cooperative and open to therapeutic inquiry. Session concluded with pt in good emotional control.  [Recommended Frequency of Visits: ____] : Recommended frequency of visits: [unfilled] [Return in ____ week(s)] : Return in [unfilled] week(s) [FreeTextEntry1] : continue twice weekly psychotherapy, medication management, modern masculinities group.

## 2023-06-12 ENCOUNTER — APPOINTMENT (OUTPATIENT)
Dept: PSYCHIATRY | Facility: CLINIC | Age: 58
End: 2023-06-12

## 2023-06-12 DIAGNOSIS — F34.1 DYSTHYMIC DISORDER: ICD-10-CM

## 2023-06-15 ENCOUNTER — APPOINTMENT (OUTPATIENT)
Dept: PSYCHIATRY | Facility: CLINIC | Age: 58
End: 2023-06-15

## 2023-06-19 ENCOUNTER — APPOINTMENT (OUTPATIENT)
Dept: PSYCHIATRY | Facility: CLINIC | Age: 58
End: 2023-06-19
Payer: MEDICAID

## 2023-06-19 PROCEDURE — 90834 PSYTX W PT 45 MINUTES: CPT | Mod: 59

## 2023-06-19 PROCEDURE — 90853 GROUP PSYCHOTHERAPY: CPT | Mod: 95

## 2023-06-19 NOTE — PHYSICAL EXAM
[Well groomed] : well groomed [Average] : average [Cooperative] : cooperative [Anxious] : anxious [Full] : full [Rapid] : rapid [Linear/Goal Directed] : linear/goal directed [WNL] : within normal limits [FreeTextEntry8] : frustrated [de-identified] : tearful at times

## 2023-06-19 NOTE — PLAN
[FreeTextEntry2] : treatment will focus on supporting pt achieving his goals getting work, improving his mood, socializing more, decreasing perfectionism, and increasing motivation. will revisit treatment plan and duration in August. [Psychodynamic Therapy] : Psychodynamic Therapy  [Psychoeducation] : Psychoeducation  [de-identified] : Pt arrived on time for session. Session focused on exploring social interactions with others in pt's building as well as pt's tendency to "overcompensate," for fear that he will be judged or seen as having ulterior motives. Pt was cooperative and open to therapeutic inquiry. Session concluded with pt in good emotional control.  [Recommended Frequency of Visits: ____] : Recommended frequency of visits: [unfilled] [Return in ____ week(s)] : Return in [unfilled] week(s) [FreeTextEntry1] : continue twice weekly psychotherapy, medication management, modern masculinities group.

## 2023-06-19 NOTE — DISCUSSION/SUMMARY
[Once a week] : once a week [60 minutes] : 60 minutes [de-identified] : Modern Masculinities Group [FreeTextEntry8] : Number of patients in group: 3\par 1. Discuss/Explore issues regarding experiences arising from being socialized as male including the impact on relationships and vulnerability \par 2. Develop relational insight into pt’s social impact on others and better understand patterns related to the impact of others on them. Learn/develop boundaries in social situations \par 3. Learn to identify and tolerate a wider range of emotions and develop language around the discussion of emotions. \par 4. Develop techniques to self regulate/self soothe in overwhelming circumstances, and how to learn to rely on others for co-regulation.  [FreeTextEntry4] : Pt arrived on time for session and participated actively throughout session. Was open and curious in engaging other group members and shared about his own conflict with shame, and how his fear of failure prevents him from taking risks, causing him to "beat himself up" and experience more shame. Was able to mentalize the child who was spanked instead of encouraged. Left in good emotional/behavioral control.  [de-identified] : Plan: continue twice weekly therapy, medication management, group therapy.

## 2023-06-19 NOTE — REASON FOR VISIT
[Other:___] : due to [unfilled] [Continuing, patient seen in-person within last 12 months] : Telehealth services are continuing as patient has been seen in-person within last 12 months. [Telehealth (audio & video) - Individual/Group] : This visit was provided via telehealth using real-time 2-way audio visual technology. [Medical Office: ([facility])___] : The provider was located at the medical office in [unfilled]. [Home] : The patient, [unfilled], was located at home, [unfilled], at the time of the visit. [Verbal consent obtained from patient/other participant(s)] : Verbal consent for telehealth/telephonic services obtained from patient/other participant(s) [FreeTextEntry4] : 5pm [FreeTextEntry5] : 6pm

## 2023-06-22 ENCOUNTER — APPOINTMENT (OUTPATIENT)
Dept: PSYCHIATRY | Facility: CLINIC | Age: 58
End: 2023-06-22
Payer: MEDICAID

## 2023-06-22 PROCEDURE — 90834 PSYTX W PT 45 MINUTES: CPT | Mod: 95

## 2023-06-22 NOTE — PLAN
[FreeTextEntry2] : treatment will focus on supporting pt achieving his goals getting work, improving his mood, socializing more, decreasing perfectionism, and increasing motivation. will revisit treatment plan and duration in August. [Psychodynamic Therapy] : Psychodynamic Therapy  [Psychoeducation] : Psychoeducation  [de-identified] : Pt arrived on time for session. Session focused on exploring pt's experience of himself as other due to his emotional responsivity and empathy. Explored his fear that he could "go to pieces" in response to other's emotions. Explored how while there is social pressure not to be like this, there are also ways in which this is a strength. Explored how this is also related to his experience of being the youngest at school growing up, being in the closet, and how much pressure he always felt to fit in. Explored his feelings of connectedness toward another group member, and concern for that person. Pt was cooperative and open to therapeutic inquiry. Session concluded with pt in good emotional control.  [Recommended Frequency of Visits: ____] : Recommended frequency of visits: [unfilled] [Return in ____ week(s)] : Return in [unfilled] week(s) [FreeTextEntry1] : continue twice weekly psychotherapy, medication management, modern masculinities group.

## 2023-06-22 NOTE — REASON FOR VISIT
[Patient preference] : as per patient preference [Continuing, patient seen in-person within last 12 months] : Telehealth services are continuing as patient has been seen in-person within last 12 months. [Telehealth (audio & video) - Individual/Group] : This visit was provided via telehealth using real-time 2-way audio visual technology. [Other Location: e.g. Home (Enter Location, City,State)___] : The provider was located at [unfilled]. [Home] : The patient, [unfilled], was located at home, [unfilled], at the time of the visit. [Verbal consent obtained from patient/other participant(s)] : Verbal consent for telehealth/telephonic services obtained from patient/other participant(s) [FreeTextEntry4] : 11am [FreeTextEntry5] : [id number] [FreeTextEntry2] : 6/19 [Patient] : Patient [FreeTextEntry1] : Pt requests psychotherapy to focus on anhedonia, low motivation, feelings of "stuckness", processing his father's recent death, feelings of humiliation and guilt, perfectionism

## 2023-06-22 NOTE — PHYSICAL EXAM
[Well groomed] : well groomed [Average] : average [Cooperative] : cooperative [Anxious] : anxious [Full] : full [Rapid] : rapid [Linear/Goal Directed] : linear/goal directed [WNL] : within normal limits [FreeTextEntry8] : frustrated [de-identified] : tearful at times

## 2023-06-26 ENCOUNTER — APPOINTMENT (OUTPATIENT)
Dept: PSYCHIATRY | Facility: CLINIC | Age: 58
End: 2023-06-26
Payer: MEDICAID

## 2023-06-26 PROCEDURE — 90834 PSYTX W PT 45 MINUTES: CPT | Mod: 59,95

## 2023-06-26 PROCEDURE — 90853 GROUP PSYCHOTHERAPY: CPT | Mod: 95

## 2023-06-26 NOTE — REASON FOR VISIT
[FreeTextEntry4] : 3pm [FreeTextEntry5] : 345pm [FreeTextEntry2] : 6/19 [FreeTextEntry1] : Pt requests psychotherapy to focus on anhedonia, low motivation, feelings of "stuckness", processing his father's recent death, feelings of humiliation and guilt, perfectionism

## 2023-06-26 NOTE — DISCUSSION/SUMMARY
[Once a week] : once a week [60 minutes] : 60 minutes [de-identified] : Modern Masculinities Group [FreeTextEntry8] : Number of patients in group: 6\par 1. Discuss/Explore issues regarding experiences arising from being socialized as male including the impact on relationships and vulnerability \par 2. Develop relational insight into pt’s social impact on others and better understand patterns related to the impact of others on them. Learn/develop boundaries in social situations \par 3. Learn to identify and tolerate a wider range of emotions and develop language around the discussion of emotions. \par 4. Develop techniques to self regulate/self soothe in overwhelming circumstances, and how to learn to rely on others for co-regulation.  [FreeTextEntry4] : Pt arrived on time for session and participated actively throughout session. Pt shared is displeasure with group members who had missed sessions and how important group is for him. Shared his concern and admiration for another group member. Shared about his own fear of failure. Left in good emotional/behavioral control.  [de-identified] : Plan: continue twice weekly therapy, medication management, group therapy.

## 2023-06-26 NOTE — PLAN
[Psychodynamic Therapy] : Psychodynamic Therapy  [Psychoeducation] : Psychoeducation  [Recommended Frequency of Visits: ____] : Recommended frequency of visits: [unfilled] [Return in ____ week(s)] : Return in [unfilled] week(s)

## 2023-06-26 NOTE — PLAN
[FreeTextEntry2] : treatment will focus on supporting pt achieving his goals getting work, improving his mood, socializing more, decreasing perfectionism, and increasing motivation. will revisit treatment plan and duration in August. [de-identified] : Pt arrived on time for session. Session focused on exploring pt's identity as a [place] man, and the losses he experienced in the 80s and 90s to friends and partners due to HIV/AIDS. Explored pt's desire to educate and advocate as he did in those years, and the possible ways he could do that now. Explored his experiences of self-consciousness during pride due to his weight, and related this experiences to his childhood of growing up "overweight." Pt was cooperative and open to therapeutic inquiry. Session concluded with pt in good emotional control.  [FreeTextEntry1] : continue twice weekly psychotherapy, medication management, modern masculinities group.

## 2023-06-26 NOTE — PHYSICAL EXAM
[Well groomed] : well groomed [Average] : average [Cooperative] : cooperative [Anxious] : anxious [Full] : full [Rapid] : rapid [Linear/Goal Directed] : linear/goal directed [WNL] : within normal limits

## 2023-06-26 NOTE — REASON FOR VISIT
[Patient preference] : as per patient preference [Other Location: e.g. Home (Enter Location, City,State)___] : The provider was located at [unfilled]. [Patient] : Patient [Other:___] : due to [unfilled] [Continuing, patient seen in-person within last 12 months] : Telehealth services are continuing as patient has been seen in-person within last 12 months. [Telehealth (audio & video) - Individual/Group] : This visit was provided via telehealth using real-time 2-way audio visual technology. [Medical Office: ([facility])___] : The provider was located at the medical office in [unfilled]. [Home] : The patient, [unfilled], was located at home, [unfilled], at the time of the visit. [Verbal consent obtained from patient/other participant(s)] : Verbal consent for telehealth/telephonic services obtained from patient/other participant(s) [FreeTextEntry4] : 5pm [FreeTextEntry5] : 6pm

## 2023-06-28 ENCOUNTER — APPOINTMENT (OUTPATIENT)
Dept: PSYCHIATRY | Facility: CLINIC | Age: 58
End: 2023-06-28
Payer: MEDICAID

## 2023-06-28 PROCEDURE — 90834 PSYTX W PT 45 MINUTES: CPT | Mod: 95

## 2023-06-28 NOTE — REASON FOR VISIT
[Patient preference] : as per patient preference [Continuing, patient seen in-person within last 12 months] : Telehealth services are continuing as patient has been seen in-person within last 12 months. [Telehealth (audio & video) - Individual/Group] : This visit was provided via telehealth using real-time 2-way audio visual technology. [Other Location: e.g. Home (Enter Location, City,State)___] : The provider was located at [unfilled]. [Home] : The patient, [unfilled], was located at home, [unfilled], at the time of the visit. [Verbal consent obtained from patient/other participant(s)] : Verbal consent for telehealth/telephonic services obtained from patient/other participant(s) [FreeTextEntry4] : 1:15 [FreeTextEntry5] : 2pm [FreeTextEntry2] : 6/19 [Patient] : Patient [FreeTextEntry1] : Pt requests psychotherapy to focus on anhedonia, low motivation, feelings of "stuckness", processing his father's recent death, feelings of humiliation and guilt, perfectionism

## 2023-06-28 NOTE — PLAN
[FreeTextEntry2] : treatment will focus on supporting pt achieving his goals getting work, improving his mood, socializing more, decreasing perfectionism, and increasing motivation. will revisit treatment plan and duration in August. [Psychodynamic Therapy] : Psychodynamic Therapy  [Psychoeducation] : Psychoeducation  [de-identified] : Pt arrived on time for session. 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 Explored how these dynamics manifest in a recent dream, and how they dynamics are indicative of pt progress toward treatment goals, particularly around self-esteem and behavioral activation, as well as life goals of obtaining a new job. Explored how he was able to not take setbacks personally and potentially indulge in less negative self-talk. Pt was cooperative and open to therapeutic inquiry. Session concluded with pt in good emotional control.  [Recommended Frequency of Visits: ____] : Recommended frequency of visits: [unfilled] [Return in ____ week(s)] : Return in [unfilled] week(s) [FreeTextEntry1] : continue twice weekly psychotherapy, medication management, modern masculinities group.

## 2023-06-28 NOTE — PHYSICAL EXAM
[Well groomed] : well groomed [Average] : average [Cooperative] : cooperative [Anxious] : anxious [Full] : full [Rapid] : rapid [Linear/Goal Directed] : linear/goal directed [WNL] : within normal limits [FreeTextEntry8] : frustrated [de-identified] : tearful at times

## 2023-06-29 ENCOUNTER — APPOINTMENT (OUTPATIENT)
Dept: PSYCHIATRY | Facility: CLINIC | Age: 58
End: 2023-06-29
Payer: MEDICAID

## 2023-06-29 PROCEDURE — 99215 OFFICE O/P EST HI 40 MIN: CPT | Mod: 95

## 2023-06-29 NOTE — PLAN
[Yes. details: ___] : Yes, [unfilled] [Medication education provided] : Medication education provided. [Rationale for medication choices, possible risks/precautions, benefits, alternative treatment choices, and consequences of non-treatment discussed] : Rationale for medication choices, possible risks/precautions, benefits, alternative treatment choices, and consequences of non-treatment discussed with patient/family/caregiver  [FreeTextEntry5] : Offered support/encouragement, psychoeducation, counseling regarding: diagnosis/diagnoses, medications, symptoms, recommendations etc.\par \par Reviewed/discussed plan below:\par \par -Continue Bupropion XL  at 450 mg po daily \par -May continue to take Ambien, (half of a 5 mg prn) sporadically and to continue with very limited use; may take hydroxyzine 25 mg prn\par -May continue Lamictal to 100 mg a day for now \par -Continue weekly therapy and writer may collaborate with pt's therapist prn\par -To continue Men's group, Mondays at 5 pm\par -Contact writer prn\par \par 40 minutes spent reviewing prior records/notes, seeing patient and recording session note\par

## 2023-06-29 NOTE — REVIEW OF SYSTEMS
Pt. Resting in bed. No acute distress, RR equal and non labored, VSS. Bed in low, locked, and call light in reach. Side rails up X 2. Will continue to monitor.   [Negative] : Allergic/Immunologic [de-identified] : improved mood and overall lessened anxiety

## 2023-06-29 NOTE — RISK ASSESSMENT
[General Appearance - Alert] : alert [General Appearance - In No Acute Distress] : in no acute distress [Sclera] : the sclera and conjunctiva were normal [Extraocular Movements] : extraocular movements were intact [Outer Ear] : the ears and nose were normal in appearance [Examination Of The Oral Cavity] : the lips and gums were normal [Neck Appearance] : the appearance of the neck was normal [Neck Cervical Mass (___cm)] : no neck mass was observed [Jugular Venous Distention Increased] : there was no jugular-venous distention [Auscultation Breath Sounds / Voice Sounds] : lungs were clear to auscultation bilaterally [Heart Rate And Rhythm] : heart rate was normal and rhythm regular [Heart Sounds] : normal S1 and S2 [Heart Sounds Gallop] : no gallops [Murmurs] : no murmurs [Heart Sounds Pericardial Friction Rub] : no pericardial rub [Edema] : there was no peripheral edema [Bowel Sounds] : normal bowel sounds [Abdomen Soft] : soft [Abdomen Tenderness] : non-tender [Abdomen Mass (___ Cm)] : no abdominal mass palpated [Cervical Lymph Nodes Enlarged Anterior Bilaterally] : anterior cervical [Supraclavicular Lymph Nodes Enlarged Bilaterally] : supraclavicular [No CVA Tenderness] : no ~M costovertebral angle tenderness [No Spinal Tenderness] : no spinal tenderness [Abnormal Walk] : normal gait [] : no rash [No Focal Deficits] : no focal deficits [Oriented To Time, Place, And Person] : oriented to person, place, and time [Impaired Insight] : insight and judgment were intact [Affect] : the affect was normal [No, patient denies ideation or behavior] : No, patient denies ideation or behavior [Low acute suicide risk] : Low acute suicide risk [No] : No [Not clinically indicated] : Safety Plan completed/updated (for individuals at risk): Not clinically indicated

## 2023-06-29 NOTE — DISCUSSION/SUMMARY
[Date of Last Physical Exam: _____] : Date of Last Physical Exam: [unfilled] [Date of Last Annual Labs: _____] : Date of Last Annual Labs: [unfilled] [Annual Review of Systems Completed?] : Annual Review of Systems Completed: Yes [Tobacco Screening Completed?] : Tobacco Screening Completed: Yes [FreeTextEntry1] : Patient is a 57 year old white male, currently unemployed, former executive in cosmetics industry, domiciled with , with reported psychiatric history of bipolar disorder diagnosed in 2016, with no IPP admissions, initially presented to clinic to re-establish psychiatric care after having last seen psychiatrist in 2017 for medication management as well as therapy, presenting today for follow-up.\par On initial evaluation, patient had presented with symptoms suggestive of Generalized Anxiety Disorder, but did not appear acutely depressed, suicidal, manic, or psychotic. While patient reported history of Bipolar 1 Disorder, he did not report any lifetime manic episodes and patient's medication regimen (Lamictal 25 mg daily) would not likely have been effective in treatment of Bipolar 1 Disorder. \par  \par  Dx: Bipolar Spectrum Disorder\par \par Patient reports some improvements in mood, motivation and sleep.    \par \par \par

## 2023-06-29 NOTE — SOCIAL HISTORY
[FreeTextEntry1] : Pt has bachelor's degree in history, worked in beauty/cosmetic industry for 30 years, worked at [employer] but currently unemployed. Works as election poll worker seasonally. , living with . Born [place] raised [place], moved to [place], then [place], then [place], then [place], then [place] in 2009. \par Denies any abuse history.\par

## 2023-06-29 NOTE — HISTORY OF PRESENT ILLNESS
[Not Applicable] : Not applicable [FreeTextEntry1] : Nilson reports that he continues to do well.  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  Is keeping a good sleep schedule; reports that he's in bed by 12:30-1:30 am most nights and up 7-9 am).  Is taking hydroxyzine a little more often with some benefits. \par Spoke of keeping relatively busy in job search and with therapy twice weekly as well as Men's Group.  \par  [FreeTextEntry2] : Dx with bipolar disorder in 2016, however no clear manic episode reported in lifetime. No IPP admissions in lifetime, saw 3 prior psychiatrists, last time was in 2017 - PMD continued prescribing psychiatric medications since then.  [FreeTextEntry3] : was first started on medications in [id number]: wellbutrin ER 300mg, lamotrigine 25 mg daily (started at 50mg, then brought down to 25mg), zolpidem 10mg PRN (1/2 of 1 PRN, says 30 days lasts 3 months. Denies any other medication trials.

## 2023-07-03 ENCOUNTER — APPOINTMENT (OUTPATIENT)
Dept: PSYCHIATRY | Facility: CLINIC | Age: 58
End: 2023-07-03
Payer: MEDICAID

## 2023-07-03 PROCEDURE — 90853 GROUP PSYCHOTHERAPY: CPT | Mod: 95

## 2023-07-03 PROCEDURE — 90834 PSYTX W PT 45 MINUTES: CPT | Mod: 59

## 2023-07-03 NOTE — REASON FOR VISIT
[Other:___] : due to [unfilled] [Continuing, patient seen in-person within last 12 months] : Telehealth services are continuing as patient has been seen in-person within last 12 months. [Telehealth (audio & video) - Individual/Group] : This visit was provided via telehealth using real-time 2-way audio visual technology. [Medical Office: ([facility])___] : The provider was located at the medical office in [unfilled]. [Home] : The patient, [unfilled], was located at home, [unfilled], at the time of the visit. [Verbal consent obtained from patient/other participant(s)] : Verbal consent for telehealth/telephonic services obtained from patient/other participant(s) [FreeTextEntry4] : 5pm [FreeTextEntry5] : 6pm

## 2023-07-03 NOTE — REASON FOR VISIT
[Patient preference] : as per patient preference [Continuing, patient seen in-person within last 12 months] : Telehealth services are continuing as patient has been seen in-person within last 12 months. [Telehealth (audio & video) - Individual/Group] : This visit was provided via telehealth using real-time 2-way audio visual technology. [Other Location: e.g. Home (Enter Location, City,State)___] : The provider was located at [unfilled]. [Home] : The patient, [unfilled], was located at home, [unfilled], at the time of the visit. [Verbal consent obtained from patient/other participant(s)] : Verbal consent for telehealth/telephonic services obtained from patient/other participant(s) [FreeTextEntry4] : 3pm [FreeTextEntry5] : 345pm [FreeTextEntry2] : 6/19 [Patient] : Patient [FreeTextEntry1] : Pt requests psychotherapy to focus on anhedonia, low motivation, feelings of "stuckness", processing his father's recent death, feelings of humiliation and guilt, perfectionism

## 2023-07-03 NOTE — DISCUSSION/SUMMARY
[Once a week] : once a week [60 minutes] : 60 minutes [de-identified] : Modern Masculinities Group [FreeTextEntry8] : Number of patients in group: 4\par 1. Discuss/Explore issues regarding experiences arising from being socialized as male including the impact on relationships and vulnerability \par 2. Develop relational insight into pt’s social impact on others and better understand patterns related to the impact of others on them. Learn/develop boundaries in social situations \par 3. Learn to identify and tolerate a wider range of emotions and develop language around the discussion of emotions. \par 4. Develop techniques to self regulate/self soothe in overwhelming circumstances, and how to learn to rely on others for co-regulation.  [FreeTextEntry4] : Pt arrived on time for session and participated actively throughout session. Pt actively engaged other pts in sharing about their own recent difficulties. Provided empathy, curious questions, and advice appropriately. Also shared about his experience graduating high school early and generally finding himself the youngest one in various cohorts. Left in good emotional/behavioral control.  [de-identified] : Plan: continue twice weekly therapy, medication management, group therapy.

## 2023-07-03 NOTE — PLAN
[FreeTextEntry2] : treatment will focus on supporting pt achieving his goals getting work, improving his mood, socializing more, decreasing perfectionism, and increasing motivation. will revisit treatment plan and duration in August. [Psychodynamic Therapy] : Psychodynamic Therapy  [Psychoeducation] : Psychoeducation  [de-identified] : Pt arrived on time for session. Session focused on exploring recent low mood over past weekend, and pt's tendency toward introversion, lethargy, isolation, and "wallowing." Explored the possibility these behaviors are self punitive since he rationally knows these things do not make him feel better, yet he does them anyway, especially when he is aware experientially and consciously that behavioral activation is hugely beneficial to his mood. Provided interpretation and confrontation around this resistance. Pt was cooperative and open to therapeutic inquiry. Session concluded with pt in good emotional control.  [Recommended Frequency of Visits: ____] : Recommended frequency of visits: [unfilled] [Return in ____ week(s)] : Return in [unfilled] week(s) [FreeTextEntry1] : continue twice weekly psychotherapy, medication management, modern masculinities group.

## 2023-07-05 ENCOUNTER — APPOINTMENT (OUTPATIENT)
Dept: PSYCHIATRY | Facility: CLINIC | Age: 58
End: 2023-07-05
Payer: MEDICAID

## 2023-07-05 PROCEDURE — 90834 PSYTX W PT 45 MINUTES: CPT | Mod: 95

## 2023-07-05 NOTE — PLAN
[Psychodynamic Therapy] : Psychodynamic Therapy  [Psychoeducation] : Psychoeducation  [Recommended Frequency of Visits: ____] : Recommended frequency of visits: [unfilled] [Return in ____ week(s)] : Return in [unfilled] week(s) [FreeTextEntry2] : treatment will focus on supporting pt achieving his goals getting work, improving his mood, socializing more, decreasing perfectionism, and increasing motivation. will revisit treatment plan and duration in August. [de-identified] : Pt arrived on time for session. Session today focused on exploring pt's experience of process group (mm) from monday, in which he found himself internally frustrated by other group members but struggled to speak to this frustration due to fears of being offputting, upsetting others, or disrupting group cohesion. Discussed how this pattern is actually a central dynamic for this pt and how it relates to other patterns in his life, including with writer. Discussed past experiences in which he feared upsetting the other and the detrimental impacts this often had on these relationships. Encouraged pt to raise these feelings in group while also exploring the experience of risk that is intrinsic to raising such vulnerable feelings in group.  Pt was cooperative and open to therapeutic inquiry. Session concluded with pt in good emotional control.  [FreeTextEntry1] : continue twice weekly psychotherapy, medication management, modern masculinities group.

## 2023-07-05 NOTE — PHYSICAL EXAM
[Well groomed] : well groomed [Average] : average [Cooperative] : cooperative [Anxious] : anxious [Full] : full [Rapid] : rapid [Linear/Goal Directed] : linear/goal directed [WNL] : within normal limits [FreeTextEntry8] : frustrated [de-identified] : tearful at times

## 2023-07-05 NOTE — REASON FOR VISIT
[Patient preference] : as per patient preference [Continuing, patient seen in-person within last 12 months] : Telehealth services are continuing as patient has been seen in-person within last 12 months. [Telehealth (audio & video) - Individual/Group] : This visit was provided via telehealth using real-time 2-way audio visual technology. [Other Location: e.g. Home (Enter Location, City,State)___] : The provider was located at [unfilled]. [Home] : The patient, [unfilled], was located at home, [unfilled], at the time of the visit. [Verbal consent obtained from patient/other participant(s)] : Verbal consent for telehealth/telephonic services obtained from patient/other participant(s) [Patient] : Patient [FreeTextEntry4] : 1pm [FreeTextEntry5] : 145pm [FreeTextEntry2] : 6/19 [FreeTextEntry1] : Pt requests psychotherapy to focus on anhedonia, low motivation, feelings of "stuckness", processing his father's recent death, feelings of humiliation and guilt, perfectionism

## 2023-07-06 ENCOUNTER — APPOINTMENT (OUTPATIENT)
Dept: PSYCHIATRY | Facility: CLINIC | Age: 58
End: 2023-07-06

## 2023-07-10 ENCOUNTER — APPOINTMENT (OUTPATIENT)
Dept: PSYCHIATRY | Facility: CLINIC | Age: 58
End: 2023-07-10

## 2023-07-12 ENCOUNTER — APPOINTMENT (OUTPATIENT)
Dept: PSYCHIATRY | Facility: CLINIC | Age: 58
End: 2023-07-12
Payer: MEDICAID

## 2023-07-12 PROCEDURE — 90834 PSYTX W PT 45 MINUTES: CPT | Mod: 95

## 2023-07-12 NOTE — PHYSICAL EXAM
[Well groomed] : well groomed [Average] : average [Cooperative] : cooperative [Anxious] : anxious [Full] : full [Rapid] : rapid [Linear/Goal Directed] : linear/goal directed [WNL] : within normal limits [FreeTextEntry8] : frustrated [de-identified] : tearful at times

## 2023-07-12 NOTE — PLAN
[Psychodynamic Therapy] : Psychodynamic Therapy  [Psychoeducation] : Psychoeducation  [Recommended Frequency of Visits: ____] : Recommended frequency of visits: [unfilled] [Return in ____ week(s)] : Return in [unfilled] week(s) [FreeTextEntry2] : treatment will focus on supporting pt achieving his goals getting work, improving his mood, socializing more, decreasing perfectionism, and increasing motivation. will revisit treatment plan and duration in August. [de-identified] : Pt arrived on time for session. Session focused on discussing a documentary pt found very moving involving the AIDS epidemic, the history of music, and racism. Discussed the relational moment of sharing it with writer as an indicator of evolution of the relationship. Also discussed conflict around intimacy with his partner in light of self-consciousness around his body image. Pt was cooperative and open to therapeutic inquiry. Session concluded with pt in good emotional control.  [FreeTextEntry1] : continue twice weekly psychotherapy, medication management, modern masculinities group.

## 2023-07-17 ENCOUNTER — APPOINTMENT (OUTPATIENT)
Dept: PSYCHIATRY | Facility: CLINIC | Age: 58
End: 2023-07-17
Payer: MEDICAID

## 2023-07-17 PROCEDURE — 90853 GROUP PSYCHOTHERAPY: CPT | Mod: 95

## 2023-07-17 PROCEDURE — 90834 PSYTX W PT 45 MINUTES: CPT | Mod: 59

## 2023-07-17 PROCEDURE — 90834 PSYTX W PT 45 MINUTES: CPT

## 2023-07-17 NOTE — REASON FOR VISIT
[Patient preference] : as per patient preference [Continuing, patient seen in-person within last 12 months] : Telehealth services are continuing as patient has been seen in-person within last 12 months. [Telehealth (audio & video) - Individual/Group] : This visit was provided via telehealth using real-time 2-way audio visual technology. [Other Location: e.g. Home (Enter Location, City,State)___] : The provider was located at [unfilled]. [Home] : The patient, [unfilled], was located at home, [unfilled], at the time of the visit. [Verbal consent obtained from patient/other participant(s)] : Verbal consent for telehealth/telephonic services obtained from patient/other participant(s) [FreeTextEntry4] : 1pm [FreeTextEntry5] : 145pm [FreeTextEntry2] : 6/19 [Patient] : Patient [FreeTextEntry1] : Pt requests psychotherapy to focus on anhedonia, low motivation, feelings of "stuckness", processing his father's recent death, feelings of humiliation and guilt, perfectionism

## 2023-07-17 NOTE — DISCUSSION/SUMMARY
[Once a week] : once a week [60 minutes] : 60 minutes [de-identified] : Modern Masculinities Group [FreeTextEntry8] : Number of patients in group: 5\par 1. Discuss/Explore issues regarding experiences arising from being socialized as male including the impact on relationships and vulnerability \par 2. Develop relational insight into pt’s social impact on others and better understand patterns related to the impact of others on them. Learn/develop boundaries in social situations \par 3. Learn to identify and tolerate a wider range of emotions and develop language around the discussion of emotions. \par 4. Develop techniques to self regulate/self soothe in overwhelming circumstances, and how to learn to rely on others for co-regulation.  [FreeTextEntry4] : Pt arrived on time for session and participated actively throughout session. Pt actively engaged other pts in sharing about their own recent difficulties. Provided empathy, curious questions, and advice appropriately. Shared about his own experiences learning about intersectional oppression and how he's learned to navigate that in different settings. He also shared about how he looks forward to group each week.. Left in good emotional/behavioral control.  [de-identified] : Plan: continue twice weekly therapy, medication management, group therapy.

## 2023-07-17 NOTE — PHYSICAL EXAM
[Well groomed] : well groomed [Average] : average [Cooperative] : cooperative [Anxious] : anxious [Full] : full [Rapid] : rapid [Linear/Goal Directed] : linear/goal directed [WNL] : within normal limits [FreeTextEntry8] : frustrated [de-identified] : tearful at times

## 2023-07-17 NOTE — PLAN
[FreeTextEntry2] : treatment will focus on supporting pt achieving his goals getting work, improving his mood, socializing more, decreasing perfectionism, and increasing motivation. will revisit treatment plan and duration in August. [Psychodynamic Therapy] : Psychodynamic Therapy  [Psychoeducation] : Psychoeducation  [de-identified] : Pt arrived on time for session. Session focused on relationally exploring a shared moment regarding a documentary we had both seen, and the meaning it had for both pt and writer around the shared experience. Also discussed pt's avoidance and procrastination around working on his resume, and how that relates to fear of failure. Explored pt's tightly held belief system organized around this fear and how pt selectively attends to evidence that validates this fear while ignoring evidence to the country. Explored the meaning of clinging to this belief system.  Pt was cooperative and open to therapeutic inquiry. Session concluded with pt in good emotional control.  [Recommended Frequency of Visits: ____] : Recommended frequency of visits: [unfilled] [Return in ____ week(s)] : Return in [unfilled] week(s) [FreeTextEntry1] : continue twice weekly psychotherapy, medication management, modern masculinities group.

## 2023-07-17 NOTE — REASON FOR VISIT
[Other:___] : due to [unfilled] [Continuing, patient seen in-person within last 12 months] : Telehealth services are continuing as patient has been seen in-person within last 12 months. [Telehealth (audio & video) - Individual/Group] : This visit was provided via telehealth using real-time 2-way audio visual technology. [Medical Office: ([facility])___] : The provider was located at the medical office in [unfilled]. [Home] : The patient, [unfilled], was located at home, [unfilled], at the time of the visit. [Verbal consent obtained from patient/other participant(s)] : Verbal consent for telehealth/telephonic services obtained from patient/other participant(s) [FreeTextEntry4] : 5pm [FreeTextEntry5] : 6pm

## 2023-07-19 ENCOUNTER — APPOINTMENT (OUTPATIENT)
Dept: PSYCHIATRY | Facility: CLINIC | Age: 58
End: 2023-07-19

## 2023-07-19 NOTE — DISCUSSION/SUMMARY
[FreeTextEntry1] : Patient is a 57 year old white male, currently unemployed, former executive in cosmetics industry, domiciled with , with reported psychiatric history of bipolar disorder diagnosed in 2016, with no IPP admissions, initially presented to clinic to re-establish psychiatric care after having last seen psychiatrist in 2017 for medication management as well as therapy, presenting today for follow-up.\par On initial evaluation, patient had presented with symptoms suggestive of Generalized Anxiety Disorder, but did not appear acutely depressed, suicidal, manic, or psychotic. While patient reported history of Bipolar 1 Disorder, he did not report any lifetime manic episodes and patient's medication regimen (Lamictal 25 mg daily) would not likely have been effective in treatment of Bipolar 1Disorder. \par  \par Differential Dx: Major Depressive Disorder versus Cyclothymia versus Bipolar Spectrum Disorder\par \par Patient reports some dysphoria and anxiety as well as some fluctuating mood with periods of increased attention to projects and details; pt also reports reduced need for sleep of 5-6 hrs, though naps 30-60 minutes some days as well. SSKI Counseling:  I discussed with the patient the risks of SSKI including but not limited to thyroid abnormalities, metallic taste, GI upset, fever, headache, acne, arthralgias, paraesthesias, lymphadenopathy, easy bleeding, arrhythmias, and allergic reaction.

## 2023-07-24 ENCOUNTER — APPOINTMENT (OUTPATIENT)
Dept: PSYCHIATRY | Facility: CLINIC | Age: 58
End: 2023-07-24
Payer: MEDICAID

## 2023-07-24 PROCEDURE — 90834 PSYTX W PT 45 MINUTES: CPT

## 2023-07-24 PROCEDURE — 90853 GROUP PSYCHOTHERAPY: CPT | Mod: 95

## 2023-07-24 NOTE — PLAN
[FreeTextEntry2] : treatment will focus on supporting pt achieving his goals getting work, improving his mood, socializing more, decreasing perfectionism, and increasing motivation. will revisit treatment plan and duration in August. [Psychodynamic Therapy] : Psychodynamic Therapy  [Psychoeducation] : Psychoeducation  [de-identified] : Pt arrived on time for session. Session focused on processing last week's modern masculinities group, and pt's ambivalence around sharing certain feelings for fear of judgment. Related this to broader themes in recent sessions about how this fear dictates his behavior and results in withdrawal, such as with job applications as well. Explored the developmental component of this as well in relationship to his sexual orientation. Pt was cooperative and open to therapeutic inquiry. Session concluded with pt in good emotional control.  [Recommended Frequency of Visits: ____] : Recommended frequency of visits: [unfilled] [Return in ____ week(s)] : Return in [unfilled] week(s) [FreeTextEntry1] : continue twice weekly psychotherapy, medication management, modern masculinities group.

## 2023-07-24 NOTE — PHYSICAL EXAM
[Well groomed] : well groomed [Average] : average [Cooperative] : cooperative [Anxious] : anxious [Full] : full [Rapid] : rapid [Linear/Goal Directed] : linear/goal directed [WNL] : within normal limits [FreeTextEntry8] : frustrated [de-identified] : tearful at times

## 2023-07-25 NOTE — DISCUSSION/SUMMARY
[Once a week] : once a week [60 minutes] : 60 minutes [de-identified] : Modern Masculinities Group [FreeTextEntry8] : Number of patients in group: 5\par 1. Discuss/Explore issues regarding experiences arising from being socialized as male including the impact on relationships and vulnerability \par 2. Develop relational insight into pt’s social impact on others and better understand patterns related to the impact of others on them. Learn/develop boundaries in social situations \par 3. Learn to identify and tolerate a wider range of emotions and develop language around the discussion of emotions. \par 4. Develop techniques to self regulate/self soothe in overwhelming circumstances, and how to learn to rely on others for co-regulation.  [FreeTextEntry4] : Pt arrived on time for session and participated throughout session. He was able to actively invite and empathize with another group member who had previously struggled to share. He was able to speak to feeling closer to that group member. He shared about the experience of witnessing his mother go through ECT treatment prior to her suicide. Left in good emotional/behavioral control.  [de-identified] : Plan: continue twice weekly therapy, medication management, group therapy.

## 2023-07-25 NOTE — REASON FOR VISIT
[Other:___] : due to [unfilled] [Continuing, patient seen in-person within last 12 months] : Telehealth services are continuing as patient has been seen in-person within last 12 months. [Telehealth (audio & video) - Individual/Group] : This visit was provided via telehealth using real-time 2-way audio visual technology. [Medical Office: ([facility])___] : The provider was located at the medical office in [unfilled]. [Home] : The patient, [unfilled], was located at home, [unfilled], at the time of the visit. [Verbal consent obtained from patient/other participant(s)] : Verbal consent for telehealth/telephonic services obtained from patient/other participant(s) [FreeTextEntry4] : 5pm [FreeTextEntry5] : 6pm

## 2023-07-26 ENCOUNTER — APPOINTMENT (OUTPATIENT)
Dept: PSYCHIATRY | Facility: CLINIC | Age: 58
End: 2023-07-26
Payer: MEDICAID

## 2023-07-26 PROCEDURE — 90834 PSYTX W PT 45 MINUTES: CPT | Mod: 95

## 2023-07-26 NOTE — PHYSICAL EXAM
[Well groomed] : well groomed [Average] : average [Cooperative] : cooperative [Anxious] : anxious [Full] : full [Rapid] : rapid [Linear/Goal Directed] : linear/goal directed [WNL] : within normal limits [FreeTextEntry8] : frustrated [de-identified] : tearful at times

## 2023-07-26 NOTE — PLAN
[FreeTextEntry2] : treatment will focus on supporting pt achieving his goals getting work, improving his mood, socializing more, decreasing perfectionism, and increasing motivation. will revisit treatment plan and duration in August. [Psychodynamic Therapy] : Psychodynamic Therapy  [Psychoeducation] : Psychoeducation  [de-identified] : Pt arrived on time for session. Session focused on processing past modern masculinities group, and pt's feelings of increased closeness toward other group members. Explored relational dynamics with writer in terms of pt's concerns he is overwhelming and exhausting to writer. Explored how this is indicative of various internal conflicts of the pt and also his self awareness that there can be a certain intensity to his experience and communication. Pt was cooperative and open to therapeutic inquiry. Session concluded with pt in good emotional control.  [Recommended Frequency of Visits: ____] : Recommended frequency of visits: [unfilled] [Return in ____ week(s)] : Return in [unfilled] week(s) [FreeTextEntry1] : continue twice weekly psychotherapy, medication management, modern masculinities group.

## 2023-07-31 ENCOUNTER — APPOINTMENT (OUTPATIENT)
Dept: PSYCHIATRY | Facility: CLINIC | Age: 58
End: 2023-07-31
Payer: MEDICAID

## 2023-07-31 PROCEDURE — 90834 PSYTX W PT 45 MINUTES: CPT | Mod: 59

## 2023-07-31 PROCEDURE — 90853 GROUP PSYCHOTHERAPY: CPT | Mod: 95

## 2023-08-02 ENCOUNTER — APPOINTMENT (OUTPATIENT)
Dept: PSYCHIATRY | Facility: CLINIC | Age: 58
End: 2023-08-02
Payer: MEDICAID

## 2023-08-02 DIAGNOSIS — Z86.59 PERSONAL HISTORY OF OTHER MENTAL AND BEHAVIORAL DISORDERS: ICD-10-CM

## 2023-08-02 PROCEDURE — 90834 PSYTX W PT 45 MINUTES: CPT | Mod: 95

## 2023-08-02 PROCEDURE — 99214 OFFICE O/P EST MOD 30 MIN: CPT | Mod: 95

## 2023-08-02 RX ORDER — LISINOPRIL 10 MG/1
10 TABLET ORAL DAILY
Refills: 0 | Status: ACTIVE | COMMUNITY
Start: 2023-08-02

## 2023-08-02 RX ORDER — AMLODIPINE BESYLATE 10 MG/1
10 TABLET ORAL
Refills: 0 | Status: ACTIVE | COMMUNITY
Start: 2023-08-02

## 2023-08-02 NOTE — REASON FOR VISIT
[Other:___] : due to [unfilled] [Continuing, patient seen in-person within last 12 months] : Telehealth services are continuing as patient has been seen in-person within last 12 months. [Telehealth (audio & video) - Individual/Group] : This visit was provided via telehealth using real-time 2-way audio visual technology. [Medical Office: ([facility])___] : The provider was located at the medical office in [unfilled]. [Home] : The patient, [unfilled], was located at home, [unfilled], at the time of the visit. [Verbal consent obtained from patient/other participant(s)] : Verbal consent for telehealth/telephonic services obtained from patient/other participant(s) [FreeTextEntry4] : 5pm [FreeTextEntry5] : 6pm

## 2023-08-02 NOTE — PLAN
[Yes. details: ___] : Yes, [unfilled] [Medication education provided] : Medication education provided. [Rationale for medication choices, possible risks/precautions, benefits, alternative treatment choices, and consequences of non-treatment discussed] : Rationale for medication choices, possible risks/precautions, benefits, alternative treatment choices, and consequences of non-treatment discussed with patient/family/caregiver  [FreeTextEntry5] : Offered support/encouragement, psychoeducation, counseling regarding: diagnosis/diagnoses, medications, symptoms, recommendations etc.  Reviewed/discussed plan below:  -Continue Bupropion XL at 450 mg po daily  -May take hydroxyzine 25 mg prn; not needing Ambien -May continue Lamictal to 100 mg a day for now  -Continue weekly therapy and writer may collaborate with pt's therapist prn -To continue Men's group, Mondays at 5 pm -Contact writer prn  30 minutes spent reviewing prior records/notes, seeing patient and recording session note

## 2023-08-02 NOTE — SOCIAL HISTORY
[FreeTextEntry1] : Pt has bachelor's degree in history, worked in beauty/cosmetic industry for 30 years, worked at [employer] but currently unemployed. Works as election poll worker seasonally. , living with . Born [place] raised [place], moved to [place], then [place], then [place], then [place], then [place] in 2009. \par  Denies any abuse history.\par

## 2023-08-02 NOTE — DISCUSSION/SUMMARY
[Date of Last Physical Exam: _____] : Date of Last Physical Exam: [unfilled] [Date of Last Annual Labs: _____] : Date of Last Annual Labs: [unfilled] [Annual Review of Systems Completed?] : Annual Review of Systems Completed: Yes [Tobacco Screening Completed?] : Tobacco Screening Completed: Yes [FreeTextEntry1] : Patient is a 57 year old white male, currently unemployed, former executive in cosmetics industry, domiciled with , with reported psychiatric history of bipolar disorder diagnosed in 2016, with no IPP admissions, initially presented to clinic to re-establish psychiatric care after having last seen psychiatrist in 2017 for medication management as well as therapy, presenting today for follow-up. On initial evaluation, patient had presented with symptoms suggestive of Generalized Anxiety Disorder, but did not appear acutely depressed, suicidal, manic, or psychotic. While patient reported history of Bipolar 1 Disorder, he did not report any lifetime manic episodes and patient's medication regimen (Lamictal 25 mg daily) would not likely have been effective in treatment of Bipolar 1 Disorder.   Even so, he was having frequent mood instability that improved with titration upwards of Lamictal.     Dx: Bipolar Spectrum Disorder  Patient reports some improvements in mood, motivation and sleep.

## 2023-08-02 NOTE — DISCUSSION/SUMMARY
[Once a week] : once a week [60 minutes] : 60 minutes [de-identified] : Modern Masculinities Group [FreeTextEntry8] : Number of patients in group: 6 1. Discuss/Explore issues regarding experiences arising from being socialized as male including the impact on relationships and vulnerability  2. Develop relational insight into pt's social impact on others and better understand patterns related to the impact of others on them. Learn/develop boundaries in social situations  3. Learn to identify and tolerate a wider range of emotions and develop language around the discussion of emotions.  4. Develop techniques to self regulate/self soothe in overwhelming circumstances, and how to learn to rely on others for co-regulation.  [FreeTextEntry4] : Pt arrived on time for session and participated throughout session. Pt was able to vocalize frustration with another pt who had missed a number of sessions, and explain his experience of feeling disrespected. He was able to communicate openly with other pt's during session and explore their impact on him as well as remain open to his impact on others. Left in good emotional/behavioral control.  [de-identified] : Plan: continue twice weekly therapy, medication management, group therapy.

## 2023-08-02 NOTE — SOCIAL HISTORY
[FreeTextEntry1] : Pt has bachelor's degree in history, worked in beauty/cosmetic industry for 30 years, worked at [employer] but currently unemployed. Works as election poll worker seasonally. , living with . Born [place] raised [place], moved to [place], then [place], then [place], then [place], then [place] in 2009. \par  Denies any abuse history.\par

## 2023-08-02 NOTE — HISTORY OF PRESENT ILLNESS
[No Known Substance Use] : no known substance use [No] : no [Not Applicable] : Not applicable [FreeTextEntry1] : Nilson reports that he overall he is doing well with mostly good days.  Spoke of group and individual therapy going well.  [FreeTextEntry2] : Dx with bipolar disorder in 2016, however no clear manic episode reported in lifetime. No IPP admissions in lifetime, saw 3 prior psychiatrists, last time was in 2017 - PMD continued prescribing psychiatric medications since then.  [FreeTextEntry3] : was first started on medications in 0716-5388: wellbutrin ER 300mg, lamotrigine 25 mg daily (started at 50mg, then brought down to 25mg), zolpidem 10mg PRN (1/2 of 1 PRN, says 30 days lasts 3 months. Denies any other medication trials.

## 2023-08-02 NOTE — PHYSICAL EXAM
[Well groomed] : well groomed [Average] : average [Cooperative] : cooperative [Anxious] : anxious [Full] : full [Rapid] : rapid [Linear/Goal Directed] : linear/goal directed [WNL] : within normal limits [FreeTextEntry8] : frustrated [de-identified] : tearful at times

## 2023-08-02 NOTE — HISTORY OF PRESENT ILLNESS
[No Known Substance Use] : no known substance use [No] : no [Not Applicable] : Not applicable [FreeTextEntry1] : Nilson reports that he overall he is doing well with mostly good days.  Spoke of group and individual therapy going well.  [FreeTextEntry2] : Dx with bipolar disorder in 2016, however no clear manic episode reported in lifetime. No IPP admissions in lifetime, saw 3 prior psychiatrists, last time was in 2017 - PMD continued prescribing psychiatric medications since then.  [FreeTextEntry3] : was first started on medications in 7211-8966: wellbutrin ER 300mg, lamotrigine 25 mg daily (started at 50mg, then brought down to 25mg), zolpidem 10mg PRN (1/2 of 1 PRN, says 30 days lasts 3 months. Denies any other medication trials.

## 2023-08-02 NOTE — CURRENT PSYCHIATRIC SYMPTOMS
[Depressed Mood] : no depressed mood [Euphoria] : no euphoria [Delusions] : no ~T delusions [de-identified] : some limited worry

## 2023-08-02 NOTE — CURRENT PSYCHIATRIC SYMPTOMS
[Depressed Mood] : no depressed mood [Euphoria] : no euphoria [Delusions] : no ~T delusions [de-identified] : some limited worry

## 2023-08-02 NOTE — PHYSICAL EXAM
[Well groomed] : well groomed [Average] : average [Cooperative] : cooperative [Anxious] : anxious [Full] : full [Rapid] : rapid [Linear/Goal Directed] : linear/goal directed [WNL] : within normal limits [FreeTextEntry8] : frustrated [de-identified] : tearful at times

## 2023-08-02 NOTE — PLAN
[FreeTextEntry2] : treatment will focus on supporting pt achieving his goals getting work, improving his mood, socializing more, decreasing perfectionism, and increasing motivation. will revisit treatment plan and duration in August. [Psychodynamic Therapy] : Psychodynamic Therapy  [Psychoeducation] : Psychoeducation  [de-identified] : Pt arrived on time for session. Session focused on exploring pt's avoidance of fixing his resume and making progress toward applying for jobs. Explored pt's propensity to use logic as a defense that enables this avoidance. Explore the value of interpersonal relationships as a component of accountability and how the role of shame prevents him from utilizing relationships in this way. Explored his tendency to exaggerate the "badness" of certain tasks or of his fear of failure in order to further avoid tasks he is afraid of. Pt was able to explore how it felt scary to share an internal deadline he had with margarettier and dyad explored whether we could check in about this deadline as it approaches.  Pt was cooperative and open to therapeutic inquiry. Session concluded with pt in good emotional control.  [Recommended Frequency of Visits: ____] : Recommended frequency of visits: [unfilled] [Return in ____ week(s)] : Return in [unfilled] week(s) [FreeTextEntry1] : continue twice weekly psychotherapy, medication management, modern masculinities group.

## 2023-08-07 ENCOUNTER — APPOINTMENT (OUTPATIENT)
Dept: PSYCHIATRY | Facility: CLINIC | Age: 58
End: 2023-08-07
Payer: MEDICAID

## 2023-08-07 PROCEDURE — 90853 GROUP PSYCHOTHERAPY: CPT | Mod: 95

## 2023-08-07 PROCEDURE — 90834 PSYTX W PT 45 MINUTES: CPT | Mod: 59

## 2023-08-07 NOTE — PLAN
[FreeTextEntry2] : treatment will focus on supporting pt achieving his goals getting work, improving his mood, socializing more, decreasing perfectionism, and increasing motivation. will revisit treatment plan and duration in August. [Psychodynamic Therapy] : Psychodynamic Therapy  [Psychoeducation] : Psychoeducation  [de-identified] : Pt arrived on time for session. Session focused on negotiating the task of therapy, as pt reported uncertainty about the value of his coming in with prepared topics, suggesting that perhaps it was less substantive. Discussed various ways the process of therapy sessions have unfolded as a result of this change in his approach. Explored how this change in approach also constitutes clinical progress, as pt now has a richer, more engaged life to discuss. Also processed the potential for "not going as deep" and agreed to continue to discuss these dynamics. Explored the process in vivo during session and were able to see the value of being able to connect in these ways during session. Pt was cooperative and open to therapeutic inquiry. Session concluded with pt in good emotional control.  [Recommended Frequency of Visits: ____] : Recommended frequency of visits: [unfilled] [Return in ____ week(s)] : Return in [unfilled] week(s) [FreeTextEntry1] : continue twice weekly psychotherapy, medication management, modern masculinities group.

## 2023-08-07 NOTE — PHYSICAL EXAM
[Well groomed] : well groomed [Average] : average [Cooperative] : cooperative [Anxious] : anxious [Full] : full [Rapid] : rapid [Linear/Goal Directed] : linear/goal directed [WNL] : within normal limits [FreeTextEntry8] : frustrated [de-identified] : tearful at times

## 2023-08-08 NOTE — DISCUSSION/SUMMARY
[Once a week] : once a week [60 minutes] : 60 minutes [de-identified] : Modern Masculinities Group [FreeTextEntry8] : Number of patients in group: 6 1. Discuss/Explore issues regarding experiences arising from being socialized as male including the impact on relationships and vulnerability  2. Develop relational insight into pt's social impact on others and better understand patterns related to the impact of others on them. Learn/develop boundaries in social situations  3. Learn to identify and tolerate a wider range of emotions and develop language around the discussion of emotions.  4. Develop techniques to self regulate/self soothe in overwhelming circumstances, and how to learn to rely on others for co-regulation.  [FreeTextEntry4] : Pt arrived on time for session and participated throughout session. Pt began group by checking in with another group member he felt had been left out as of late. He also shared about his own experience of mental health as a "journey without a destination." Left in good emotional/behavioral control.  [de-identified] : Plan: continue twice weekly therapy, medication management, group therapy.

## 2023-08-08 NOTE — REASON FOR VISIT
[Other:___] : due to [unfilled] [Continuing, patient seen in-person within last 12 months] : Telehealth services are continuing as patient has been seen in-person within last 12 months. [Telehealth (audio & video) - Individual/Group] : This visit was provided via telehealth using real-time 2-way audio visual technology. [Medical Office: ([facility])___] : The provider was located at the medical office in [unfilled]. [Home] : The patient, [unfilled], was located at home, [unfilled], at the time of the visit. [Verbal consent obtained from patient/other participant(s)] : Verbal consent for telehealth/telephonic services obtained from patient/other participant(s) [FreeTextEntry4] : 5pm [FreeTextEntry5] : 6pm

## 2023-08-09 ENCOUNTER — APPOINTMENT (OUTPATIENT)
Dept: PSYCHIATRY | Facility: CLINIC | Age: 58
End: 2023-08-09

## 2023-08-14 ENCOUNTER — APPOINTMENT (OUTPATIENT)
Dept: PSYCHIATRY | Facility: CLINIC | Age: 58
End: 2023-08-14
Payer: MEDICAID

## 2023-08-14 PROCEDURE — 90834 PSYTX W PT 45 MINUTES: CPT | Mod: 59

## 2023-08-14 PROCEDURE — 90853 GROUP PSYCHOTHERAPY: CPT | Mod: 95

## 2023-08-14 NOTE — PLAN
[FreeTextEntry2] : treatment will focus on supporting pt achieving his goals getting work, improving his mood, socializing more, decreasing perfectionism, and increasing motivation. will revisit treatment plan and duration in August. [Psychodynamic Therapy] : Psychodynamic Therapy  [Psychoeducation] : Psychoeducation  [de-identified] : Pt arrived on time for session. Session focused primarily on process pt's recent experience in group. Explored his experience of empathy in which he finds himself mirroring the emotions of others and feelings them very intensely himself. Explored this experience as counter to traditional notions of masculinity and his automatic compulsion to try to undo it, while also making space for pt to potentially see the value in his empathy. Explored how this comes up in group therapy, and how he can shift his attention to the process of empathy more than the content. Pt was cooperative and open to therapeutic inquiry. Session concluded with pt in good emotional control.  [Recommended Frequency of Visits: ____] : Recommended frequency of visits: [unfilled] [Return in ____ week(s)] : Return in [unfilled] week(s) [FreeTextEntry1] : continue twice weekly psychotherapy, medication management, modern masculinities group.

## 2023-08-14 NOTE — PHYSICAL EXAM
[Well groomed] : well groomed [Average] : average [Cooperative] : cooperative [Anxious] : anxious [Full] : full [Rapid] : rapid [Linear/Goal Directed] : linear/goal directed [WNL] : within normal limits [FreeTextEntry8] : frustrated [de-identified] : tearful at times

## 2023-08-15 NOTE — DISCUSSION/SUMMARY
[Once a week] : once a week [60 minutes] : 60 minutes [de-identified] : Modern Masculinities Group [FreeTextEntry8] : Number of patients in group: 5 1. Discuss/Explore issues regarding experiences arising from being socialized as male including the impact on relationships and vulnerability  2. Develop relational insight into pt's social impact on others and better understand patterns related to the impact of others on them. Learn/develop boundaries in social situations  3. Learn to identify and tolerate a wider range of emotions and develop language around the discussion of emotions.  4. Develop techniques to self regulate/self soothe in overwhelming circumstances, and how to learn to rely on others for co-regulation.  [FreeTextEntry4] : Pt arrived on time for session and participated throughout session. Pt shared about his own parents divorce, and how difficult that was for his mother. He was able to process his own feelings of anger toward other group members and explain that it comes from a place of the group holding great importance for him, and a desire to get closer with others in the group. Left in good emotional/behavioral control.  [de-identified] : Plan: continue twice weekly therapy, medication management, group therapy.

## 2023-08-15 NOTE — REASON FOR VISIT
[Other:___] : due to [unfilled] [Continuing, patient seen in-person within last 12 months] : Telehealth services are continuing as patient has been seen in-person within last 12 months. [Telehealth (audio & video) - Individual/Group] : This visit was provided via telehealth using real-time 2-way audio visual technology. [Medical Office: ([facility])___] : The provider was located at the medical office in [unfilled]. [Home] : The patient, [unfilled], was located at home, [unfilled], at the time of the visit. [Verbal consent obtained from patient/other participant(s)] : Verbal consent for telehealth/telephonic services obtained from patient/other participant(s) [FreeTextEntry4] : 5pm [FreeTextEntry5] : 6pm

## 2023-08-16 ENCOUNTER — APPOINTMENT (OUTPATIENT)
Dept: PSYCHIATRY | Facility: CLINIC | Age: 58
End: 2023-08-16
Payer: MEDICAID

## 2023-08-16 DIAGNOSIS — I10 ESSENTIAL (PRIMARY) HYPERTENSION: ICD-10-CM

## 2023-08-16 PROCEDURE — 90834 PSYTX W PT 45 MINUTES: CPT | Mod: 95

## 2023-08-16 RX ORDER — HYDROXYZINE HYDROCHLORIDE 25 MG/1
25 TABLET ORAL AT BEDTIME
Qty: 30 | Refills: 0 | Status: ACTIVE | COMMUNITY
Start: 2023-01-26 | End: 1900-01-01
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
## 2023-08-16 NOTE — RISK ASSESSMENT
[No] : No [FreeTextEntry8] : pt denied thoughts of suicide [FreeTextEntry7] : no evidence of current violent or homicidal ideation
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
## 2023-08-16 NOTE — DISCUSSION/SUMMARY
[Initial Plan] : Initial Plan [Able to manage surrounding demands and opportunities] : able to manage surrounding demands and opportunities [Able to exercise self-direction] : able to exercise self-direction [Able to set and pursue goals] : able to set and pursue goals [Adherent to treatment recommendations] : adherent to treatment recommendations [Articulate] : articulate [Attempting to realize their potential] : Attempting to realize their potential [Cognitively intact] : cognitively intact [Good impulse control] : good impulse control [Insightful] : insightful [Intelligent] : intelligent [Motivated to participate in treatment] : motivated to participate in treatment [Health literacy] : health literacy [Motivated to maintain or improve physical health] : motivated to maintain or improve physical health [In good health] : in good health [Financially stable] : financially stable [Has vocational interests or hobbies] : has vocational interests or hobbies [Part of a supportive marriage] : part of a supportive marriage [Part of a supportive family] : part of a supportive family [Housing stability] : housing stability [Civic organizations] : civic organizations [High level of education] : high level of education [English fluency] : English fluency [Connected to healthcare] : connected to healthcare [Access to safe outdoor spaces] : access to safe outdoor spaces [Physical Health] : Physical Health [Occupational/Educational] : Occupational/Educational [Interpersonal Relationships] : Interpersonal Relationships [Continued - Progress made] : Continued - Progress made: [every ___ months] : every [unfilled] months [Mental Health] : Mental Health [Initial] : Initial [every ___ weeks] : every [unfilled] weeks [___ times a week] : [unfilled] times a week [A change in level of care is needed as evidenced by:] : A change in level of care is needed as evidenced by: [None - Reason others did not participate:] : None - Reason others did not participate:  [Yes] : Yes [Psychiatric Provider/Prescriber] : Psychiatric Provider/Prescriber [Therapist] : Therapist [FreeTextEntry2] : 2/1/24 [FreeTextEntry3] : 10/25/22 [FreeTextEntry9] : 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 [facility] [employer] [de-identified] : Establish an inward sense of self-worth, confidence, and competence [de-identified] : 8/1/23 [FreeTextEntry1] : Perfectionism [FreeTextEntry4] : need to get better at accepting messiness and imperfection [de-identified] : achieve an overall decrease in pressured, driven, behaviors [de-identified] : 8/1/23 [FreeTextEntry5] : insight oriented therapy.  [de-identified] : complete symptomatic remission with no adjustment in medications over 1 year

## 2023-08-16 NOTE — PHYSICAL EXAM
[6] : 6 [5] : 5 [3] : 3 [4] : 4 [2 - 2  to 4 times a month] : 2 - 2  to 4 times a month [0 - 1 or 2] : 0 - 1 or 2 [0 - Never] : 0 - Never [0 - No] : 0 - No [] : No [FreeTextEntry1] : 2 [SchwartzScore] : 44 [Well groomed] : well groomed [Average] : average [Cooperative] : cooperative [Anxious] : anxious [Full] : full [Rapid] : rapid [Linear/Goal Directed] : linear/goal directed [WNL] : within normal limits [FreeTextEntry8] : frustrated [de-identified] : tearful at times

## 2023-08-16 NOTE — DISCUSSION/SUMMARY
[Initial Plan] : Initial Plan [Able to manage surrounding demands and opportunities] : able to manage surrounding demands and opportunities [Able to exercise self-direction] : able to exercise self-direction [Able to set and pursue goals] : able to set and pursue goals [Adherent to treatment recommendations] : adherent to treatment recommendations [Articulate] : articulate [Attempting to realize their potential] : Attempting to realize their potential [Cognitively intact] : cognitively intact [Good impulse control] : good impulse control [Insightful] : insightful [Intelligent] : intelligent [Motivated to participate in treatment] : motivated to participate in treatment [Health literacy] : health literacy [Motivated to maintain or improve physical health] : motivated to maintain or improve physical health [In good health] : in good health [Financially stable] : financially stable [Has vocational interests or hobbies] : has vocational interests or hobbies [Part of a supportive marriage] : part of a supportive marriage [Part of a supportive family] : part of a supportive family [Housing stability] : housing stability [Civic organizations] : civic organizations [High level of education] : high level of education [English fluency] : English fluency [Connected to healthcare] : connected to healthcare [Access to safe outdoor spaces] : access to safe outdoor spaces [Physical Health] : Physical Health [Occupational/Educational] : Occupational/Educational [Interpersonal Relationships] : Interpersonal Relationships [Continued - Progress made] : Continued - Progress made: [every ___ months] : every [unfilled] months [Mental Health] : Mental Health [Initial] : Initial [every ___ weeks] : every [unfilled] weeks [___ times a week] : [unfilled] times a week [A change in level of care is needed as evidenced by:] : A change in level of care is needed as evidenced by: [None - Reason others did not participate:] : None - Reason others did not participate:  [Yes] : Yes [Psychiatric Provider/Prescriber] : Psychiatric Provider/Prescriber [Therapist] : Therapist [FreeTextEntry2] : 2/1/24 [FreeTextEntry3] : 10/25/22 [FreeTextEntry9] : 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  [de-identified] : complete symptomatic remission with no adjustment in medications over 1 year

## 2023-08-16 NOTE — REASON FOR VISIT
[Patient preference] : as per patient preference [Continuing, patient seen in-person within last 12 months] : Telehealth services are continuing as patient has been seen in-person within last 12 months. [Telehealth (audio & video) - Individual/Group] : This visit was provided via telehealth using real-time 2-way audio visual technology. [Other Location: e.g. Home (Enter Location, City,State)___] : The provider was located at [unfilled]. [Home] : The patient, [unfilled], was located at home, [unfilled], at the time of the visit. [Verbal consent obtained from patient/other participant(s)] : Verbal consent for telehealth/telephonic services obtained from patient/other participant(s) [FreeTextEntry4] : 3pm [FreeTextEntry5] : 345pm [FreeTextEntry2] : 8/14/23 [Patient] : Patient [FreeTextEntry1] : Pt requests psychotherapy to focus on anhedonia, low motivation, feelings of "stuckness", processing his father's recent death, feelings of humiliation and guilt, perfectionism

## 2023-08-16 NOTE — PHYSICAL EXAM
[6] : 6 [5] : 5 [3] : 3 [4] : 4 [2 - 2  to 4 times a month] : 2 - 2  to 4 times a month [0 - 1 or 2] : 0 - 1 or 2 [0 - Never] : 0 - Never [0 - No] : 0 - No [] : No [FreeTextEntry1] : 2 [SchwartzScore] : 44 [Well groomed] : well groomed [Average] : average [Cooperative] : cooperative [Anxious] : anxious [Full] : full [Rapid] : rapid [Linear/Goal Directed] : linear/goal directed [WNL] : within normal limits [FreeTextEntry8] : frustrated [de-identified] : tearful at times

## 2023-08-21 ENCOUNTER — APPOINTMENT (OUTPATIENT)
Dept: PSYCHIATRY | Facility: CLINIC | Age: 58
End: 2023-08-21
Payer: MEDICAID

## 2023-08-21 PROCEDURE — 90853 GROUP PSYCHOTHERAPY: CPT | Mod: 95

## 2023-08-21 PROCEDURE — 90834 PSYTX W PT 45 MINUTES: CPT | Mod: 59

## 2023-08-21 NOTE — PLAN
[FreeTextEntry2] : treatment will focus on supporting pt achieving his goals getting work, improving his mood, socializing more, decreasing perfectionism, and increasing motivation. will revisit treatment plan and duration in August. [Psychodynamic Therapy] : Psychodynamic Therapy  [Psychoeducation] : Psychoeducation  [de-identified] : Pt arrived on time for session. Session focused on a recent conflict pt had with his partner. Supported pt in mentalizing his partner's experience, as well as mentalizing his partner's experience of him. Supported him in reflecting on how they communicate. Provided psychoeducation with referral for Hold Me Tight by Virginie Lujan. Supported pt in reflecting on the ways in which, like his partner, he can also be quite demanding at times. Pt was cooperative and open to therapeutic inquiry. Session concluded with pt in good emotional control.  [Recommended Frequency of Visits: ____] : Recommended frequency of visits: [unfilled] [Return in ____ week(s)] : Return in [unfilled] week(s) [FreeTextEntry1] : continue twice weekly psychotherapy, medication management, modern masculinities group.

## 2023-08-21 NOTE — PHYSICAL EXAM
[Well groomed] : well groomed [Average] : average [Cooperative] : cooperative [Anxious] : anxious [Full] : full [Rapid] : rapid [Linear/Goal Directed] : linear/goal directed [WNL] : within normal limits [FreeTextEntry8] : frustrated [de-identified] : tearful at times

## 2023-08-21 NOTE — REASON FOR VISIT
[Other:___] : due to [unfilled] [Continuing, patient seen in-person within last 12 months] : Telehealth services are continuing as patient has been seen in-person within last 12 months. [Telehealth (audio & video) - Individual/Group] : This visit was provided via telehealth using real-time 2-way audio visual technology. [Medical Office: ([facility])___] : The provider was located at the medical office in [unfilled]. [Home] : The patient, [unfilled], was located at home, [unfilled], at the time of the visit. [Verbal consent obtained from patient/other participant(s)] : Verbal consent for telehealth/telephonic services obtained from patient/other participant(s) [FreeTextEntry4] : 5pm [FreeTextEntry5] : 6pm

## 2023-08-21 NOTE — DISCUSSION/SUMMARY
[Once a week] : once a week [60 minutes] : 60 minutes [de-identified] : Modern Masculinities Group [FreeTextEntry8] : Number of patients in group: 5 1. Discuss/Explore issues regarding experiences arising from being socialized as male including the impact on relationships and vulnerability  2. Develop relational insight into pt's social impact on others and better understand patterns related to the impact of others on them. Learn/develop boundaries in social situations  3. Learn to identify and tolerate a wider range of emotions and develop language around the discussion of emotions.  4. Develop techniques to self regulate/self soothe in overwhelming circumstances, and how to learn to rely on others for co-regulation.  [FreeTextEntry4] : Pt arrived on time for session and participated throughout session. He was curious about another group member's absence and shared that he had been concerned. He asked questions of another group member who hadn't shared much recently and spoke about his reflections on the group's avoidance of conflict. Left in good emotional/behavioral control.  [de-identified] : Plan: continue twice weekly therapy, medication management, group therapy.

## 2023-08-23 ENCOUNTER — APPOINTMENT (OUTPATIENT)
Dept: PSYCHIATRY | Facility: CLINIC | Age: 58
End: 2023-08-23
Payer: MEDICAID

## 2023-08-23 PROCEDURE — 90834 PSYTX W PT 45 MINUTES: CPT | Mod: 95

## 2023-08-23 NOTE — PLAN
[FreeTextEntry2] : treatment will focus on supporting pt achieving his goals getting work, improving his mood, socializing more, decreasing perfectionism, and increasing motivation. will revisit treatment plan and duration in August. [Psychodynamic Therapy] : Psychodynamic Therapy  [Psychoeducation] : Psychoeducation  [de-identified] : Pt arrived on time for session. Session focused on processing recent group session, and pt's conflict around having control. He explored his feelings of guilt for asking another pt a question when there was insufficient time to answer. Explored pt's relationship with his partner and a new friend. Pt was cooperative and open to therapeutic inquiry. Session concluded with pt in good emotional control.  [Recommended Frequency of Visits: ____] : Recommended frequency of visits: [unfilled] [Return in ____ week(s)] : Return in [unfilled] week(s) [FreeTextEntry1] : continue twice weekly psychotherapy, medication management, modern masculinities group.

## 2023-08-23 NOTE — PHYSICAL EXAM
[Well groomed] : well groomed [Average] : average [Cooperative] : cooperative [Anxious] : anxious [Full] : full [Rapid] : rapid [Linear/Goal Directed] : linear/goal directed [WNL] : within normal limits [FreeTextEntry8] : frustrated [de-identified] : tearful at times

## 2023-08-28 ENCOUNTER — APPOINTMENT (OUTPATIENT)
Dept: PSYCHIATRY | Facility: CLINIC | Age: 58
End: 2023-08-28

## 2023-09-01 ENCOUNTER — OUTPATIENT (OUTPATIENT)
Dept: OUTPATIENT SERVICES | Facility: HOSPITAL | Age: 58
LOS: 1 days | Discharge: HOME | End: 2023-09-01

## 2023-09-06 ENCOUNTER — APPOINTMENT (OUTPATIENT)
Dept: PSYCHIATRY | Facility: CLINIC | Age: 58
End: 2023-09-06
Payer: MEDICAID

## 2023-09-06 ENCOUNTER — NON-APPOINTMENT (OUTPATIENT)
Age: 58
End: 2023-09-06

## 2023-09-06 PROCEDURE — 90853 GROUP PSYCHOTHERAPY: CPT | Mod: 95

## 2023-09-06 NOTE — PHYSICAL EXAM
[Well groomed] : well groomed [Average] : average [Cooperative] : cooperative [Anxious] : anxious [Full] : full [Rapid] : rapid [Linear/Goal Directed] : linear/goal directed [WNL] : within normal limits [FreeTextEntry8] : frustrated [de-identified] : tearful at times

## 2023-09-06 NOTE — PLAN
[FreeTextEntry2] : treatment will focus on supporting pt achieving his goals getting work, improving his mood, socializing more, decreasing perfectionism, and increasing motivation. will revisit treatment plan and duration in August. [Psychodynamic Therapy] : Psychodynamic Therapy  [Psychoeducation] : Psychoeducation  [de-identified] : Pt arrived on time for session. Session focused on processing pt's feelings of missing group, his fantasies of what he'd like to say to them. Discussed pt's pride at his improved exercise routine and efforts towad losing weight, which are central goals of his. Also discussed progress toward his resume and hopes of applying to a job he found. Explored improvements in his relationship with his  in terms of quality time and intimacy. Pt was cooperative and open to therapeutic inquiry. Session concluded with pt in good emotional control.  [Recommended Frequency of Visits: ____] : Recommended frequency of visits: [unfilled] [Return in ____ week(s)] : Return in [unfilled] week(s) [FreeTextEntry1] : continue twice weekly psychotherapy, medication management, modern masculinities group.

## 2023-09-11 ENCOUNTER — APPOINTMENT (OUTPATIENT)
Dept: PSYCHIATRY | Facility: CLINIC | Age: 58
End: 2023-09-11
Payer: MEDICAID

## 2023-09-11 PROCEDURE — 90853 GROUP PSYCHOTHERAPY: CPT | Mod: 95

## 2023-09-11 PROCEDURE — 99213 OFFICE O/P EST LOW 20 MIN: CPT | Mod: 25,95

## 2023-09-11 PROCEDURE — 90836 PSYTX W PT W E/M 45 MIN: CPT | Mod: 59

## 2023-09-13 ENCOUNTER — APPOINTMENT (OUTPATIENT)
Dept: PSYCHIATRY | Facility: CLINIC | Age: 58
End: 2023-09-13
Payer: MEDICAID

## 2023-09-13 PROCEDURE — 90834 PSYTX W PT 45 MINUTES: CPT | Mod: 95

## 2023-09-18 ENCOUNTER — APPOINTMENT (OUTPATIENT)
Dept: PSYCHIATRY | Facility: CLINIC | Age: 58
End: 2023-09-18
Payer: MEDICAID

## 2023-09-18 PROCEDURE — 90834 PSYTX W PT 45 MINUTES: CPT | Mod: 59

## 2023-09-18 PROCEDURE — 90853 GROUP PSYCHOTHERAPY: CPT | Mod: 95

## 2023-09-20 ENCOUNTER — APPOINTMENT (OUTPATIENT)
Dept: PSYCHIATRY | Facility: CLINIC | Age: 58
End: 2023-09-20
Payer: MEDICAID

## 2023-09-20 PROCEDURE — 90834 PSYTX W PT 45 MINUTES: CPT | Mod: 95

## 2023-09-25 ENCOUNTER — APPOINTMENT (OUTPATIENT)
Dept: PSYCHIATRY | Facility: CLINIC | Age: 58
End: 2023-09-25
Payer: MEDICAID

## 2023-09-25 PROCEDURE — 90853 GROUP PSYCHOTHERAPY: CPT | Mod: 95

## 2023-09-25 PROCEDURE — 90834 PSYTX W PT 45 MINUTES: CPT | Mod: 59

## 2023-09-27 ENCOUNTER — APPOINTMENT (OUTPATIENT)
Dept: PSYCHIATRY | Facility: CLINIC | Age: 58
End: 2023-09-27
Payer: MEDICAID

## 2023-09-27 PROCEDURE — 90834 PSYTX W PT 45 MINUTES: CPT

## 2023-10-02 ENCOUNTER — APPOINTMENT (OUTPATIENT)
Dept: PSYCHIATRY | Facility: CLINIC | Age: 58
End: 2023-10-02
Payer: MEDICAID

## 2023-10-02 PROCEDURE — 90834 PSYTX W PT 45 MINUTES: CPT

## 2023-10-02 PROCEDURE — 90834 PSYTX W PT 45 MINUTES: CPT | Mod: 59

## 2023-10-02 PROCEDURE — 90853 GROUP PSYCHOTHERAPY: CPT | Mod: 95

## 2023-10-04 ENCOUNTER — APPOINTMENT (OUTPATIENT)
Dept: PSYCHIATRY | Facility: CLINIC | Age: 58
End: 2023-10-04

## 2023-10-09 ENCOUNTER — APPOINTMENT (OUTPATIENT)
Dept: PSYCHIATRY | Facility: CLINIC | Age: 58
End: 2023-10-09
Payer: MEDICAID

## 2023-10-09 DIAGNOSIS — F32.9 MAJOR DEPRESSIVE DISORDER, SINGLE EPISODE, UNSPECIFIED: ICD-10-CM

## 2023-10-09 PROCEDURE — 90834 PSYTX W PT 45 MINUTES: CPT | Mod: 59

## 2023-10-09 PROCEDURE — 90853 GROUP PSYCHOTHERAPY: CPT | Mod: 95

## 2023-10-10 DIAGNOSIS — F41.9 ANXIETY DISORDER, UNSPECIFIED: ICD-10-CM

## 2023-10-10 PROBLEM — F32.9 MAJOR DEPRESSION: Status: ACTIVE | Noted: 2023-08-02

## 2023-10-11 ENCOUNTER — APPOINTMENT (OUTPATIENT)
Dept: PSYCHIATRY | Facility: CLINIC | Age: 58
End: 2023-10-11
Payer: MEDICAID

## 2023-10-11 PROCEDURE — 90834 PSYTX W PT 45 MINUTES: CPT | Mod: 95

## 2023-10-11 PROCEDURE — 99214 OFFICE O/P EST MOD 30 MIN: CPT | Mod: 95

## 2023-10-16 ENCOUNTER — APPOINTMENT (OUTPATIENT)
Dept: PSYCHIATRY | Facility: CLINIC | Age: 58
End: 2023-10-16
Payer: MEDICAID

## 2023-10-16 PROCEDURE — 90853 GROUP PSYCHOTHERAPY: CPT

## 2023-10-16 PROCEDURE — 90834 PSYTX W PT 45 MINUTES: CPT | Mod: 59

## 2023-10-18 ENCOUNTER — APPOINTMENT (OUTPATIENT)
Dept: PSYCHIATRY | Facility: CLINIC | Age: 58
End: 2023-10-18
Payer: MEDICAID

## 2023-10-18 PROCEDURE — 90834 PSYTX W PT 45 MINUTES: CPT | Mod: 95

## 2023-10-23 ENCOUNTER — APPOINTMENT (OUTPATIENT)
Dept: PSYCHIATRY | Facility: CLINIC | Age: 58
End: 2023-10-23
Payer: MEDICAID

## 2023-10-23 PROCEDURE — 90834 PSYTX W PT 45 MINUTES: CPT

## 2023-10-23 PROCEDURE — 90853 GROUP PSYCHOTHERAPY: CPT | Mod: GT

## 2023-10-25 ENCOUNTER — APPOINTMENT (OUTPATIENT)
Dept: PSYCHIATRY | Facility: CLINIC | Age: 58
End: 2023-10-25
Payer: MEDICAID

## 2023-10-25 PROCEDURE — 90834 PSYTX W PT 45 MINUTES: CPT | Mod: 95

## 2023-10-30 ENCOUNTER — APPOINTMENT (OUTPATIENT)
Dept: PSYCHIATRY | Facility: CLINIC | Age: 58
End: 2023-10-30

## 2023-11-06 ENCOUNTER — APPOINTMENT (OUTPATIENT)
Dept: PSYCHIATRY | Facility: CLINIC | Age: 58
End: 2023-11-06
Payer: MEDICAID

## 2023-11-06 ENCOUNTER — NON-APPOINTMENT (OUTPATIENT)
Age: 58
End: 2023-11-06

## 2023-11-06 PROCEDURE — 90853 GROUP PSYCHOTHERAPY: CPT | Mod: 95

## 2023-11-06 PROCEDURE — 90834 PSYTX W PT 45 MINUTES: CPT

## 2023-11-08 ENCOUNTER — APPOINTMENT (OUTPATIENT)
Dept: PSYCHIATRY | Facility: CLINIC | Age: 58
End: 2023-11-08
Payer: MEDICAID

## 2023-11-08 PROCEDURE — 90834 PSYTX W PT 45 MINUTES: CPT | Mod: 95

## 2023-11-13 ENCOUNTER — APPOINTMENT (OUTPATIENT)
Dept: PSYCHIATRY | Facility: CLINIC | Age: 58
End: 2023-11-13
Payer: MEDICAID

## 2023-11-13 PROCEDURE — 99214 OFFICE O/P EST MOD 30 MIN: CPT | Mod: 95

## 2023-11-13 PROCEDURE — 90853 GROUP PSYCHOTHERAPY: CPT | Mod: GT

## 2023-11-13 PROCEDURE — 90834 PSYTX W PT 45 MINUTES: CPT | Mod: 59

## 2023-11-15 ENCOUNTER — APPOINTMENT (OUTPATIENT)
Dept: PSYCHIATRY | Facility: CLINIC | Age: 58
End: 2023-11-15
Payer: MEDICAID

## 2023-11-15 PROCEDURE — 90834 PSYTX W PT 45 MINUTES: CPT | Mod: 95

## 2023-11-20 ENCOUNTER — APPOINTMENT (OUTPATIENT)
Dept: PSYCHIATRY | Facility: CLINIC | Age: 58
End: 2023-11-20
Payer: MEDICAID

## 2023-11-20 PROCEDURE — 90853 GROUP PSYCHOTHERAPY: CPT | Mod: GT

## 2023-11-20 PROCEDURE — 90834 PSYTX W PT 45 MINUTES: CPT | Mod: 59

## 2023-11-22 ENCOUNTER — APPOINTMENT (OUTPATIENT)
Dept: PSYCHIATRY | Facility: CLINIC | Age: 58
End: 2023-11-22
Payer: MEDICAID

## 2023-11-22 PROCEDURE — 90834 PSYTX W PT 45 MINUTES: CPT | Mod: 95

## 2023-11-27 ENCOUNTER — APPOINTMENT (OUTPATIENT)
Dept: PSYCHIATRY | Facility: CLINIC | Age: 58
End: 2023-11-27
Payer: MEDICAID

## 2023-11-27 PROCEDURE — 90853 GROUP PSYCHOTHERAPY: CPT | Mod: 95

## 2023-11-27 PROCEDURE — 90834 PSYTX W PT 45 MINUTES: CPT | Mod: 59

## 2023-11-29 ENCOUNTER — APPOINTMENT (OUTPATIENT)
Dept: PSYCHIATRY | Facility: CLINIC | Age: 58
End: 2023-11-29
Payer: MEDICAID

## 2023-11-29 PROCEDURE — 90834 PSYTX W PT 45 MINUTES: CPT | Mod: 95

## 2023-12-04 ENCOUNTER — APPOINTMENT (OUTPATIENT)
Dept: PSYCHIATRY | Facility: CLINIC | Age: 58
End: 2023-12-04
Payer: MEDICAID

## 2023-12-04 PROCEDURE — 90853 GROUP PSYCHOTHERAPY: CPT

## 2023-12-04 PROCEDURE — 90834 PSYTX W PT 45 MINUTES: CPT | Mod: 59

## 2023-12-06 ENCOUNTER — APPOINTMENT (OUTPATIENT)
Dept: PSYCHIATRY | Facility: CLINIC | Age: 58
End: 2023-12-06
Payer: MEDICAID

## 2023-12-06 PROCEDURE — 90834 PSYTX W PT 45 MINUTES: CPT | Mod: 95

## 2023-12-11 ENCOUNTER — APPOINTMENT (OUTPATIENT)
Dept: PSYCHIATRY | Facility: CLINIC | Age: 58
End: 2023-12-11
Payer: MEDICAID

## 2023-12-11 VITALS — WEIGHT: 218 LBS | HEIGHT: 71 IN | BODY MASS INDEX: 30.52 KG/M2

## 2023-12-11 PROCEDURE — 90853 GROUP PSYCHOTHERAPY: CPT | Mod: GT

## 2023-12-11 PROCEDURE — 90834 PSYTX W PT 45 MINUTES: CPT | Mod: 59

## 2023-12-11 PROCEDURE — 99215 OFFICE O/P EST HI 40 MIN: CPT | Mod: 95

## 2023-12-13 ENCOUNTER — APPOINTMENT (OUTPATIENT)
Dept: PSYCHIATRY | Facility: CLINIC | Age: 58
End: 2023-12-13
Payer: MEDICAID

## 2023-12-13 PROCEDURE — 90834 PSYTX W PT 45 MINUTES: CPT | Mod: 95

## 2023-12-13 NOTE — REASON FOR VISIT
[Continuing, patient seen in-person within last 12 months] : Telehealth services are continuing as patient has been seen in-person within last 12 months. [Telehealth (audio & video) - Individual/Group] : This visit was provided via telehealth using real-time 2-way audio visual technology. [Other Location: e.g. Home (Enter Location, City,State)___] : The provider was located at [unfilled]. [Home] : The patient, [unfilled], was located at home, [unfilled], at the time of the visit. [Verbal consent obtained from patient/other participant(s)] : Verbal consent for telehealth/telephonic services obtained from patient/other participant(s) [FreeTextEntry4] : 3pm [FreeTextEntry5] : 345pm [FreeTextEntry2] : 11/13/23 [Patient] : Patient [FreeTextEntry1] : Pt requests psychotherapy to focus on anhedonia, low motivation, feelings of "stuckness", processing his father's recent death, feelings of humiliation and guilt, perfectionism

## 2023-12-13 NOTE — PHYSICAL EXAM
[Well groomed] : well groomed [Average] : average [Cooperative] : cooperative [Anxious] : anxious [Full] : full [Rapid] : rapid [Linear/Goal Directed] : linear/goal directed [WNL] : within normal limits [FreeTextEntry8] : frustrated [de-identified] : tearful at times

## 2023-12-13 NOTE — PLAN
[FreeTextEntry2] : treatment will focus on supporting pt achieving his goals getting work, improving his mood, socializing more, decreasing perfectionism, and increasing motivation. will revisit treatment plan and duration in August. [Psychodynamic Therapy] : Psychodynamic Therapy  [Psychoeducation] : Psychoeducation  [de-identified] : Pt arrived on time for session. Session focused on exploring pt's anxiety about an upcoming job interview, and the extent to which he has been ruminating and obsessing about how to do it perfectly. Explored the vulnerability of wanting the job so badly and of putting forth so much effort to get it. Explored pt's difficulty sleeping and encouraged pt to make sure to exercise in the next few days, as it would likely help him sleep better. Also explored the ways in which pt is unintentionally exacerbating his own anxiety through attempting to prepare for things that are out of his control. Pt was cooperative and open to therapeutic inquiry. Session concluded with pt in good emotional control.  [Recommended Frequency of Visits: ____] : Recommended frequency of visits: [unfilled] [Return in ____ week(s)] : Return in [unfilled] week(s) [FreeTextEntry1] : continue twice weekly psychotherapy, medication management, modern masculinities group.

## 2023-12-18 ENCOUNTER — APPOINTMENT (OUTPATIENT)
Dept: PSYCHIATRY | Facility: CLINIC | Age: 58
End: 2023-12-18
Payer: MEDICAID

## 2023-12-18 PROCEDURE — 90853 GROUP PSYCHOTHERAPY: CPT | Mod: GT

## 2023-12-18 PROCEDURE — 90834 PSYTX W PT 45 MINUTES: CPT | Mod: 59

## 2023-12-18 NOTE — PHYSICAL EXAM
[Well groomed] : well groomed [Average] : average [Cooperative] : cooperative [Anxious] : anxious [Full] : full [Rapid] : rapid [Linear/Goal Directed] : linear/goal directed [WNL] : within normal limits [FreeTextEntry8] : frustrated [de-identified] : tearful at times

## 2023-12-18 NOTE — PLAN
[FreeTextEntry2] : treatment will focus on supporting pt achieving his goals getting work, improving his mood, socializing more, decreasing perfectionism, and increasing motivation. will revisit treatment plan and duration in August. [Psychodynamic Therapy] : Psychodynamic Therapy  [Psychoeducation] : Psychoeducation  [de-identified] : Pt arrived on time for session. Session focused on processing pt's recent job interview, and his experience of overwhelm. Explored his pride in his preparation and performance during the interview but also the ways in which he doubts himself and found fault in his interview. Explored pt's ability to get his relational needs meet for self-regulation through use of social support with friends and his . Pt was cooperative and open to therapeutic inquiry. Session concluded with pt in good emotional control.  [Recommended Frequency of Visits: ____] : Recommended frequency of visits: [unfilled] [Return in ____ week(s)] : Return in [unfilled] week(s) [FreeTextEntry1] : continue twice weekly psychotherapy, medication management, modern masculinities group.

## 2023-12-20 ENCOUNTER — APPOINTMENT (OUTPATIENT)
Dept: PSYCHIATRY | Facility: CLINIC | Age: 58
End: 2023-12-20
Payer: MEDICAID

## 2023-12-20 PROCEDURE — 90834 PSYTX W PT 45 MINUTES: CPT | Mod: 95

## 2023-12-20 NOTE — REASON FOR VISIT
[Other:___] : due to [unfilled] [Continuing, patient seen in-person within last 12 months] : Telehealth services are continuing as patient has been seen in-person within last 12 months. [Telehealth (audio & video) - Individual/Group] : This visit was provided via telehealth using real-time 2-way audio visual technology. [Medical Office: ([facility])___] : The provider was located at the medical office in [unfilled]. [Home] : The patient, [unfilled], was located at home, [unfilled], at the time of the visit. [Verbal consent obtained from patient/other participant(s)] : Verbal consent for telehealth/telephonic services obtained from patient/other participant(s) [FreeTextEntry4] : 5pm [FreeTextEntry5] : 6pm

## 2023-12-20 NOTE — PHYSICAL EXAM
[Well groomed] : well groomed [Average] : average [Cooperative] : cooperative [Anxious] : anxious [Full] : full [Rapid] : rapid [Linear/Goal Directed] : linear/goal directed [WNL] : within normal limits [FreeTextEntry8] : frustrated [de-identified] : tearful at times

## 2023-12-20 NOTE — DISCUSSION/SUMMARY
[Once a week] : once a week [60 minutes] : 60 minutes [de-identified] : Modern Masculinities Group [FreeTextEntry8] : Number of patients in group: 5 1. Discuss/Explore issues regarding experiences arising from being socialized as male including the impact on relationships and vulnerability  2. Develop relational insight into pt's social impact on others and better understand patterns related to the impact of others on them. Learn/develop boundaries in social situations  3. Learn to identify and tolerate a wider range of emotions and develop language around the discussion of emotions.  4. Develop techniques to self regulate/self soothe in overwhelming circumstances, and how to learn to rely on others for co-regulation.  [FreeTextEntry4] : Pt arrived on time for session and participated throughout session. Pt was vulnerable and forthcoming about having recently been told that he was not selected for a job he had interviewed for and was very excited about. He shared feelings of disappointment, sadness, frustration, inadequacy and was able to accept the support of other group members well, and express that. Left in good emotional/behavioral control.  [de-identified] : Plan: continue twice weekly therapy, medication management, group therapy.

## 2023-12-20 NOTE — PLAN
[FreeTextEntry2] : treatment will focus on supporting pt achieving his goals getting work, improving his mood, socializing more, decreasing perfectionism, and increasing motivation. will revisit treatment plan and duration in August. [Psychodynamic Therapy] : Psychodynamic Therapy  [Psychoeducation] : Psychoeducation  [de-identified] : Pt arrived on time for session. Session focused on continuing to process pt's feelings of disappoint and inadequacy around his rejection from a job he wanted. Explored the punitive self-talk in his mind and how obsessive he can be around trying to isolate what he had done to have been rejected, and the extent to which he blames himself. Writer supported pt in identifying that these are defenses against epistemic uncertainty and ambiguity, and how much he desires reason and logic to apply to things that may be outside of his control, and the ways in which this causes him to suffer. Nevertheless, pt had an interview the morning of this session and was very proud of his performance there. Writer commended pt for his ability to emotionally contain past feelings to show up for himself in this moment. Pt was cooperative and open to therapeutic inquiry. Session concluded with pt in good emotional control.  [Recommended Frequency of Visits: ____] : Recommended frequency of visits: [unfilled] [Return in ____ week(s)] : Return in [unfilled] week(s) [FreeTextEntry1] : continue twice weekly psychotherapy, medication management, modern masculinities group.

## 2023-12-27 ENCOUNTER — APPOINTMENT (OUTPATIENT)
Dept: PSYCHIATRY | Facility: CLINIC | Age: 58
End: 2023-12-27
Payer: MEDICAID

## 2023-12-27 PROCEDURE — 90834 PSYTX W PT 45 MINUTES: CPT | Mod: 95

## 2023-12-27 NOTE — PLAN
[FreeTextEntry2] : treatment will focus on supporting pt achieving his goals getting work, improving his mood, socializing more, decreasing perfectionism, and increasing motivation. will revisit treatment plan and duration in August. [Psychodynamic Therapy] : Psychodynamic Therapy  [Psychoeducation] : Psychoeducation  [de-identified] : Pt arrived on time for session. Session focused on exploring pt's experience being alone over the steph holiday, as his  was away visiting family. Dyad processed pt's experience going out to a [place] bar on his own, which he hadn't done in nearly a decade. Explored feelings of ryan, esteem, self-efficacy, excitement, mixed with guilt and shame. Explored how these feelings around guilt and shame relate to his relationship with his  and distant history around infidelity, which dyad agreed to discuss further in future sessions. Pt was cooperative and open to therapeutic inquiry. Session concluded with pt in good emotional control.  [Recommended Frequency of Visits: ____] : Recommended frequency of visits: [unfilled] [Return in ____ week(s)] : Return in [unfilled] week(s) [FreeTextEntry1] : continue twice weekly psychotherapy, medication management, modern masculinities group.

## 2023-12-27 NOTE — PHYSICAL EXAM
[Well groomed] : well groomed [Average] : average [Cooperative] : cooperative [Anxious] : anxious [Full] : full [Rapid] : rapid [Linear/Goal Directed] : linear/goal directed [WNL] : within normal limits [FreeTextEntry8] : frustrated [de-identified] : tearful at times

## 2024-01-03 ENCOUNTER — APPOINTMENT (OUTPATIENT)
Dept: PSYCHIATRY | Facility: CLINIC | Age: 59
End: 2024-01-03
Payer: MEDICAID

## 2024-01-03 PROCEDURE — 90834 PSYTX W PT 45 MINUTES: CPT | Mod: 95

## 2024-01-03 NOTE — PLAN
[FreeTextEntry2] : treatment will focus on supporting pt achieving his goals getting work, improving his mood, socializing more, decreasing perfectionism, and increasing motivation. will revisit treatment plan and duration in August. [Psychodynamic Therapy] : Psychodynamic Therapy  [Psychoeducation] : Psychoeducation  [de-identified] : Pt arrived on time for session. Session focused on exploring pt's self-sabotaging tendencies around refusal to engage in self-care activities in light of his recent job application disappointment. Explored his anger with himself and his feelings that he does not deserve. Also supported pt in recognizing the progress he has made in taking risks and being vulnerable in applying to jobs, something he has not done in the past ten years. Pt was cooperative and open to therapeutic inquiry. Session concluded with pt in good emotional control.  [Recommended Frequency of Visits: ____] : Recommended frequency of visits: [unfilled] [Return in ____ week(s)] : Return in [unfilled] week(s) [FreeTextEntry1] : continue twice weekly psychotherapy, medication management, modern masculinities group.

## 2024-01-03 NOTE — PHYSICAL EXAM
[Well groomed] : well groomed [Average] : average [Cooperative] : cooperative [Anxious] : anxious [Full] : full [Rapid] : rapid [Linear/Goal Directed] : linear/goal directed [WNL] : within normal limits [FreeTextEntry8] : frustrated [de-identified] : tearful at times

## 2024-01-08 ENCOUNTER — APPOINTMENT (OUTPATIENT)
Dept: PSYCHIATRY | Facility: CLINIC | Age: 59
End: 2024-01-08
Payer: MEDICAID

## 2024-01-08 PROCEDURE — 99215 OFFICE O/P EST HI 40 MIN: CPT | Mod: 95

## 2024-01-08 PROCEDURE — 90834 PSYTX W PT 45 MINUTES: CPT | Mod: 59

## 2024-01-08 PROCEDURE — 90853 GROUP PSYCHOTHERAPY: CPT | Mod: GT

## 2024-01-08 NOTE — PHYSICAL EXAM
[Well groomed] : well groomed [Average] : average [Cooperative] : cooperative [Anxious] : anxious [Full] : full [Rapid] : rapid [Linear/Goal Directed] : linear/goal directed [WNL] : within normal limits [FreeTextEntry8] : frustrated [de-identified] : tearful at times

## 2024-01-08 NOTE — PLAN
[FreeTextEntry2] : treatment will focus on supporting pt achieving his goals getting work, improving his mood, socializing more, decreasing perfectionism, and increasing motivation. will revisit treatment plan and duration in August. [Psychodynamic Therapy] : Psychodynamic Therapy  [Psychoeducation] : Psychoeducation  [de-identified] : Pt arrived on time for session. Session focused on exploring pt's recent anxiety around a job prospect and the impacts of this anxiety on his daily routine. Explored pt's need for things to be "just so" in his apt and the difficulty of tolerating when his partner does not adhere to these expectations. Pt reported preferring not to return to discussion of his past experiences with infidelity. Pt was cooperative and open to therapeutic inquiry. Session concluded with pt in good emotional control.  [Recommended Frequency of Visits: ____] : Recommended frequency of visits: [unfilled] [Return in ____ week(s)] : Return in [unfilled] week(s) [FreeTextEntry1] : continue twice weekly psychotherapy, medication management, modern masculinities group.

## 2024-01-10 ENCOUNTER — APPOINTMENT (OUTPATIENT)
Dept: PSYCHIATRY | Facility: CLINIC | Age: 59
End: 2024-01-10
Payer: MEDICAID

## 2024-01-10 PROCEDURE — 90834 PSYTX W PT 45 MINUTES: CPT | Mod: 95

## 2024-01-10 NOTE — PLAN
[FreeTextEntry2] : treatment will focus on supporting pt achieving his goals getting work, improving his mood, socializing more, decreasing perfectionism, and increasing motivation. will revisit treatment plan and duration in August. [Psychodynamic Therapy] : Psychodynamic Therapy  [Psychoeducation] : Psychoeducation  [de-identified] : Pt arrived on time for session. Session focused on processing disappointment of a recent job application rejection. Explored feelings of inadequacy and failure, and how these are feelings pt has felt for a very long time, even when he was employed. Explored the dynamic of having always tried to prove to himself that he was not a failure, while simultaneously having always felt like one. Explored the meaning of pt texting writer about this disappointment and how it felt for writer to respond empathically. Processed pt's envy of others in his cohort who are more financially successful. Pt was cooperative and open to therapeutic inquiry. Session concluded with pt in good emotional control.  [Recommended Frequency of Visits: ____] : Recommended frequency of visits: [unfilled] [Return in ____ week(s)] : Return in [unfilled] week(s) [FreeTextEntry1] : continue twice weekly psychotherapy, medication management, modern masculinities group.

## 2024-01-10 NOTE — DISCUSSION/SUMMARY
[Once a week] : once a week [60 minutes] : 60 minutes [de-identified] : Modern Masculinities Group [FreeTextEntry8] : Number of patients in group: 4 1. Discuss/Explore issues regarding experiences arising from being socialized as male including the impact on relationships and vulnerability  2. Develop relational insight into pt's social impact on others and better understand patterns related to the impact of others on them. Learn/develop boundaries in social situations  3. Learn to identify and tolerate a wider range of emotions and develop language around the discussion of emotions.  4. Develop techniques to self regulate/self soothe in overwhelming circumstances, and how to learn to rely on others for co-regulation.  [FreeTextEntry4] : Pt arrived on time for session and participated throughout session. He was interested in engaging other group members and hearing how they were doing and encouraging and validating them. He shared about his own ongoing feelings of hurt and bitterness at struggling to obtain a job. Left in good emotional/behavioral control.  [de-identified] : Plan: continue twice weekly therapy, medication management, group therapy.

## 2024-01-10 NOTE — PHYSICAL EXAM
[Well groomed] : well groomed [Average] : average [Cooperative] : cooperative [Anxious] : anxious [Full] : full [Rapid] : rapid [Linear/Goal Directed] : linear/goal directed [WNL] : within normal limits [FreeTextEntry8] : frustrated [de-identified] : tearful at times

## 2024-01-17 ENCOUNTER — APPOINTMENT (OUTPATIENT)
Dept: PSYCHIATRY | Facility: CLINIC | Age: 59
End: 2024-01-17
Payer: MEDICAID

## 2024-01-17 PROCEDURE — 90834 PSYTX W PT 45 MINUTES: CPT | Mod: 95

## 2024-01-17 NOTE — PLAN
[FreeTextEntry2] : treatment will focus on supporting pt achieving his goals getting work, improving his mood, socializing more, decreasing perfectionism, and increasing motivation. will revisit treatment plan and duration in August. [Psychodynamic Therapy] : Psychodynamic Therapy  [Psychoeducation] : Psychoeducation  [de-identified] : Pt arrived on time for session. Pt reported improved behavioral activation and self care engagement including resuming walking for exercise, regular routine, and socializing. Session focused on exploring relational dynamics with friends and pt's improved capacity to be vulnerable in his communication. Explored his relationship with his  and the sometimes difficulty of directly advocating for his needs. Pt was cooperative and open to therapeutic inquiry. Session concluded with pt in good emotional control.  [Recommended Frequency of Visits: ____] : Recommended frequency of visits: [unfilled] [Return in ____ week(s)] : Return in [unfilled] week(s) [FreeTextEntry1] : continue twice weekly psychotherapy, medication management, modern masculinities group.

## 2024-01-17 NOTE — PHYSICAL EXAM
[Well groomed] : well groomed [Average] : average [Cooperative] : cooperative [Anxious] : anxious [Full] : full [Rapid] : rapid [Linear/Goal Directed] : linear/goal directed [WNL] : within normal limits [FreeTextEntry8] : frustrated [de-identified] : tearful at times

## 2024-01-22 ENCOUNTER — APPOINTMENT (OUTPATIENT)
Dept: PSYCHIATRY | Facility: CLINIC | Age: 59
End: 2024-01-22
Payer: MEDICAID

## 2024-01-22 PROCEDURE — 90834 PSYTX W PT 45 MINUTES: CPT | Mod: 59

## 2024-01-22 PROCEDURE — 90853 GROUP PSYCHOTHERAPY: CPT | Mod: GT

## 2024-01-22 NOTE — PLAN
[FreeTextEntry2] : treatment will focus on supporting pt achieving his goals getting work, improving his mood, socializing more, decreasing perfectionism, and increasing motivation. will revisit treatment plan and duration in August. [Psychodynamic Therapy] : Psychodynamic Therapy  [Psychoeducation] : Psychoeducation  [de-identified] : Pt arrived on time for session. Session focused on exploring therapeutic relationship and pt's boundaries and insistence on not discussing history of infidelity with his partner. Explored feelings that came up around these boundaries and communicated them in session. Explored pt's own sexual history and the ways in which his romantic and sexual history was so much more open and liberal than his 's and the ways in which that affects their relationship. Pt was cooperative and open to therapeutic inquiry. Session concluded with pt in good emotional control.  [Recommended Frequency of Visits: ____] : Recommended frequency of visits: [unfilled] [Return in ____ week(s)] : Return in [unfilled] week(s) [FreeTextEntry1] : continue twice weekly psychotherapy, medication management, modern masculinities group.

## 2024-01-22 NOTE — PHYSICAL EXAM
[Well groomed] : well groomed [Average] : average [Cooperative] : cooperative [Anxious] : anxious [Full] : full [Rapid] : rapid [Linear/Goal Directed] : linear/goal directed [WNL] : within normal limits [FreeTextEntry8] : frustrated [de-identified] : tearful at times

## 2024-01-22 NOTE — REASON FOR VISIT
[FreeTextEntry1] : Pt requests psychotherapy to focus on anhedonia, low motivation, feelings of "stuckness", processing his father's recent death, feelings of humiliation and guilt, perfectionism [Patient] : Patient

## 2024-01-24 ENCOUNTER — APPOINTMENT (OUTPATIENT)
Dept: PSYCHIATRY | Facility: CLINIC | Age: 59
End: 2024-01-24
Payer: MEDICAID

## 2024-01-24 PROCEDURE — 90834 PSYTX W PT 45 MINUTES: CPT | Mod: 95

## 2024-01-24 NOTE — PLAN
[FreeTextEntry2] : treatment will focus on supporting pt achieving his goals getting work, improving his mood, socializing more, decreasing perfectionism, and increasing motivation. will revisit treatment plan and duration in August. [Psychodynamic Therapy] : Psychodynamic Therapy  [Psychoeducation] : Psychoeducation  [de-identified] : Pt arrived on time for session. Session focused on processing pt's experience of MM group from monday. Explored his feelings in response to another group member articulating his admiration for him and how good this made him feel to be seen in this way. Explored his feelings toward the group as it relates to his relationship with writer who also leads that group. Explored his relationships with individual group members. Also processed pt's feelings of seeing a comedy special that depicts depression in a way that resonated with him. Pt was cooperative and open to therapeutic inquiry. Session concluded with pt in good emotional control.  [Recommended Frequency of Visits: ____] : Recommended frequency of visits: [unfilled] [FreeTextEntry1] : continue twice weekly psychotherapy, medication management, modern masculinities group.  [Return in ____ week(s)] : Return in [unfilled] week(s)

## 2024-01-29 ENCOUNTER — APPOINTMENT (OUTPATIENT)
Dept: PSYCHIATRY | Facility: CLINIC | Age: 59
End: 2024-01-29
Payer: MEDICAID

## 2024-01-29 PROCEDURE — 90853 GROUP PSYCHOTHERAPY: CPT | Mod: GT

## 2024-01-29 PROCEDURE — 90834 PSYTX W PT 45 MINUTES: CPT | Mod: 59

## 2024-01-29 NOTE — PLAN
[FreeTextEntry2] : treatment will focus on supporting pt achieving his goals getting work, improving his mood, socializing more, decreasing perfectionism, and increasing motivation. will revisit treatment plan and duration in August. [Psychodynamic Therapy] : Psychodynamic Therapy  [Psychoeducation] : Psychoeducation  [de-identified] : Pt arrived on time for session. Session focused on exploring pt's admirations to make more money due to the experience of financial marginalization, struggling to participate in socialization in the ways he used to. Explored the ways in which he relates to showtunes and how they him articulate feelings and emotions that he struggles to articulate for himself. Explored his current social relationships and what he wants from them. Pt was cooperative and open to therapeutic inquiry. Session concluded with pt in good emotional control.  [Recommended Frequency of Visits: ____] : Recommended frequency of visits: [unfilled] [Return in ____ week(s)] : Return in [unfilled] week(s) [FreeTextEntry1] : continue twice weekly psychotherapy, medication management, modern masculinities group.

## 2024-01-29 NOTE — PHYSICAL EXAM
[Well groomed] : well groomed [Average] : average [Cooperative] : cooperative [Anxious] : anxious [Full] : full [Rapid] : rapid [Linear/Goal Directed] : linear/goal directed [WNL] : within normal limits [FreeTextEntry8] : frustrated [de-identified] : tearful at times

## 2024-01-30 NOTE — REASON FOR VISIT
[Other:___] : due to [unfilled] [Continuing, patient seen in-person within last 12 months] : Telehealth services are continuing as patient has been seen in-person within last 12 months. [Telehealth (audio & video) - Individual/Group] : This visit was provided via telehealth using real-time 2-way audio visual technology. [Medical Office: ([facility])___] : The provider was located at the medical office in [unfilled]. [Home] : The patient, [unfilled], was located at home, [unfilled], at the time of the visit. [Verbal consent obtained from patient/other participant(s)] : Verbal consent for telehealth/telephonic services obtained from patient/other participant(s) [FreeTextEntry4] : 5pm [FreeTextEntry5] : 6pm

## 2024-01-30 NOTE — DISCUSSION/SUMMARY
[Once a week] : once a week [60 minutes] : 60 minutes [de-identified] : Modern Masculinities Group [FreeTextEntry8] : Number of patients in group: 3 1. Discuss/Explore issues regarding experiences arising from being socialized as male including the impact on relationships and vulnerability  2. Develop relational insight into pt's social impact on others and better understand patterns related to the impact of others on them. Learn/develop boundaries in social situations  3. Learn to identify and tolerate a wider range of emotions and develop language around the discussion of emotions.  4. Develop techniques to self regulate/self soothe in overwhelming circumstances, and how to learn to rely on others for co-regulation.  [FreeTextEntry4] : Pt arrived on time for session and participated throughout session. He was interested in engaging other group members and hearing how they were doing and encouraging and validating them. He was supportive and encouraging to other group members who were having recent life difficulties, and provided his opinion and advice appropriately. He shared his own self-consciousness, wondering if he was being to pushy or intrusive while also affirming that it was important for him to know how people are doing. Left in good emotional/behavioral control.  [de-identified] : Plan: continue twice weekly therapy, medication management, group therapy.

## 2024-01-31 ENCOUNTER — APPOINTMENT (OUTPATIENT)
Dept: PSYCHIATRY | Facility: CLINIC | Age: 59
End: 2024-01-31
Payer: MEDICAID

## 2024-01-31 PROCEDURE — 90834 PSYTX W PT 45 MINUTES: CPT | Mod: 95

## 2024-01-31 NOTE — PHYSICAL EXAM
[Well groomed] : well groomed [Average] : average [Cooperative] : cooperative [Anxious] : anxious [Full] : full [Rapid] : rapid [Linear/Goal Directed] : linear/goal directed [WNL] : within normal limits [FreeTextEntry8] : frustrated [de-identified] : tearful at times

## 2024-01-31 NOTE — PLAN
[FreeTextEntry2] : treatment will focus on supporting pt achieving his goals getting work, improving his mood, socializing more, decreasing perfectionism, and increasing motivation. will revisit treatment plan and duration in August. [Psychoeducation] : Psychoeducation  [Psychodynamic Therapy] : Psychodynamic Therapy  [de-identified] : Pt arrived on time for session. Session focused initially on exploring a dream pt had in which he had to cancel therapy and felt anxious about the ramifications. Processed this in terms of the transference and relationship with writer. Explored other ways in which this anxieyt manifests in addition to the possible role of unconscious frustration/anger. Spent some time processing pt's experience of recent MM group and what he had learned in terms of how others experience him. Pt was cooperative and open to therapeutic inquiry. Session concluded with pt in good emotional control.  [Recommended Frequency of Visits: ____] : Recommended frequency of visits: [unfilled] [Return in ____ week(s)] : Return in [unfilled] week(s) [FreeTextEntry1] : continue twice weekly psychotherapy, medication management, modern masculinities group.

## 2024-02-05 ENCOUNTER — APPOINTMENT (OUTPATIENT)
Dept: PSYCHIATRY | Facility: CLINIC | Age: 59
End: 2024-02-05
Payer: MEDICAID

## 2024-02-05 PROCEDURE — 90834 PSYTX W PT 45 MINUTES: CPT

## 2024-02-05 PROCEDURE — G2211 COMPLEX E/M VISIT ADD ON: CPT | Mod: NC,1L,95

## 2024-02-05 PROCEDURE — 90834 PSYTX W PT 45 MINUTES: CPT | Mod: 59

## 2024-02-05 PROCEDURE — 99214 OFFICE O/P EST MOD 30 MIN: CPT | Mod: 95

## 2024-02-05 PROCEDURE — 90853 GROUP PSYCHOTHERAPY: CPT | Mod: 95

## 2024-02-05 NOTE — SOCIAL HISTORY
[FreeTextEntry1] : Pt has bachelor's degree in history, worked in beauty/cosmetic industry for 30 years, worked at [employer] but currently unemployed. Works as election poll worker seasonally. , living with . Born [place] raised [place], moved to [place], then [place], then [place], then [place], then [place] in 2009. \par  Denies any abuse history.\par

## 2024-02-05 NOTE — REVIEW OF SYSTEMS
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

## 2024-02-05 NOTE — DISCUSSION/SUMMARY
[Date of Last Physical Exam: _____] : Date of Last Physical Exam: [unfilled] [Date of Last Annual Labs: _____] : Date of Last Annual Labs: [unfilled] [Annual Review of Systems Completed?] : Annual Review of Systems Completed: Yes [Tobacco Screening Completed?] : Tobacco Screening Completed: Yes [FreeTextEntry1] : Patient is a 58 year old white male, currently unemployed, former executive in cosmetics industry, domiciled with , with reported psychiatric history of bipolar disorder diagnosed in 2016, with no IPP admissions, initially presented to clinic to re-establish psychiatric care after having last seen psychiatrist in 2017 for medication management as well as therapy, presenting today for follow-up. On initial evaluation, patient had presented with symptoms suggestive of Generalized Anxiety Disorder, but did not appear acutely depressed, suicidal, manic, or psychotic. While patient reported history of Bipolar 1 Disorder, he did not report any lifetime manic episodes and patient's medication regimen (Lamictal 25 mg daily) would not likely have been effective in treatment of Bipolar 1 Disorder.   Even so, he was having frequent mood instability that improved with titration upwards of Lamictal.     Dx: Bipolar Spectrum Disorder  Patient more dysphoric with greater insomnia in context of having been passed over for [employer] job after very promising follow up interviews..

## 2024-02-05 NOTE — REASON FOR VISIT
[Telehealth (audio & video) - Individual/Group] : This visit was provided via telehealth using real-time 2-way audio visual technology. [Other Location: e.g. Home (Enter Location, City,State)___] : The provider was located at [unfilled]. [Home] : The patient, [unfilled], was located at home, [unfilled], at the time of the visit. [Verbal consent obtained from patient/other participant(s)] : Verbal consent for telehealth/telephonic services obtained from patient/other participant(s) [Patient] : Patient [FreeTextEntry4] : 1:08 pm [FreeTextEntry5] : 1:23 pm [FreeTextEntry2] : 2/24/23 [FreeTextEntry1] : follow up treatment of DIMITRI, insomnia and Bipolar spectrum disorder

## 2024-02-05 NOTE — PHYSICAL EXAM
[Well groomed] : well groomed [Average] : average [Cooperative] : cooperative [Anxious] : anxious [Full] : full [Rapid] : rapid [Linear/Goal Directed] : linear/goal directed [WNL] : within normal limits [FreeTextEntry8] : frustrated [de-identified] : tearful at times

## 2024-02-05 NOTE — PLAN
[Yes. details: ___] : Yes, [unfilled] [Medication education provided] : Medication education provided. [Rationale for medication choices, possible risks/precautions, benefits, alternative treatment choices, and consequences of non-treatment discussed] : Rationale for medication choices, possible risks/precautions, benefits, alternative treatment choices, and consequences of non-treatment discussed with patient/family/caregiver  [FreeTextEntry5] : Offered support/encouragement, psychoeducation, counseling regarding: diagnosis/diagnoses, medications, symptoms, recommendations etc.  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

## 2024-02-05 NOTE — HISTORY OF PRESENT ILLNESS
[Not Applicable] : Not applicable [FreeTextEntry1] : Nilson seen remotely today.  Has not been going out much but planning to start more; focusing on this in therapy.  Stated he is not hindered by the cold weather but moreso by disappointments especially related to job search.  Working on eating healthier and went to [employer] exhibit at the [facility].   Continues to apply for jobs.   [FreeTextEntry2] : Dx with bipolar disorder in 2016, however no clear manic episode reported in lifetime. No IPP admissions in lifetime, saw 3 prior psychiatrists, last time was in 2017 - PMD continued prescribing psychiatric medications since then.  [FreeTextEntry3] : was first started on medications in [id number]: wellbutrin ER 300mg, lamotrigine 25 mg daily (started at 50mg, then brought down to 25mg), zolpidem 10mg PRN (1/2 of 1 PRN, says 30 days lasts 3 months. Denies any other medication trials.

## 2024-02-05 NOTE — PLAN
[FreeTextEntry2] : treatment will focus on supporting pt achieving his goals getting work, improving his mood, socializing more, decreasing perfectionism, and increasing motivation. will revisit treatment plan and duration in August. [Psychodynamic Therapy] : Psychodynamic Therapy  [Psychoeducation] : Psychoeducation  [de-identified] : Pt arrived on time for session. Session focused on exploring pt's conflict around achieving goals he's set for himself, particularly around exercise and healthy eating. Explored his struggle motivating himself toward these behaviors and also explored his unconscious motivations NOT to engage in these behaviors and to avoid change. Pt made analogy to drug use and the ways in which one can use it to "escape reality" and explored how he can use tv to avoid working toward goals because the pain of facing his own disappointment is too great. Pt named how his mother's death anniversary is 2/7. Pt was cooperative and open to therapeutic inquiry. Session concluded with pt in good emotional control.  [Recommended Frequency of Visits: ____] : Recommended frequency of visits: [unfilled] [Return in ____ week(s)] : Return in [unfilled] week(s) [FreeTextEntry1] : continue twice weekly psychotherapy, medication management, modern masculinities group.

## 2024-02-05 NOTE — PHYSICAL EXAM
[Average] : average [Cooperative] : cooperative [Depressed] : depressed [Anxious] : anxious [Constricted] : constricted [Clear] : clear [Linear/Goal Directed] : linear/goal directed [WNL] : within normal limits [No] : No [de-identified] : chronically fast paced in anxious seeming way [Individual reports tobacco use during the last 30 days?] : Individual reports tobacco use during the last 30 days? No

## 2024-02-06 NOTE — DISCUSSION/SUMMARY
[Once a week] : once a week [60 minutes] : 60 minutes [de-identified] : Modern Masculinities Group [FreeTextEntry8] : Number of patients in group: 4 1. Discuss/Explore issues regarding experiences arising from being socialized as male including the impact on relationships and vulnerability  2. Develop relational insight into pt's social impact on others and better understand patterns related to the impact of others on them. Learn/develop boundaries in social situations  3. Learn to identify and tolerate a wider range of emotions and develop language around the discussion of emotions.  4. Develop techniques to self regulate/self soothe in overwhelming circumstances, and how to learn to rely on others for co-regulation.  [FreeTextEntry4] : Pt arrived on time for session and participated throughout session. He was interested in engaging other group members and hearing how they were doing and encouraging and validating them. He was supportive and encouraging to other group members who were having recent life difficulties in terms of finding work and holding their boundaries, and provided his opinion and advice appropriately. He identified with others experiences of shame around being unemployed and struggling to let others in as a result. Left in good emotional/behavioral control.  [de-identified] : Plan: continue twice weekly therapy, medication management, group therapy.

## 2024-02-06 NOTE — REASON FOR VISIT
[Other:___] : due to [unfilled] [Continuing, patient seen in-person within last 12 months] : Telehealth services are continuing as patient has been seen in-person within last 12 months. [Telehealth (audio & video) - Individual/Group] : This visit was provided via telehealth using real-time 2-way audio visual technology. [Medical Office: ([facility])___] : The provider was located at the medical office in [unfilled]. [Home] : The patient, [unfilled], was located at home, [unfilled], at the time of the visit. [Verbal consent obtained from patient/other participant(s)] : Verbal consent for telehealth/telephonic services obtained from patient/other participant(s) [FreeTextEntry5] : 6pm [FreeTextEntry4] : 5pm

## 2024-02-07 ENCOUNTER — APPOINTMENT (OUTPATIENT)
Dept: PSYCHIATRY | Facility: CLINIC | Age: 59
End: 2024-02-07
Payer: MEDICAID

## 2024-02-07 PROCEDURE — 90834 PSYTX W PT 45 MINUTES: CPT | Mod: 95

## 2024-02-12 ENCOUNTER — APPOINTMENT (OUTPATIENT)
Dept: PSYCHIATRY | Facility: CLINIC | Age: 59
End: 2024-02-12

## 2024-02-15 ENCOUNTER — APPOINTMENT (OUTPATIENT)
Dept: PSYCHIATRY | Facility: CLINIC | Age: 59
End: 2024-02-15
Payer: MEDICAID

## 2024-02-15 PROCEDURE — 90834 PSYTX W PT 45 MINUTES: CPT | Mod: 95

## 2024-02-15 NOTE — PLAN
[FreeTextEntry2] : treatment will focus on supporting pt achieving his goals getting work, improving his mood, socializing more, decreasing perfectionism, and increasing motivation. will revisit treatment plan and duration in August. [Psychodynamic Therapy] : Psychodynamic Therapy  [Psychoeducation] : Psychoeducation  [de-identified] : Pt arrived on time for session. Session focused on exploring pt's experience of being quite sick with strep throat over the past week, and what it was like allowing his  to take care of him. Explored his anxiety about his upcoming surgery and his fixation on the logistics around it. Discussed upcoming plans to spend time with friends that he was excited about and commended him for his continued persistence in applying for jobs despite fears of rejection.  Session concluded with pt in good emotional control.  [Recommended Frequency of Visits: ____] : Recommended frequency of visits: [unfilled] [Return in ____ week(s)] : Return in [unfilled] week(s) [FreeTextEntry1] : continue twice weekly psychotherapy, medication management, modern masculinities group.

## 2024-02-15 NOTE — REASON FOR VISIT
[Continuing, patient seen in-person within last 12 months] : Telehealth services are continuing as patient has been seen in-person within last 12 months. [Telehealth (audio & video) - Individual/Group] : This visit was provided via telehealth using real-time 2-way audio visual technology. [Other Location: e.g. Home (Enter Location, City,State)___] : The provider was located at [unfilled]. [Home] : The patient, [unfilled], was located at home, [unfilled], at the time of the visit. [Verbal consent obtained from patient/other participant(s)] : Verbal consent for telehealth/telephonic services obtained from patient/other participant(s) [FreeTextEntry4] : 12:45pm [FreeTextEntry5] : 130pm [FreeTextEntry2] : 11/13/23 [Patient] : Patient [FreeTextEntry1] : Pt requests psychotherapy to focus on anhedonia, low motivation, feelings of "stuckness", processing his father's recent death, feelings of humiliation and guilt, perfectionism

## 2024-02-15 NOTE — PHYSICAL EXAM
[Well groomed] : well groomed [Average] : average [Cooperative] : cooperative [Anxious] : anxious [Full] : full [Rapid] : rapid [Linear/Goal Directed] : linear/goal directed [WNL] : within normal limits [FreeTextEntry8] : frustrated [de-identified] : tearful at times

## 2024-02-21 ENCOUNTER — APPOINTMENT (OUTPATIENT)
Dept: PSYCHIATRY | Facility: CLINIC | Age: 59
End: 2024-02-21

## 2024-02-26 ENCOUNTER — APPOINTMENT (OUTPATIENT)
Dept: PSYCHIATRY | Facility: CLINIC | Age: 59
End: 2024-02-26
Payer: MEDICAID

## 2024-02-26 PROCEDURE — 90853 GROUP PSYCHOTHERAPY: CPT | Mod: 95

## 2024-02-26 PROCEDURE — 90834 PSYTX W PT 45 MINUTES: CPT

## 2024-02-26 PROCEDURE — 90834 PSYTX W PT 45 MINUTES: CPT | Mod: 59

## 2024-02-26 NOTE — PHYSICAL EXAM
[Well groomed] : well groomed [Average] : average [Cooperative] : cooperative [Anxious] : anxious [Full] : full [Rapid] : rapid [Linear/Goal Directed] : linear/goal directed [WNL] : within normal limits [FreeTextEntry8] : frustrated [de-identified] : tearful at times

## 2024-02-26 NOTE — PLAN
Patient, Alvarez Adkins (pts care giver) and Cristobal Rodriguez (pts ), were all notified of the below results. Patient was educated on hyperkalemia and was agreeable to go to the emergency room. ----- Message from Seng Garcia DO sent at 7/19/2022  3:09 PM CDT -----  Please let the patient know -     Potassium is elevated to 6.5   Sodium is slightly lower at 128  Recommending evaluation at the ER at this time for hyperkalemia in light of all her underlying cardiac and renal issues. [FreeTextEntry2] : treatment will focus on supporting pt achieving his goals getting work, improving his mood, socializing more, decreasing perfectionism, and increasing motivation. will revisit treatment plan and duration in August. [Psychodynamic Therapy] : Psychodynamic Therapy  [Psychoeducation] : Psychoeducation  [de-identified] : Pt arrived on time for session. Session focused on exploring pt's experience of writer being away the previous week, particularly how he had felt depressed and avoidant, as well as ashamed that he was struggling so much. Explored his avoidance of letting in his partner or his friends in his difficulties, and how it's related to shame around need and dependency. Explored the way he makes excuses for this shame as a form of acting it out. Explored his inability to have compassion for himself in his loneliness and his "instinct" toward self-recrimination. Session concluded with pt in good emotional control.  [Recommended Frequency of Visits: ____] : Recommended frequency of visits: [unfilled] [Return in ____ week(s)] : Return in [unfilled] week(s) [FreeTextEntry1] : continue twice weekly psychotherapy, medication management, modern masculinities group.

## 2024-02-27 NOTE — DISCUSSION/SUMMARY
[Once a week] : once a week [de-identified] : Modern Masculinities Group [60 minutes] : 60 minutes [FreeTextEntry8] : Number of patients in group: 5 1. Discuss/Explore issues regarding experiences arising from being socialized as male including the impact on relationships and vulnerability  2. Develop relational insight into pt's social impact on others and better understand patterns related to the impact of others on them. Learn/develop boundaries in social situations  3. Learn to identify and tolerate a wider range of emotions and develop language around the discussion of emotions.  4. Develop techniques to self regulate/self soothe in overwhelming circumstances, and how to learn to rely on others for co-regulation.  [FreeTextEntry4] : Pt arrived on time for session and participated throughout session. He was interested in engaging other group members and hearing how they were doing and encouraging and validating them. He identified with others experiences of shame around being unemployed and struggling to let others in as a result. He was able to identify the importance of experiencing connection when one is having a hard time in life as well as able to identify how socialization as a man stigmatizes sharing of experiences in ways that prevent connection. Able to identify how this results in avoidance in his own life. Left in good emotional/behavioral control.  [de-identified] : Plan: continue twice weekly therapy, medication management, group therapy.

## 2024-02-27 NOTE — REASON FOR VISIT
[Other:___] : due to [unfilled] [Continuing, patient seen in-person within last 12 months] : Telehealth services are continuing as patient has been seen in-person within last 12 months. [Medical Office: ([facility])___] : The provider was located at the medical office in [unfilled]. [Telehealth (audio & video) - Individual/Group] : This visit was provided via telehealth using real-time 2-way audio visual technology. [Home] : The patient, [unfilled], was located at home, [unfilled], at the time of the visit. [Verbal consent obtained from patient/other participant(s)] : Verbal consent for telehealth/telephonic services obtained from patient/other participant(s) [FreeTextEntry4] : 5pm [FreeTextEntry5] : 6pm

## 2024-02-28 ENCOUNTER — APPOINTMENT (OUTPATIENT)
Dept: PSYCHIATRY | Facility: CLINIC | Age: 59
End: 2024-02-28
Payer: MEDICAID

## 2024-02-28 PROCEDURE — 90834 PSYTX W PT 45 MINUTES: CPT | Mod: 95

## 2024-02-28 NOTE — PLAN
[FreeTextEntry2] : treatment will focus on supporting pt achieving his goals getting work, improving his mood, socializing more, decreasing perfectionism, and increasing motivation. will revisit treatment plan and duration in August. [Psychodynamic Therapy] : Psychodynamic Therapy  [Psychoeducation] : Psychoeducation  [de-identified] : Pt arrived on time for session. Session focused on processing pt's experience in group past Monday. Explored his tendency to care for others, and prefer not to share himself. Explored the ways in which he feels he has been vulnerable in the past in the group, but that he's hoping for more vulnerability for other group members and so witholds his own material at times in the group. Explored the disparity between his sense of vulnerability with the reality that he often asks others about themselves as a way of avoiding talking about himself. Supported pt in considering strategies for speaking to his internal process rather than concretely about the material in his mind. Explored pt's assumptions about writer's experience having painted nails. Pt was cooperative and open to therapeutic inquiry. Session concluded with pt in good emotional control.  [Recommended Frequency of Visits: ____] : Recommended frequency of visits: [unfilled] [Return in ____ week(s)] : Return in [unfilled] week(s) [FreeTextEntry1] : continue twice weekly psychotherapy, medication management, modern masculinities group.

## 2024-02-28 NOTE — PHYSICAL EXAM
[Well groomed] : well groomed [Average] : average [Cooperative] : cooperative [Anxious] : anxious [Full] : full [Rapid] : rapid [Linear/Goal Directed] : linear/goal directed [WNL] : within normal limits [de-identified] : tearful at times [FreeTextEntry8] : frustrated

## 2024-03-01 ENCOUNTER — OUTPATIENT (OUTPATIENT)
Dept: OUTPATIENT SERVICES | Facility: HOSPITAL | Age: 59
LOS: 1 days | Discharge: HOME | End: 2024-03-01

## 2024-03-04 ENCOUNTER — APPOINTMENT (OUTPATIENT)
Dept: PSYCHIATRY | Facility: CLINIC | Age: 59
End: 2024-03-04
Payer: MEDICAID

## 2024-03-04 PROCEDURE — 90853 GROUP PSYCHOTHERAPY: CPT | Mod: GT

## 2024-03-04 PROCEDURE — G2211 COMPLEX E/M VISIT ADD ON: CPT | Mod: NC,1L,95

## 2024-03-04 PROCEDURE — 99213 OFFICE O/P EST LOW 20 MIN: CPT | Mod: 95

## 2024-03-04 PROCEDURE — 90834 PSYTX W PT 45 MINUTES: CPT | Mod: 59

## 2024-03-04 NOTE — PLAN
[FreeTextEntry2] : treatment will focus on supporting pt achieving his goals getting work, improving his mood, socializing more, decreasing perfectionism, and increasing motivation. will revisit treatment plan and duration in August. [Psychodynamic Therapy] : Psychodynamic Therapy  [Psychoeducation] : Psychoeducation  [de-identified] : Pt arrived on time for session. Session focused on exploring pt's experience of a new friendship, and the ways in which it has enlivened him. Also explored the feelings of guilt pt experiences due to the way it has affected his 's feelings. Explored pt's own feelings of guilt and supported pt in  his projections from his feelings. Session concluded with pt in good emotional control.  [Recommended Frequency of Visits: ____] : Recommended frequency of visits: [unfilled] [Return in ____ week(s)] : Return in [unfilled] week(s) [FreeTextEntry1] : continue twice weekly psychotherapy, medication management, modern masculinities group.

## 2024-03-04 NOTE — PHYSICAL EXAM
[Well groomed] : well groomed [Average] : average [Cooperative] : cooperative [Anxious] : anxious [Full] : full [Rapid] : rapid [Linear/Goal Directed] : linear/goal directed [WNL] : within normal limits [FreeTextEntry8] : frustrated [de-identified] : tearful at times

## 2024-03-05 NOTE — REASON FOR VISIT
[Other:___] : due to [unfilled] [Continuing, patient seen in-person within last 12 months] : Telehealth services are continuing as patient has been seen in-person within last 12 months. [Telehealth (audio & video) - Individual/Group] : This visit was provided via telehealth using real-time 2-way audio visual technology. [Medical Office: ([facility])___] : The provider was located at the medical office in [unfilled]. [Verbal consent obtained from patient/other participant(s)] : Verbal consent for telehealth/telephonic services obtained from patient/other participant(s) [Home] : The patient, [unfilled], was located at home, [unfilled], at the time of the visit. [FreeTextEntry4] : 5pm [FreeTextEntry5] : 6pm

## 2024-03-05 NOTE — DISCUSSION/SUMMARY
[Once a week] : once a week [60 minutes] : 60 minutes [de-identified] : Modern Masculinities Group [FreeTextEntry4] : Pt arrived on time for session and participated throughout session. He was interested in engaging other group members and hearing how they were doing and encouraging and validating them. He was empathic and supportive to another group member struggling with a recent medical diagnosis. He was aware of his tendency to try to use humor to alleviate his own powerlessness and discomfort. Left in good emotional/behavioral control.  [FreeTextEntry8] : Number of patients in group: 5 1. Discuss/Explore issues regarding experiences arising from being socialized as male including the impact on relationships and vulnerability  2. Develop relational insight into pt's social impact on others and better understand patterns related to the impact of others on them. Learn/develop boundaries in social situations  3. Learn to identify and tolerate a wider range of emotions and develop language around the discussion of emotions.  4. Develop techniques to self regulate/self soothe in overwhelming circumstances, and how to learn to rely on others for co-regulation.  [de-identified] : Plan: continue twice weekly therapy, medication management, group therapy.

## 2024-03-06 ENCOUNTER — APPOINTMENT (OUTPATIENT)
Dept: PSYCHIATRY | Facility: CLINIC | Age: 59
End: 2024-03-06

## 2024-03-07 NOTE — RISK ASSESSMENT
independent [FreeTextEntry8] : pt denied thoughts of suicide [FreeTextEntry7] : no evidence of current violent or homicidal ideation

## 2024-03-08 DIAGNOSIS — F41.9 ANXIETY DISORDER, UNSPECIFIED: ICD-10-CM

## 2024-03-11 ENCOUNTER — APPOINTMENT (OUTPATIENT)
Dept: PSYCHIATRY | Facility: CLINIC | Age: 59
End: 2024-03-11

## 2024-03-13 ENCOUNTER — APPOINTMENT (OUTPATIENT)
Dept: PSYCHIATRY | Facility: CLINIC | Age: 59
End: 2024-03-13

## 2024-03-18 ENCOUNTER — APPOINTMENT (OUTPATIENT)
Dept: PSYCHIATRY | Facility: CLINIC | Age: 59
End: 2024-03-18
Payer: MEDICAID

## 2024-03-18 PROCEDURE — 90834 PSYTX W PT 45 MINUTES: CPT | Mod: 59

## 2024-03-18 PROCEDURE — 90853 GROUP PSYCHOTHERAPY: CPT | Mod: 95

## 2024-03-18 NOTE — PLAN
[FreeTextEntry2] : treatment will focus on supporting pt achieving his goals getting work, improving his mood, socializing more, decreasing perfectionism, and increasing motivation. will revisit treatment plan and duration in August. [Psychodynamic Therapy] : Psychodynamic Therapy  [Psychoeducation] : Psychoeducation  [Recommended Frequency of Visits: ____] : Recommended frequency of visits: [unfilled] [de-identified] : Pt arrived on time for session.  Session focused on processing pt's experience of surgery and its aftermath following wrist/thumb surgery. Pt reported struggling to deal with the pain and needing more medication than he anticipated, and feeling some ambivalence about needing to ask for more medicine. Processed interpersonal conflict with pt's  around receiving help from him, particularly as it relates to each of them wanting to "do things his own way." Pt open to and appreciative of interventions. Session concluded with pt in good emotional control.  [FreeTextEntry1] : continue twice weekly psychotherapy, medication management, modern masculinities group.  [Return in ____ week(s)] : Return in [unfilled] week(s)

## 2024-03-18 NOTE — PHYSICAL EXAM
[Well groomed] : well groomed [Average] : average [Cooperative] : cooperative [Anxious] : anxious [Full] : full [Rapid] : rapid [Linear/Goal Directed] : linear/goal directed [WNL] : within normal limits [FreeTextEntry8] : frustrated [de-identified] : tearful at times

## 2024-03-19 NOTE — DISCUSSION/SUMMARY
[Once a week] : once a week [de-identified] : Modern Masculinities Group [60 minutes] : 60 minutes [FreeTextEntry4] : Pt arrived on time for session and participated throughout session. He was interested in engaging other group members and hearing how they were doing and encouraging and validating them. He was empathic and supportive to another group member struggling with a recent medical diagnosis. He was able to relate to others about the conflict between socialized masculinity and accepting support from others, particularly other men. Left in good emotional/behavioral control.  [FreeTextEntry8] : Number of patients in group: 3 1. Discuss/Explore issues regarding experiences arising from being socialized as male including the impact on relationships and vulnerability  2. Develop relational insight into pt's social impact on others and better understand patterns related to the impact of others on them. Learn/develop boundaries in social situations  3. Learn to identify and tolerate a wider range of emotions and develop language around the discussion of emotions.  4. Develop techniques to self regulate/self soothe in overwhelming circumstances, and how to learn to rely on others for co-regulation.  [de-identified] : Plan: continue twice weekly therapy, medication management, group therapy.

## 2024-03-19 NOTE — REASON FOR VISIT
[Other:___] : due to [unfilled] [Continuing, patient seen in-person within last 12 months] : Telehealth services are continuing as patient has been seen in-person within last 12 months. [Telehealth (audio & video) - Individual/Group] : This visit was provided via telehealth using real-time 2-way audio visual technology. [Medical Office: ([facility])___] : The provider was located at the medical office in [unfilled]. [Home] : The patient, [unfilled], was located at home, [unfilled], at the time of the visit. [Verbal consent obtained from patient/other participant(s)] : Verbal consent for telehealth/telephonic services obtained from patient/other participant(s) [FreeTextEntry4] : 5pm [FreeTextEntry5] : 6pm

## 2024-03-20 ENCOUNTER — APPOINTMENT (OUTPATIENT)
Dept: PSYCHIATRY | Facility: CLINIC | Age: 59
End: 2024-03-20
Payer: MEDICAID

## 2024-03-20 ENCOUNTER — NON-APPOINTMENT (OUTPATIENT)
Age: 59
End: 2024-03-20

## 2024-03-20 PROCEDURE — 90834 PSYTX W PT 45 MINUTES: CPT | Mod: 95

## 2024-03-20 NOTE — PLAN
[FreeTextEntry2] : treatment will focus on supporting pt achieving his goals getting work, improving his mood, socializing more, decreasing perfectionism, and increasing motivation. will revisit treatment plan and duration in August. [Psychoeducation] : Psychoeducation  [Psychodynamic Therapy] : Psychodynamic Therapy  [de-identified] : Pt arrived on time for session.  Session focused primarily on review and completion of treatment plan and relevant measures. Dyad also discussed recent MM group and pt's experience of other members. Also discussed his experience joining the LGBTQ Community Board at [facility] and what that experience was like for him. Pt open to and appreciative of interventions. Session concluded with pt in good emotional control.  [Recommended Frequency of Visits: ____] : Recommended frequency of visits: [unfilled] [Return in ____ week(s)] : Return in [unfilled] week(s) [FreeTextEntry1] : continue twice weekly psychotherapy, medication management, modern masculinities group.

## 2024-03-20 NOTE — PHYSICAL EXAM
[1 - 3 or 4] : 1 - 3 or 4 [1 - Monthly or less] : 1 - Monthly or less [1 - Less than monthly] : 1 - Less than monthly [0 - Never] : 0 - Never [0 - No] : 0 - No [5] : 5 [4] : 4 [3] : 3 [] : No [Individual reports use of the following tobacco products during the last 30 days?] : Individual reports use of the following tobacco products during the last 30 days? No [Individual reports tobacco use during the last 30 days?] : Individual reports tobacco use during the last 30 days? No [Individual reports current use of tobacco cessation medication or nicotine replacement therapy?] : Individual reports current use of tobacco cessation medication or nicotine replacement therapy? No [Was tobacco cessation medication and/or nicotine replacement therapy recommended?] : Was tobacco cessation medication and/or nicotine replacement therapy recommended? No [Does individual accept referral to MD for cessation medication or NRT?] : Does individual accept referral to MD for cessation medication or NRT? No [FreeTextEntry1] : 0

## 2024-03-20 NOTE — REASON FOR VISIT
[Continuing, patient seen in-person within last 12 months] : Telehealth services are continuing as patient has been seen in-person within last 12 months. [Telehealth (audio & video) - Individual/Group] : This visit was provided via telehealth using real-time 2-way audio visual technology. [Other Location: e.g. Home (Enter Location, City,State)___] : The provider was located at [unfilled]. [Home] : The patient, [unfilled], was located at home, [unfilled], at the time of the visit. [Verbal consent obtained from patient/other participant(s)] : Verbal consent for telehealth/telephonic services obtained from patient/other participant(s) [FreeTextEntry5] : 345pm [FreeTextEntry4] : 3pm [FreeTextEntry2] : 3/18/24 [Patient] : Patient [FreeTextEntry1] : Pt requests psychotherapy to focus on anhedonia, low motivation, feelings of "stuckness", processing his father's recent death, feelings of humiliation and guilt, perfectionism

## 2024-03-20 NOTE — DISCUSSION/SUMMARY
[Initial Plan] : Initial Plan [Able to manage surrounding demands and opportunities] : able to manage surrounding demands and opportunities [Able to exercise self-direction] : able to exercise self-direction [Able to set and pursue goals] : able to set and pursue goals [Articulate] : articulate [Adherent to treatment recommendations] : adherent to treatment recommendations [Cognitively intact] : cognitively intact [Good impulse control] : good impulse control [Attempting to realize their potential] : Attempting to realize their potential [Insightful] : insightful [Intelligent] : intelligent [Motivated to participate in treatment] : motivated to participate in treatment [Health literacy] : health literacy [Motivated to maintain or improve physical health] : motivated to maintain or improve physical health [Financially stable] : financially stable [In good health] : in good health [Part of a supportive marriage] : part of a supportive marriage [Has vocational interests or hobbies] : has vocational interests or hobbies [Housing stability] : housing stability [Part of a supportive family] : part of a supportive family [Civic organizations] : civic organizations [High level of education] : high level of education [English fluency] : English fluency [Connected to healthcare] : connected to healthcare [Access to safe outdoor spaces] : access to safe outdoor spaces [Physical Health] : Physical Health [Occupational/Educational] : Occupational/Educational [Continued - Progress made] : Continued - Progress made: [Interpersonal Relationships] : Interpersonal Relationships [every ___ months] : every [unfilled] months [Mental Health] : Mental Health [Initial] : Initial [every ___ weeks] : every [unfilled] weeks [___ times a week] : [unfilled] times a week [A change in level of care is needed as evidenced by:] : A change in level of care is needed as evidenced by: [None - Reason others did not participate:] : None - Reason others did not participate:  [Yes] : Yes [Therapist] : Therapist [FreeTextEntry2] : 2/1/24 [FreeTextEntry3] : 10/25/22 [FreeTextEntry9] : 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  [Tobacco Screening Completed?] : Tobacco Screening Completed: Yes

## 2024-03-20 NOTE — PHYSICAL EXAM
[Average] : average [Well groomed] : well groomed [Cooperative] : cooperative [Anxious] : anxious [Full] : full [Linear/Goal Directed] : linear/goal directed [Rapid] : rapid [WNL] : within normal limits [FreeTextEntry8] : frustrated [de-identified] : tearful at times

## 2024-03-25 ENCOUNTER — APPOINTMENT (OUTPATIENT)
Dept: PSYCHIATRY | Facility: CLINIC | Age: 59
End: 2024-03-25
Payer: MEDICAID

## 2024-03-25 PROCEDURE — 90834 PSYTX W PT 45 MINUTES: CPT | Mod: 59

## 2024-03-25 PROCEDURE — 90853 GROUP PSYCHOTHERAPY: CPT | Mod: 95

## 2024-03-25 PROCEDURE — 90834 PSYTX W PT 45 MINUTES: CPT

## 2024-03-25 NOTE — PLAN
[FreeTextEntry2] : treatment will focus on supporting pt achieving his goals getting work, improving his mood, socializing more, decreasing perfectionism, and increasing motivation. will revisit treatment plan and duration in August. [Psychodynamic Therapy] : Psychodynamic Therapy  [Psychoeducation] : Psychoeducation  [de-identified] : Pt arrived on time for session.  Session focused on exploring pt's difficulty re-engaging with his routine following his hand surgery, particularly his avoidance of exercise and job applications and the ways in which he feels like a failure. Explored his difficulty asking for help due to feeling like he should do things independently. Explored how this creates distance between him and his partner. Pt open to and appreciative of interventions. Session concluded with pt in good emotional control.  [Recommended Frequency of Visits: ____] : Recommended frequency of visits: [unfilled] [Return in ____ week(s)] : Return in [unfilled] week(s) [FreeTextEntry1] : continue twice weekly psychotherapy, medication management, modern masculinities group.

## 2024-03-25 NOTE — PHYSICAL EXAM
[Well groomed] : well groomed [Average] : average [Cooperative] : cooperative [Anxious] : anxious [Full] : full [Rapid] : rapid [Linear/Goal Directed] : linear/goal directed [WNL] : within normal limits [FreeTextEntry8] : frustrated [de-identified] : tearful at times

## 2024-03-26 NOTE — DISCUSSION/SUMMARY
[Once a week] : once a week [60 minutes] : 60 minutes [de-identified] : Modern Masculinities Group [FreeTextEntry8] : Number of patients in group: 4 1. Discuss/Explore issues regarding experiences arising from being socialized as male including the impact on relationships and vulnerability  2. Develop relational insight into pt's social impact on others and better understand patterns related to the impact of others on them. Learn/develop boundaries in social situations  3. Learn to identify and tolerate a wider range of emotions and develop language around the discussion of emotions.  4. Develop techniques to self regulate/self soothe in overwhelming circumstances, and how to learn to rely on others for co-regulation.  [de-identified] : Plan: continue twice weekly therapy, medication management, group therapy.  [FreeTextEntry4] : Pt arrived on time for session and participated throughout session. He was interested in engaging other group members and hearing how they were doing and encouraging and validating them. He discussed his feelings about another group member joining and feeling protective of not wanting to change or disrupt the experience of the group together. He engaged well in discussion around trying to "fix" another group members problems and struggling with how this was not as empathic as he had hoped. Left in good emotional/behavioral control.

## 2024-03-26 NOTE — REASON FOR VISIT
[Other:___] : due to [unfilled] [Continuing, patient seen in-person within last 12 months] : Telehealth services are continuing as patient has been seen in-person within last 12 months. [Telehealth (audio & video) - Individual/Group] : This visit was provided via telehealth using real-time 2-way audio visual technology. [Medical Office: ([facility])___] : The provider was located at the medical office in [unfilled]. [Home] : The patient, [unfilled], was located at home, [unfilled], at the time of the visit. [Verbal consent obtained from patient/other participant(s)] : Verbal consent for telehealth/telephonic services obtained from patient/other participant(s) [FreeTextEntry4] : 5pm [FreeTextEntry5] : 6pm

## 2024-03-27 ENCOUNTER — APPOINTMENT (OUTPATIENT)
Dept: PSYCHIATRY | Facility: CLINIC | Age: 59
End: 2024-03-27
Payer: MEDICAID

## 2024-03-27 ENCOUNTER — NON-APPOINTMENT (OUTPATIENT)
Age: 59
End: 2024-03-27

## 2024-03-27 PROCEDURE — 90834 PSYTX W PT 45 MINUTES: CPT | Mod: 95

## 2024-04-01 ENCOUNTER — APPOINTMENT (OUTPATIENT)
Dept: PSYCHIATRY | Facility: CLINIC | Age: 59
End: 2024-04-01

## 2024-04-01 ENCOUNTER — NON-APPOINTMENT (OUTPATIENT)
Age: 59
End: 2024-04-01

## 2024-04-01 NOTE — DISCUSSION/SUMMARY
[FreeTextEntry1] : Patient had to cancel today' appointment.  Will schedule for week after next with pt.

## 2024-04-08 ENCOUNTER — APPOINTMENT (OUTPATIENT)
Dept: PSYCHIATRY | Facility: CLINIC | Age: 59
End: 2024-04-08
Payer: MEDICAID

## 2024-04-08 PROCEDURE — 90834 PSYTX W PT 45 MINUTES: CPT | Mod: 59

## 2024-04-08 PROCEDURE — 90853 GROUP PSYCHOTHERAPY: CPT | Mod: 95

## 2024-04-08 NOTE — PLAN
[FreeTextEntry2] : treatment will focus on supporting pt achieving his goals getting work, improving his mood, socializing more, decreasing perfectionism, and increasing motivation. will revisit treatment plan and duration in August. [Psychodynamic Therapy] : Psychodynamic Therapy  [Psychoeducation] : Psychoeducation  [de-identified] : Pt arrived on time for session. Session focused on processing therapeutic relationship, particularly shifts in the frame such as writer's willingness to tie pt's shoe the previous week, as he was recovering from hand surgery. Explored his appreciation of writer's flexibility to do so and how appreciative he was generally. Explored his experience of writer holding him in mind in recent instances and the meaning the therapy holds for him and the sacrifices he's willing to make to maintain. Pt open to and appreciative of interventions. Session concluded with pt in good emotional control.  [Recommended Frequency of Visits: ____] : Recommended frequency of visits: [unfilled] [Return in ____ week(s)] : Return in [unfilled] week(s) [FreeTextEntry1] : continue twice weekly psychotherapy, medication management, modern masculinities group.

## 2024-04-08 NOTE — RISK ASSESSMENT
No [FreeTextEntry8] : pt denied thoughts of suicide [FreeTextEntry7] : no evidence of current violent or homicidal ideation

## 2024-04-08 NOTE — PHYSICAL EXAM
[Well groomed] : well groomed [Average] : average [Cooperative] : cooperative [Anxious] : anxious [Full] : full [Rapid] : rapid [Linear/Goal Directed] : linear/goal directed [WNL] : within normal limits [FreeTextEntry8] : frustrated [de-identified] : tearful at times

## 2024-04-10 ENCOUNTER — APPOINTMENT (OUTPATIENT)
Dept: PSYCHIATRY | Facility: CLINIC | Age: 59
End: 2024-04-10
Payer: MEDICAID

## 2024-04-10 ENCOUNTER — NON-APPOINTMENT (OUTPATIENT)
Age: 59
End: 2024-04-10

## 2024-04-10 PROCEDURE — 90834 PSYTX W PT 45 MINUTES: CPT | Mod: 95

## 2024-04-10 NOTE — DISCUSSION/SUMMARY
[Once a week] : once a week [60 minutes] : 60 minutes [de-identified] : Modern Masculinities Group [FreeTextEntry8] : Number of patients in group: 4 1. Discuss/Explore issues regarding experiences arising from being socialized as male including the impact on relationships and vulnerability  2. Develop relational insight into pt's social impact on others and better understand patterns related to the impact of others on them. Learn/develop boundaries in social situations  3. Learn to identify and tolerate a wider range of emotions and develop language around the discussion of emotions.  4. Develop techniques to self regulate/self soothe in overwhelming circumstances, and how to learn to rely on others for co-regulation.  [FreeTextEntry4] : Pt arrived on time for session and participated throughout session. Pt was open and welcoming to a new group member, facilitating introductions around the group and acknowledging gratitude for the openness of the new group member. He stated his interest in hearing about how other group members are doing and providing support and encouragement to them. Left in good emotional/behavioral control.  [de-identified] : Plan: continue twice weekly therapy, medication management, group therapy.

## 2024-04-10 NOTE — PLAN
[FreeTextEntry2] : treatment will focus on supporting pt achieving his goals getting work, improving his mood, socializing more, decreasing perfectionism, and increasing motivation. will revisit treatment plan and duration in August. [de-identified] : Pt arrived on time for session. Session focused on exploring pt's reactions to new pt in MM group. Pt reported increased productivity in past few days, particularly applying to jobs and becoming more responsive. Reports feeling "bored of watching tv all day," which has motivated him to engage. 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 Pt open to and appreciative of interventions. Session concluded with pt in good emotional control.  [FreeTextEntry1] : continue twice weekly psychotherapy, medication management, modern masculinities group.

## 2024-04-10 NOTE — REASON FOR VISIT
[FreeTextEntry4] : 3pm [FreeTextEntry5] : 345pm [FreeTextEntry2] : 3/18/24 [FreeTextEntry1] : Pt requests psychotherapy to focus on anhedonia, low motivation, feelings of "stuckness", processing his father's recent death, feelings of humiliation and guilt, perfectionism

## 2024-04-15 ENCOUNTER — APPOINTMENT (OUTPATIENT)
Dept: PSYCHIATRY | Facility: CLINIC | Age: 59
End: 2024-04-15

## 2024-04-17 ENCOUNTER — APPOINTMENT (OUTPATIENT)
Dept: PSYCHIATRY | Facility: CLINIC | Age: 59
End: 2024-04-17
Payer: MEDICAID

## 2024-04-17 DIAGNOSIS — J06.9 ACUTE UPPER RESPIRATORY INFECTION, UNSPECIFIED: ICD-10-CM

## 2024-04-17 PROCEDURE — 99215 OFFICE O/P EST HI 40 MIN: CPT | Mod: 95

## 2024-04-17 PROCEDURE — G2211 COMPLEX E/M VISIT ADD ON: CPT | Mod: NC,1L,95

## 2024-04-17 RX ORDER — BUPROPION HYDROCHLORIDE 450 MG/1
450 TABLET, FILM COATED, EXTENDED RELEASE ORAL
Qty: 90 | Refills: 2 | Status: ACTIVE | COMMUNITY
Start: 2023-05-15 | End: 1900-01-01

## 2024-04-17 RX ORDER — LAMOTRIGINE 100 MG/1
100 TABLET ORAL
Qty: 90 | Refills: 2 | Status: ACTIVE | COMMUNITY
Start: 2023-05-15 | End: 1900-01-01

## 2024-04-17 RX ORDER — FLUTICASONE FUROATE, UMECLIDINIUM BROMIDE AND VILANTEROL TRIFENATATE 100; 62.5; 25 UG/1; UG/1; UG/1
100-62.5-25 POWDER RESPIRATORY (INHALATION)
Refills: 0 | Status: ACTIVE | COMMUNITY
Start: 2024-04-17

## 2024-04-17 RX ORDER — ZOLPIDEM TARTRATE 10 MG/1
10 TABLET ORAL
Qty: 12 | Refills: 0 | Status: ACTIVE | COMMUNITY
Start: 2022-11-18 | End: 1900-01-01

## 2024-04-17 NOTE — PHYSICAL EXAM
[Average] : average [Cooperative] : cooperative [Anxious] : anxious [Constricted] : constricted [Clear] : clear [Linear/Goal Directed] : linear/goal directed [WNL] : within normal limits [No] : No [FreeTextEntry8] : mildly dysphoric [de-identified] : chronically fast paced in anxious way [Individual reports tobacco use during the last 30 days?] : Individual reports tobacco use during the last 30 days? No

## 2024-04-17 NOTE — DISCUSSION/SUMMARY
[Date of Last Physical Exam: _____] : Date of Last Physical Exam: [unfilled] [Date of Last Annual Labs: _____] : Date of Last Annual Labs: [unfilled] [Tobacco Screening Completed?] : Tobacco Screening Completed: Yes [Annual Review of Systems Completed?] : Annual Review of Systems Completed: Yes [FreeTextEntry1] : Patient is a 58 year old white male, currently unemployed, former executive in cosmetics industry, domiciled with , with reported psychiatric history of bipolar disorder diagnosed in 2016, with no IPP admissions, initially presented to clinic to re-establish psychiatric care after having last seen psychiatrist in 2017 for medication management as well as therapy, presenting today for follow-up. On initial evaluation, patient had presented with symptoms suggestive of Generalized Anxiety Disorder, but did not appear acutely depressed, suicidal, manic, or psychotic. While patient reported history of Bipolar 1 Disorder, he did not report any lifetime manic episodes and patient's medication regimen (Lamictal 25 mg daily) would not likely have been effective in treatment of Bipolar 1 Disorder.   Even so, he was having frequent mood instability that improved with titration upwards of Lamictal.     Dx: Bipolar Spectrum Disorder  Patient overall psych. stable awaiting hand surgery on March 12, 2024.

## 2024-04-17 NOTE — HISTORY OF PRESENT ILLNESS
[Not Applicable] : Not applicable [FreeTextEntry1] : Nilson seen remotely today.  Got strep throat Feb 8th and was sick for 2 weeks.  Is currently feeling better and spending time with friend who is in with wife from out of town.  Will be getting surgery on hand next Tuesday so trying to get as much done before he is somewhat out of commission.  [FreeTextEntry2] : Dx with bipolar disorder in 2016, however no clear manic episode reported in lifetime. No IPP admissions in lifetime, saw 3 prior psychiatrists, last time was in 2017 - PMD continued prescribing psychiatric medications since then.  [FreeTextEntry3] : was first started on medications in [id number]: wellbutrin ER 300mg, lamotrigine 25 mg daily (started at 50mg, then brought down to 25mg), zolpidem 10mg PRN (1/2 of 1 PRN, says 30 days lasts 3 months. Denies any other medication trials.

## 2024-04-17 NOTE — REASON FOR VISIT
[Other Location: e.g. Home (Enter Location, City,State)___] : The provider was located at [unfilled]. [Telehealth (audio & video) - Individual/Group] : This visit was provided via telehealth using real-time 2-way audio visual technology. [Home] : The patient, [unfilled], was located at home, [unfilled], at the time of the visit. [Verbal consent obtained from patient/other participant(s)] : Verbal consent for telehealth/telephonic services obtained from patient/other participant(s) [Patient] : Patient [FreeTextEntry4] : 1:08 pm [FreeTextEntry5] : 1:18 pm [FreeTextEntry2] : 2/24/23 [FreeTextEntry1] : follow up treatment of DIMITRI, insomnia and Bipolar spectrum disorder

## 2024-04-17 NOTE — PLAN
[Yes. details: ___] : Yes, [unfilled] [Medication education provided] : Medication education provided. [Rationale for medication choices, possible risks/precautions, benefits, alternative treatment choices, and consequences of non-treatment discussed] : Rationale for medication choices, possible risks/precautions, benefits, alternative treatment choices, and consequences of non-treatment discussed with patient/family/caregiver  [FreeTextEntry5] : Offered support/encouragement, psychoeducation, counseling regarding: diagnosis/diagnoses, medications, symptoms, recommendations etc.  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

## 2024-04-17 NOTE — SOCIAL HISTORY
[FreeTextEntry1] : Pt has bachelor's degree in history, worked in beauty/cosmetic industry for 30 years, worked at [employer] but currently unemployed. Works as election poll worker seasonally. , living with . Born [place] raised [place], moved to [place], then [place], then [place], then [place], then [place] in 2009. \par  Denies any abuse history.\par

## 2024-04-17 NOTE — SOCIAL HISTORY
[FreeTextEntry1] : Pt has bachelor's degree in history, worked in beauty/cosmetic industry for 30 years, worked at [employer] but currently unemployed. Works as election poll worker seasonally. , living with . Born [place] raised [place], moved to [place], then [place], then [place], then [place], then [place] in 2009. \par  Denies any abuse history.\par

## 2024-04-22 ENCOUNTER — APPOINTMENT (OUTPATIENT)
Dept: PSYCHIATRY | Facility: CLINIC | Age: 59
End: 2024-04-22
Payer: MEDICAID

## 2024-04-22 PROCEDURE — 90853 GROUP PSYCHOTHERAPY: CPT | Mod: 95

## 2024-04-22 PROCEDURE — 90834 PSYTX W PT 45 MINUTES: CPT | Mod: 59

## 2024-04-23 NOTE — DISCUSSION/SUMMARY
[Once a week] : once a week [60 minutes] : 60 minutes [de-identified] : [facility] [FreeTextEntry8] : Number of patients in group: 4 1. Discuss/Explore issues regarding experiences arising from being socialized as male including the impact on relationships and vulnerability  2. Develop relational insight into pt's social impact on others and better understand patterns related to the impact of others on them. Learn/develop boundaries in social situations  3. Learn to identify and tolerate a wider range of emotions and develop language around the discussion of emotions.  4. Develop techniques to self regulate/self soothe in overwhelming circumstances, and how to learn to rely on others for co-regulation.  [FreeTextEntry4] : Pt arrived on time for session and participated throughout session. He expressed his concern for other group members and invited them to share about how things had been going for them. He expressed empathy and appeared moved by content of other pts, particularly around their own struggles with depression. Left in good emotional/behavioral control.  [de-identified] : Plan: continue twice weekly therapy, medication management, group therapy.

## 2024-04-23 NOTE — PHYSICAL EXAM
[Well groomed] : well groomed [Average] : average [Cooperative] : cooperative [Anxious] : anxious [Full] : full [Rapid] : rapid [Linear/Goal Directed] : linear/goal directed [WNL] : within normal limits [FreeTextEntry8] : frustrated [de-identified] : tearful at times

## 2024-04-23 NOTE — REASON FOR VISIT
[Continuing, patient seen in-person within last 12 months] : Telehealth services are continuing as patient has been seen in-person within last 12 months. [Telehealth (audio & video) - Individual/Group] : This visit was provided via telehealth using real-time 2-way audio visual technology. [Other Location: e.g. Home (Enter Location, City,State)___] : The provider was located at [unfilled]. [Home] : The patient, [unfilled], was located at home, [unfilled], at the time of the visit. [Verbal consent obtained from patient/other participant(s)] : Verbal consent for telehealth/telephonic services obtained from patient/other participant(s) [FreeTextEntry4] : 3pm [FreeTextEntry5] : 345pm [FreeTextEntry2] : 3/18/24 [Patient] : Patient [FreeTextEntry1] : Pt requests psychotherapy to focus on anhedonia, low motivation, feelings of "stuckness", processing his father's death, feelings of humiliation and guilt, perfectionism

## 2024-04-23 NOTE — REASON FOR VISIT
[Other:___] : due to [unfilled] [Continuing, patient seen in-person within last 12 months] : Telehealth services are continuing as patient has been seen in-person within last 12 months. [Telehealth (audio & video) - Individual/Group] : This visit was provided via telehealth using real-time 2-way audio visual technology. [Medical Office: ([facility])___] : The provider was located at the medical office in [unfilled]. [Home] : The patient, [unfilled], was located at home, [unfilled], at the time of the visit. [Verbal consent obtained from patient/other participant(s)] : Verbal consent for telehealth/telephonic services obtained from patient/other participant(s) [FreeTextEntry4] : 5pm [FreeTextEntry5] : 6pm

## 2024-04-23 NOTE — PLAN
[Psychodynamic Therapy] : Psychodynamic Therapy  [Psychoeducation] : Psychoeducation  [Recommended Frequency of Visits: ____] : Recommended frequency of visits: [unfilled] [Return in ____ week(s)] : Return in [unfilled] week(s) [FreeTextEntry2] : treatment will focus on supporting pt achieving his goals getting work, improving his mood, socializing more, decreasing perfectionism, and increasing motivation. will revisit treatment plan and duration in August. [de-identified] : Pt arrived on time for session. Session focused on exploring the pt's experience of separation from writer for past two weeks, and his appreciation for meeting again. Explored his progress in his hand surgery recovery, discovery of ventral hernia that has caused significant discomfort, ongoing internal conflict around his weight, and conflict around sex and his history with sex. Processed the vulnerability of discussing sex in session. Pt open to and appreciative of interventions. Session concluded with pt in good emotional control.  [FreeTextEntry1] : continue twice weekly psychotherapy, medication management, modern masculinities group.

## 2024-04-24 ENCOUNTER — APPOINTMENT (OUTPATIENT)
Dept: PSYCHIATRY | Facility: CLINIC | Age: 59
End: 2024-04-24
Payer: MEDICAID

## 2024-04-24 PROCEDURE — 90834 PSYTX W PT 45 MINUTES: CPT | Mod: 95

## 2024-04-24 NOTE — REASON FOR VISIT
[Continuing, patient seen in-person within last 12 months] : Telehealth services are continuing as patient has been seen in-person within last 12 months. [Telehealth (audio & video) - Individual/Group] : This visit was provided via telehealth using real-time 2-way audio visual technology. [Other Location: e.g. Home (Enter Location, City,State)___] : The provider was located at [unfilled]. [Home] : The patient, [unfilled], was located at home, [unfilled], at the time of the visit. [Verbal consent obtained from patient/other participant(s)] : Verbal consent for telehealth/telephonic services obtained from patient/other participant(s) [FreeTextEntry4] : 3pm [FreeTextEntry5] : 345pm [FreeTextEntry2] : 3/18/24 [Patient] : Patient [FreeTextEntry1] : Pt requests psychotherapy to focus on anhedonia, low motivation, feelings of "stuckness", processing his father's recent death, feelings of humiliation and guilt, perfectionism

## 2024-04-24 NOTE — PLAN
[FreeTextEntry2] : treatment will focus on supporting pt achieving his goals getting work, improving his mood, socializing more, decreasing perfectionism, and increasing motivation. will revisit treatment plan and duration in August. [Psychodynamic Therapy] : Psychodynamic Therapy  [Psychoeducation] : Psychoeducation  [de-identified] : Pt arrived on time for session.  Session focused on processing pt's experience in recent modern masculinities group. Explored his feelings of pride in the way he engaged another group member and was able to support that person. Also explored his disclosure about the extent of his depression and his recognition of the role it played in facilitating connection within the group. Explored pt's discomfort with his weight and his preoccupation with BMI. Writer provided psychoeducation on the racist, capitalist history of BMI. Pt open to and appreciative of interventions. Session concluded with pt in good emotional control.  [Recommended Frequency of Visits: ____] : Recommended frequency of visits: [unfilled] [Return in ____ week(s)] : Return in [unfilled] week(s) [FreeTextEntry1] : continue twice weekly psychotherapy, medication management, modern masculinities group.

## 2024-04-24 NOTE — PHYSICAL EXAM
[Well groomed] : well groomed [Average] : average [Cooperative] : cooperative [Anxious] : anxious [Full] : full [Rapid] : rapid [Linear/Goal Directed] : linear/goal directed [WNL] : within normal limits [FreeTextEntry8] : frustrated [de-identified] : tearful at times

## 2024-04-29 ENCOUNTER — APPOINTMENT (OUTPATIENT)
Dept: PSYCHIATRY | Facility: CLINIC | Age: 59
End: 2024-04-29
Payer: MEDICAID

## 2024-04-29 PROCEDURE — 90834 PSYTX W PT 45 MINUTES: CPT

## 2024-04-29 PROCEDURE — 90853 GROUP PSYCHOTHERAPY: CPT | Mod: 95

## 2024-04-29 PROCEDURE — 90853 GROUP PSYCHOTHERAPY: CPT | Mod: 59,95

## 2024-04-29 PROCEDURE — 90834 PSYTX W PT 45 MINUTES: CPT | Mod: 59

## 2024-04-29 NOTE — PLAN
[FreeTextEntry2] : treatment will focus on supporting pt achieving his goals getting work, improving his mood, socializing more, decreasing perfectionism, and increasing motivation. will revisit treatment plan and duration in August. [Psychodynamic Therapy] : Psychodynamic Therapy  [Psychoeducation] : Psychoeducation  [de-identified] : Pt arrived on time for session. Session focused on exploring dynamics related to pt's needs for acceptance and validation. Explored how this came up with his partner as well as the ways in which it comes up in the therapeutic relationship. Pt open to and appreciative of interventions. Session concluded with pt in good emotional control.  [Recommended Frequency of Visits: ____] : Recommended frequency of visits: [unfilled] [Return in ____ week(s)] : Return in [unfilled] week(s) [FreeTextEntry1] : continue twice weekly psychotherapy, medication management, modern masculinities group.

## 2024-04-29 NOTE — PHYSICAL EXAM
[Well groomed] : well groomed [Average] : average [Cooperative] : cooperative [Anxious] : anxious [Full] : full [Rapid] : rapid [Linear/Goal Directed] : linear/goal directed [WNL] : within normal limits [FreeTextEntry8] : frustrated [de-identified] : tearful at times

## 2024-04-29 NOTE — REASON FOR VISIT
[Patient] : Patient [FreeTextEntry1] : Pt requests psychotherapy to focus on anhedonia, low motivation, feelings of "stuckness", processing his father's death, feelings of humiliation and guilt, perfectionism

## 2024-04-30 NOTE — REASON FOR VISIT
[Other:___] : due to [unfilled] [Continuing, patient seen in-person within last 12 months] : Telehealth services are continuing as patient has been seen in-person within last 12 months. [Telehealth (audio & video) - Individual/Group] : This visit was provided via telehealth using real-time 2-way audio visual technology. [Medical Office: ([facility])___] : The provider was located at the medical office in [unfilled]. [Home] : The patient, [unfilled], was located at home, [unfilled], at the time of the visit. [Verbal consent obtained from patient/other participant(s)] : Verbal consent for telehealth/telephonic services obtained from patient/other participant(s) [FreeTextEntry4] : 5pm [FreeTextEntry5] : 6pm

## 2024-04-30 NOTE — DISCUSSION/SUMMARY
[Once a week] : once a week [60 minutes] : 60 minutes [de-identified] : [facility] [FreeTextEntry8] : Number of patients in group: 3 1. Discuss/Explore issues regarding experiences arising from being socialized as male including the impact on relationships and vulnerability  2. Develop relational insight into pt's social impact on others and better understand patterns related to the impact of others on them. Learn/develop boundaries in social situations  3. Learn to identify and tolerate a wider range of emotions and develop language around the discussion of emotions.  4. Develop techniques to self regulate/self soothe in overwhelming circumstances, and how to learn to rely on others for co-regulation.  [FreeTextEntry4] : Pt arrived on time for session and participated throughout session. He was supportive and active in checking in with and providing empathy to a group member who had lost his father. He also shared about his own experience losing his father and was able to identify with various aspects of the experience.  Left in good emotional/behavioral control.  [de-identified] : Plan: continue twice weekly therapy, medication management, group therapy.

## 2024-05-01 ENCOUNTER — APPOINTMENT (OUTPATIENT)
Dept: PSYCHIATRY | Facility: CLINIC | Age: 59
End: 2024-05-01
Payer: MEDICAID

## 2024-05-01 PROCEDURE — 90834 PSYTX W PT 45 MINUTES: CPT | Mod: 95

## 2024-05-01 NOTE — PLAN
[FreeTextEntry2] : treatment will focus on supporting pt achieving his goals getting work, improving his mood, socializing more, decreasing perfectionism, and increasing motivation. will revisit treatment plan and duration in August. [Psychodynamic Therapy] : Psychodynamic Therapy  [Psychoeducation] : Psychoeducation  [de-identified] : Pt arrived on time for session.  Session focused on exploring pt's experience of group on Monday, particularly his attempts to restrain his emotionality due to feeling like it was too much or could not be appreciated by other group members. Explored feelings of aloneness in supporting another group member and his wish for others to help him with that support, as he felt he was holding all the sadness by himself. Explored how this aloneness is a major theme throughout his life. Pt open to and appreciative of interventions. Session concluded with pt in good emotional control.  [Recommended Frequency of Visits: ____] : Recommended frequency of visits: [unfilled] [Return in ____ week(s)] : Return in [unfilled] week(s) [FreeTextEntry1] : continue twice weekly psychotherapy, medication management, modern masculinities group.

## 2024-05-01 NOTE — PHYSICAL EXAM
[Well groomed] : well groomed [Average] : average [Cooperative] : cooperative [Anxious] : anxious [Full] : full [Rapid] : rapid [Linear/Goal Directed] : linear/goal directed [WNL] : within normal limits [FreeTextEntry8] : frustrated [de-identified] : tearful at times

## 2024-05-06 ENCOUNTER — APPOINTMENT (OUTPATIENT)
Dept: PSYCHIATRY | Facility: CLINIC | Age: 59
End: 2024-05-06
Payer: MEDICAID

## 2024-05-06 PROCEDURE — 90834 PSYTX W PT 45 MINUTES: CPT | Mod: 59

## 2024-05-06 PROCEDURE — 90853 GROUP PSYCHOTHERAPY: CPT | Mod: 95

## 2024-05-06 NOTE — PLAN
[FreeTextEntry2] : treatment will focus on supporting pt achieving his goals getting work, improving his mood, socializing more, decreasing perfectionism, and increasing motivation. will revisit treatment plan and duration in August. [Psychodynamic Therapy] : Psychodynamic Therapy  [Psychoeducation] : Psychoeducation  [de-identified] : Pt arrived on time for session. Session focused on processing pt's complications related to a recent wrist/thumb surgery, and the frustrations and pain caused as a result. Also explored pt's preoccupation with his weight and how it may pertain to avoidance of engagement with work and the outside world. Pt open to and appreciative of interventions. Session concluded with pt in good emotional control.  [Recommended Frequency of Visits: ____] : Recommended frequency of visits: [unfilled] [Return in ____ week(s)] : Return in [unfilled] week(s) [FreeTextEntry1] : continue twice weekly psychotherapy, medication management, modern masculinities group.

## 2024-05-06 NOTE — PHYSICAL EXAM
[Well groomed] : well groomed [Average] : average [Cooperative] : cooperative [Anxious] : anxious [Full] : full [Rapid] : rapid [Linear/Goal Directed] : linear/goal directed [WNL] : within normal limits [FreeTextEntry8] : frustrated [de-identified] : tearful at times

## 2024-05-08 ENCOUNTER — APPOINTMENT (OUTPATIENT)
Dept: PSYCHIATRY | Facility: CLINIC | Age: 59
End: 2024-05-08
Payer: MEDICAID

## 2024-05-08 PROCEDURE — 90834 PSYTX W PT 45 MINUTES: CPT | Mod: 95

## 2024-05-08 NOTE — PLAN
[FreeTextEntry2] : treatment will focus on supporting pt achieving his goals getting work, improving his mood, socializing more, decreasing perfectionism, and increasing motivation. will revisit treatment plan and duration in August. [Psychodynamic Therapy] : Psychodynamic Therapy  [Psychoeducation] : Psychoeducation  [de-identified] : Pt arrived on time for session.  Session focused on exploring pt's experience of group on Monday, particularly how he felt alone in trying to maintain group atttendance, and not feeling supported by other group members. Explored how this aloneness is a major theme throughout his life. Explored pt's frustration with writer for various dynamics. Pt open to and appreciative of interventions. Session concluded with pt in good emotional control.  [Recommended Frequency of Visits: ____] : Recommended frequency of visits: [unfilled] [Return in ____ week(s)] : Return in [unfilled] week(s) [FreeTextEntry1] : continue twice weekly psychotherapy, medication management, modern masculinities group.

## 2024-05-08 NOTE — PHYSICAL EXAM
[Well groomed] : well groomed [Average] : average [Cooperative] : cooperative [Anxious] : anxious [Full] : full [Rapid] : rapid [Linear/Goal Directed] : linear/goal directed [WNL] : within normal limits [de-identified] : tearful at times [FreeTextEntry8] : frustrated

## 2024-05-13 ENCOUNTER — APPOINTMENT (OUTPATIENT)
Dept: PSYCHIATRY | Facility: CLINIC | Age: 59
End: 2024-05-13
Payer: MEDICAID

## 2024-05-13 PROCEDURE — 90853 GROUP PSYCHOTHERAPY: CPT | Mod: GT,95

## 2024-05-13 PROCEDURE — 90834 PSYTX W PT 45 MINUTES: CPT | Mod: 59

## 2024-05-13 NOTE — PLAN
[FreeTextEntry2] : treatment will focus on supporting pt achieving his goals getting work, improving his mood, socializing more, decreasing perfectionism, and increasing motivation. will revisit treatment plan and duration in August. [Psychodynamic Therapy] : Psychodynamic Therapy  [Psychoeducation] : Psychoeducation  [de-identified] : Pt arrived on time for session. Session focused on exploring pt's patterns of avoidance around re-engaging and behavioral activation, particularly as it relates to volunteer work or job opportunities. Explored his tendency to "get ahead of himself" in planning for opportunities that have not emerged. Pt open to and appreciative of interventions. Session concluded with pt in good emotional control.  [Recommended Frequency of Visits: ____] : Recommended frequency of visits: [unfilled] [Return in ____ week(s)] : Return in [unfilled] week(s) [FreeTextEntry1] : continue twice weekly psychotherapy, medication management, modern masculinities group.

## 2024-05-13 NOTE — REASON FOR VISIT
[Other:___] : due to [unfilled] [Continuing, patient seen in-person within last 12 months] : Telehealth services are continuing as patient has been seen in-person within last 12 months. [Telehealth (audio & video) - Individual/Group] : This visit was provided via telehealth using real-time 2-way audio visual technology. [Medical Office: ([facility])___] : The provider was located at the medical office in [unfilled]. [Verbal consent obtained from patient/other participant(s)] : Verbal consent for telehealth/telephonic services obtained from patient/other participant(s) [Home] : The patient, [unfilled], was located at home, [unfilled], at the time of the visit. [FreeTextEntry4] : 5pm [FreeTextEntry5] : 6pm

## 2024-05-13 NOTE — PHYSICAL EXAM
[Well groomed] : well groomed [Average] : average [Cooperative] : cooperative [Anxious] : anxious [Full] : full [Rapid] : rapid [Linear/Goal Directed] : linear/goal directed [WNL] : within normal limits [FreeTextEntry8] : frustrated [de-identified] : tearful at times

## 2024-05-13 NOTE — DISCUSSION/SUMMARY
[Once a week] : once a week [60 minutes] : 60 minutes [de-identified] : [facility]cul[facility] Group [FreeTextEntry8] : Number of patients in group: 3 1. Discuss/Explore issues regarding experiences arising from being socialized as male including the impact on relationships and vulnerability  2. Develop relational insight into pt's social impact on others and better understand patterns related to the impact of others on them. Learn/develop boundaries in social situations  3. Learn to identify and tolerate a wider range of emotions and develop language around the discussion of emotions.  4. Develop techniques to self regulate/self soothe in overwhelming circumstances, and how to learn to rely on others for co-regulation.  [FreeTextEntry4] : Pt arrived on time for session and participated throughout session. He expressed concern and curiosity about the wellbeing of other group members, including those not present. He provided empathy and support to other group members and shared relevant stories that related well to the difficulties of other group members. He shared about his professional aspirations as well. Left in good emotional/behavioral control.  [de-identified] : Plan: continue twice weekly therapy, medication management, group therapy.

## 2024-05-15 ENCOUNTER — APPOINTMENT (OUTPATIENT)
Dept: PSYCHIATRY | Facility: CLINIC | Age: 59
End: 2024-05-15

## 2024-05-20 ENCOUNTER — APPOINTMENT (OUTPATIENT)
Dept: PSYCHIATRY | Facility: CLINIC | Age: 59
End: 2024-05-20
Payer: MEDICAID

## 2024-05-20 PROCEDURE — 90853 GROUP PSYCHOTHERAPY: CPT | Mod: 95

## 2024-05-20 PROCEDURE — 90834 PSYTX W PT 45 MINUTES: CPT | Mod: 59

## 2024-05-20 NOTE — PLAN
[FreeTextEntry2] : treatment will focus on supporting pt achieving his goals getting work, improving his mood, socializing more, decreasing perfectionism, and increasing motivation. will revisit treatment plan and duration in August. [Psychodynamic Therapy] : Psychodynamic Therapy  [Psychoeducation] : Psychoeducation  [de-identified] : Pt arrived on time for session. Session focused on exploring pt's transference with writer. Explored his relationship with a close friend and his sense that could offer mentalization in that friend's relationship with his  and the sense of self-worth pt is able to derive from this. Processed pt's feelings around writer's upcoming vacation. Pt open to and appreciative of interventions. Session concluded with pt in good emotional control.  [Recommended Frequency of Visits: ____] : Recommended frequency of visits: [unfilled] [Return in ____ week(s)] : Return in [unfilled] week(s) [FreeTextEntry1] : continue twice weekly psychotherapy, medication management, modern masculinities group.

## 2024-05-20 NOTE — PHYSICAL EXAM
[Well groomed] : well groomed [Average] : average [Cooperative] : cooperative [Anxious] : anxious [Full] : full [Rapid] : rapid [Linear/Goal Directed] : linear/goal directed [WNL] : within normal limits [FreeTextEntry8] : frustrated [de-identified] : tearful at times

## 2024-05-22 ENCOUNTER — APPOINTMENT (OUTPATIENT)
Dept: PSYCHIATRY | Facility: CLINIC | Age: 59
End: 2024-05-22

## 2024-05-29 ENCOUNTER — APPOINTMENT (OUTPATIENT)
Dept: PSYCHIATRY | Facility: CLINIC | Age: 59
End: 2024-05-29
Payer: MEDICAID

## 2024-05-29 DIAGNOSIS — G47.00 INSOMNIA, UNSPECIFIED: ICD-10-CM

## 2024-05-29 DIAGNOSIS — F41.1 GENERALIZED ANXIETY DISORDER: ICD-10-CM

## 2024-05-29 PROCEDURE — G2211 COMPLEX E/M VISIT ADD ON: CPT | Mod: NC,1L,95

## 2024-05-29 PROCEDURE — 99214 OFFICE O/P EST MOD 30 MIN: CPT | Mod: 95

## 2024-05-29 NOTE — SOCIAL HISTORY
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 , living with . Born [place] raised [place], moved to [place], then [place], then [place], then [place], then [place] in 2009. \par  Denies any abuse history.\par

## 2024-05-29 NOTE — REVIEW OF SYSTEMS
[FreeTextEntry6] : recovering from URI [de-identified] : mildly dysphoric [Negative] : Allergic/Immunologic

## 2024-05-29 NOTE — HISTORY OF PRESENT ILLNESS
[FreeTextEntry1] : Initially presented to [facility] for continued treatment of BIpolar Disorder unspecified and anxiety.  [FreeTextEntry2] : Dx with bipolar disorder in 2016, however no clear manic episode reported in lifetime. No IPP admissions in lifetime, saw 3 prior psychiatrists, last time was in 2017 - PMD continued prescribing psychiatric medications since then.  [FreeTextEntry3] : was first started on medications in 2282-1094: wellbutrin ER 300mg, lamotrigine 25 mg daily (started at 50mg, then brought down to 25mg), zolpidem 10mg PRN (1/2 of 1 PRN, says 30 days lasts 3 months. Denies any other medication trials.

## 2024-05-29 NOTE — REASON FOR VISIT
[FreeTextEntry4] : 10:10 am [FreeTextEntry5] : 10:30 am [FreeTextEntry2] : 2/24/23 [FreeTextEntry1] : follow up treatment of DIMITRI, insomnia and Bipolar spectrum disorder

## 2024-05-29 NOTE — REASON FOR VISIT
[FreeTextEntry4] : 10:07 am [FreeTextEntry5] : 10:30 am [FreeTextEntry2] : 2/24/23 [FreeTextEntry1] : follow up treatment of DIMITRI, insomnia and Bipolar spectrum disorder [Telehealth (audio & video) - Individual/Group] : This visit was provided via telehealth using real-time 2-way audio visual technology. [Other Location: e.g. Home (Enter Location, City,State)___] : The provider was located at [unfilled]. [Home] : The patient, [unfilled], was located at home, [unfilled], at the time of the visit. [Verbal consent obtained from patient/other participant(s)] : Verbal consent for telehealth/telephonic services obtained from patient/other participant(s) [Patient] : Patient

## 2024-05-29 NOTE — HISTORY OF PRESENT ILLNESS
[FreeTextEntry1] : Initially presented to [facility] for continued treatment of BIpolar Disorder unspecified and anxiety.  [FreeTextEntry2] : Dx with bipolar disorder in 2016, however no clear manic episode reported in lifetime. No IPP admissions in lifetime, saw 3 prior psychiatrists, last time was in 2017 - PMD continued prescribing psychiatric medications since then.  [FreeTextEntry3] : was first started on medications in 3113-1979: wellbutrin ER 300mg, lamotrigine 25 mg daily (started at 50mg, then brought down to 25mg), zolpidem 10mg PRN (1/2 of 1 PRN, says 30 days lasts 3 months. Denies any other medication trials.  [Not Applicable] : Not applicable

## 2024-05-29 NOTE — DISCUSSION/SUMMARY
[FreeTextEntry1] : Patient is a 58 year old white male, currently unemployed, former executive in cosmetics industry, domiciled with , with reported psychiatric history of bipolar disorder diagnosed in 2016, with no IPP admissions, initially presented to clinic to re-establish psychiatric care after having last seen psychiatrist in 2017 for medication management as well as therapy, presenting today for follow-up. On initial evaluation, patient had presented with symptoms suggestive of Generalized Anxiety Disorder, but did not appear acutely depressed, suicidal, manic, or psychotic. While patient reported history of Bipolar 1 Disorder, he did not report any lifetime manic episodes and patient's medication regimen at presentation (Lamictal 25 mg daily) would not likely have been effective in treatment of Bipolar 1 Disorder.   Even so, he was having frequent mood instability that improved with titration upwards of Lamictal so a Bipolar 2 or unspecified possible     Dx: Bipolar Spectrum Disorder  Patient overall psychiatrically stable with low level anxiety and minimal to mild dysphoria.

## 2024-05-29 NOTE — DISCUSSION/SUMMARY
[FreeTextEntry1] : Patient is a 58 year old white male, currently unemployed, former executive in cosmetics industry, domiciled with , with reported psychiatric history of bipolar disorder diagnosed in 2016, with no IPP admissions, initially presented to clinic to re-establish psychiatric care after having last seen psychiatrist in 2017 for medication management as well as therapy, presenting today for follow-up. On initial evaluation, patient had presented with symptoms suggestive of Generalized Anxiety Disorder, but did not appear acutely depressed, suicidal, manic, or psychotic. While patient reported history of Bipolar 1 Disorder, he did not report any lifetime manic episodes and patient's medication regimen at presentation (Lamictal 25 mg daily) would not likely have been effective in treatment of Bipolar 1 Disorder.   Even so, he was having frequent mood instability that improved with titration upwards of Lamictal so a Bipolar 2 or unspecified possible     Dx: Bipolar Spectrum Disorder  Patient overall psychiatrically stable with low level anxiety and minimal to mild dysphoria.      [Date of Last Physical Exam: _____] : Date of Last Physical Exam: [unfilled] [Date of Last Annual Labs: _____] : Date of Last Annual Labs: [unfilled] [Annual Review of Systems Completed?] : Annual Review of Systems Completed: Yes [Tobacco Screening Completed?] : Tobacco Screening Completed: Yes

## 2024-05-29 NOTE — PHYSICAL EXAM
[FreeTextEntry8] : minimal dysphoria [de-identified] : chronically fast paced in anxious way [Individual reports tobacco use during the last 30 days?] : Individual reports tobacco use during the last 30 days? No [Average] : average [Cooperative] : cooperative [Anxious] : anxious [Full] : full [Clear] : clear [Linear/Goal Directed] : linear/goal directed [WNL] : within normal limits [No] : No

## 2024-05-29 NOTE — PHYSICAL EXAM
[FreeTextEntry8] : minimal dysphoria [de-identified] : chronically fast paced in anxious way [Individual reports tobacco use during the last 30 days?] : Individual reports tobacco use during the last 30 days? No

## 2024-05-29 NOTE — PLAN
[FreeTextEntry5] : Offered support/encouragement, psychoeducation, counseling regarding: diagnosis/diagnoses, medications, symptoms, recommendations etc.  Reviewed/discussed plan below:  -Continue Bupropion XL at 450 mg po daily  -May take hydroxyzine 25 mg prn and taking sporadically; infrequently requiring Ambien -Continue Lamictal at 100 mg daily -Continue therapy (is doing twice weekly with Yrn); writer may collaborate with pt's therapist prn -To continue Men's group, Mondays at 5 pm -Contact writer prn  30 minutes spent reviewing prior records/notes, seeing patient and recording session note

## 2024-05-29 NOTE — PLAN
[FreeTextEntry5] : Offered support/encouragement, psychoeducation, counseling regarding: diagnosis/diagnoses, medications, symptoms, recommendations etc.  Reviewed/discussed plan below:  -Continue Bupropion XL at 450 mg po daily  -May take hydroxyzine 25 mg prn and taking sporadically; infrequently requiring Ambien -Continue Lamictal at 100 mg daily -Continue weekly therapy and writer may collaborate with pt's therapist prn -To continue Men's group, Mondays at 5 pm -Contact writer prn  45 minutes spent reviewing prior records/notes, seeing patient and recording session note  [Yes. details: ___] : Yes, [unfilled] [Medication education provided] : Medication education provided. [Rationale for medication choices, possible risks/precautions, benefits, alternative treatment choices, and consequences of non-treatment discussed] : Rationale for medication choices, possible risks/precautions, benefits, alternative treatment choices, and consequences of non-treatment discussed with patient/family/caregiver

## 2024-06-03 ENCOUNTER — APPOINTMENT (OUTPATIENT)
Dept: PSYCHIATRY | Facility: CLINIC | Age: 59
End: 2024-06-03
Payer: MEDICAID

## 2024-06-03 PROCEDURE — 90853 GROUP PSYCHOTHERAPY: CPT

## 2024-06-03 PROCEDURE — 90834 PSYTX W PT 45 MINUTES: CPT | Mod: 59

## 2024-06-03 NOTE — REASON FOR VISIT
[Other:___] : due to [unfilled] [Continuing, patient seen in-person within last 12 months] : Telehealth services are continuing as patient has been seen in-person within last 12 months. [Telehealth (audio & video) - Individual/Group] : This visit was provided via telehealth using real-time 2-way audio visual technology. [Medical Office: ([facility])___] : The provider was located at the medical office in [unfilled]. [Home] : The patient, [unfilled], was located at home, [unfilled], at the time of the visit. [Verbal consent obtained from patient/other participant(s)] : Verbal consent for telehealth/telephonic services obtained from patient/other participant(s) [FreeTextEntry4] : 5pm [FreeTextEntry5] : 6pm

## 2024-06-03 NOTE — DISCUSSION/SUMMARY
[Once a week] : once a week [60 minutes] : 60 minutes [de-identified] : [facility] [FreeTextEntry8] : Number of patients in group: 4 1. Discuss/Explore issues regarding experiences arising from being socialized as male including the impact on relationships and vulnerability  2. Develop relational insight into pt's social impact on others and better understand patterns related to the impact of others on them. Learn/develop boundaries in social situations  3. Learn to identify and tolerate a wider range of emotions and develop language around the discussion of emotions.  4. Develop techniques to self regulate/self soothe in overwhelming circumstances, and how to learn to rely on others for co-regulation.  [FreeTextEntry4] : Pt arrived on time for session and participated throughout session. He provided empathy and support to other group members. He shared about his experiences making new friends and was eager to hear about how other group members were doing. He also encouraged other group members and provided acknowledgement of changes that he had seen in them. He described the tricky risk and reward of vulnerability in his experience. Left in good emotional/behavioral control.  [de-identified] : Plan: continue twice weekly therapy, medication management, group therapy.

## 2024-06-03 NOTE — PHYSICAL EXAM
[Well groomed] : well groomed [Average] : average [Cooperative] : cooperative [Anxious] : anxious [Full] : full [Rapid] : rapid [Linear/Goal Directed] : linear/goal directed [WNL] : within normal limits [FreeTextEntry8] : frustrated [de-identified] : tearful at times

## 2024-06-03 NOTE — PLAN
[FreeTextEntry2] : treatment will focus on supporting pt achieving his goals getting work, improving his mood, socializing more, decreasing perfectionism, and increasing motivation. will revisit treatment plan and duration in August. [Psychodynamic Therapy] : Psychodynamic Therapy  [Psychoeducation] : Psychoeducation  [de-identified] : Pt arrived on time for session. Session focused on processing pt's feelings around writer's vacation. Also discussed writer's recommendation that pt engage in some volunteer work to increase social/community engagement as well as to prompt further material for psychotherapy and increase responsibility. Pt stated he would consider it. Pt alluded to 6/3 being his late mother's birthday and agreed to discuss further in next session. Pt open to and appreciative of interventions. Session concluded with pt in good emotional control.  [Recommended Frequency of Visits: ____] : Recommended frequency of visits: [unfilled] [Return in ____ week(s)] : Return in [unfilled] week(s) [FreeTextEntry1] : continue twice weekly psychotherapy, medication management, modern masculinities group.

## 2024-06-05 ENCOUNTER — APPOINTMENT (OUTPATIENT)
Dept: PSYCHIATRY | Facility: CLINIC | Age: 59
End: 2024-06-05
Payer: MEDICAID

## 2024-06-05 PROCEDURE — 90834 PSYTX W PT 45 MINUTES: CPT | Mod: 95

## 2024-06-05 NOTE — PHYSICAL EXAM
[Well groomed] : well groomed [Average] : average [Cooperative] : cooperative [Anxious] : anxious [Full] : full [Rapid] : rapid [Linear/Goal Directed] : linear/goal directed [WNL] : within normal limits [FreeTextEntry8] : frustrated [de-identified] : tearful at times

## 2024-06-05 NOTE — PLAN
[FreeTextEntry2] : treatment will focus on supporting pt achieving his goals getting work, improving his mood, socializing more, decreasing perfectionism, and increasing motivation. will revisit treatment plan and duration in August. [Psychodynamic Therapy] : Psychodynamic Therapy  [Psychoeducation] : Psychoeducation  [de-identified] : Pt arrived on time for session.  Session focused on processing pt's feelings around his mother's recent birthday, as she had  by suicide many years ago. Also processed a meaningful interpersonal interaction with a friend that left an impact on pt. Explored the juxtaposition of these two topics and the interpersonal meaning with writer, as well as pt's hurt feelings at writer's attention. Pt open to and appreciative of interventions. Session concluded with pt in good emotional control.  [Recommended Frequency of Visits: ____] : Recommended frequency of visits: [unfilled] [Return in ____ week(s)] : Return in [unfilled] week(s) [FreeTextEntry1] : continue twice weekly psychotherapy, medication management, modern masculinities group.

## 2024-06-10 ENCOUNTER — APPOINTMENT (OUTPATIENT)
Dept: PSYCHIATRY | Facility: CLINIC | Age: 59
End: 2024-06-10
Payer: MEDICAID

## 2024-06-10 PROCEDURE — 90834 PSYTX W PT 45 MINUTES: CPT | Mod: 59

## 2024-06-10 PROCEDURE — 90853 GROUP PSYCHOTHERAPY: CPT | Mod: 95

## 2024-06-10 NOTE — PLAN
[FreeTextEntry2] : treatment will focus on supporting pt achieving his goals getting work, improving his mood, socializing more, decreasing perfectionism, and increasing motivation. will revisit treatment plan and duration in August. [Psychodynamic Therapy] : Psychodynamic Therapy  [Psychoeducation] : Psychoeducation  [de-identified] : Pt arrived on time for session. Session focused on exploring pt's ambivalence around volunteer work and the way it relates to feelings of overwhelm due to dynamics with his  amongst other issues. Explored pt's fears of "decompensating" if he were to work at a job less prestigious than he had previously, amidst fears of humiliation. Pt open to and appreciative of interventions. Session concluded with pt in good emotional control.  [Recommended Frequency of Visits: ____] : Recommended frequency of visits: [unfilled] [Return in ____ week(s)] : Return in [unfilled] week(s) [FreeTextEntry1] : continue twice weekly psychotherapy, medication management, modern masculinities group.

## 2024-06-10 NOTE — PHYSICAL EXAM
[Well groomed] : well groomed [Average] : average [Cooperative] : cooperative [Anxious] : anxious [Full] : full [Rapid] : rapid [Linear/Goal Directed] : linear/goal directed [WNL] : within normal limits [FreeTextEntry8] : frustrated [de-identified] : tearful at times

## 2024-06-10 NOTE — DISCUSSION/SUMMARY
[Once a week] : once a week [60 minutes] : 60 minutes [de-identified] : [facility] [FreeTextEntry8] : Number of patients in group: 4 1. Discuss/Explore issues regarding experiences arising from being socialized as male including the impact on relationships and vulnerability  2. Develop relational insight into pt's social impact on others and better understand patterns related to the impact of others on them. Learn/develop boundaries in social situations  3. Learn to identify and tolerate a wider range of emotions and develop language around the discussion of emotions.  4. Develop techniques to self regulate/self soothe in overwhelming circumstances, and how to learn to rely on others for co-regulation.  [de-identified] : Plan: continue twice weekly therapy, medication management, group therapy.  [FreeTextEntry4] : Pt arrived on time for session and participated throughout session. He provided empathy and support to other group members. He was able to acknowledge his sense that his problems were less significant than those of others and therefore caused him to withold sharing more in recent weeks/months. He shared about recent experiences with the medical treatment and his disappointment at failing to find a job. He also encouraged other group members and provided acknowledgement of changes that he had seen in them. He described the tricky risk and reward of vulnerability in his experience. Left in good emotional/behavioral control.

## 2024-06-10 NOTE — REASON FOR VISIT
[Other:___] : due to [unfilled] [Continuing, patient seen in-person within last 12 months] : Telehealth services are continuing as patient has been seen in-person within last 12 months. [Telehealth (audio & video) - Individual/Group] : This visit was provided via telehealth using real-time 2-way audio visual technology. [Medical Office: ([facility])___] : The provider was located at the medical office in [unfilled]. [Home] : The patient, [unfilled], was located at home, [unfilled], at the time of the visit. [Verbal consent obtained from patient/other participant(s)] : Verbal consent for telehealth/telephonic services obtained from patient/other participant(s) [FreeTextEntry4] : 5pm [FreeTextEntry5] : 6pm

## 2024-06-12 ENCOUNTER — APPOINTMENT (OUTPATIENT)
Dept: PSYCHIATRY | Facility: CLINIC | Age: 59
End: 2024-06-12
Payer: MEDICAID

## 2024-06-12 PROCEDURE — 90834 PSYTX W PT 45 MINUTES: CPT | Mod: 95,59

## 2024-06-12 NOTE — PHYSICAL EXAM
[Well groomed] : well groomed [Average] : average [Cooperative] : cooperative [Anxious] : anxious [Full] : full [Rapid] : rapid [Linear/Goal Directed] : linear/goal directed [WNL] : within normal limits [FreeTextEntry8] : frustrated [de-identified] : tearful at times

## 2024-06-12 NOTE — REASON FOR VISIT
[Continuing, patient seen in-person within last 12 months] : Telehealth services are continuing as patient has been seen in-person within last 12 months. [Telehealth (audio & video) - Individual/Group] : This visit was provided via telehealth using real-time 2-way audio visual technology. [Other Location: e.g. Home (Enter Location, City,State)___] : The provider was located at [unfilled]. [Home] : The patient, [unfilled], was located at home, [unfilled], at the time of the visit. [Verbal consent obtained from patient/other participant(s)] : Verbal consent for telehealth/telephonic services obtained from patient/other participant(s) [FreeTextEntry4] : 10am [FreeTextEntry5] : [id number] [FreeTextEntry2] : 3/18/24 [Patient] : Patient [FreeTextEntry1] : Pt requests psychotherapy to focus on anhedonia, low motivation, feelings of "stuckness", processing his father's recent death, feelings of humiliation and guilt, perfectionism

## 2024-06-12 NOTE — PLAN
[FreeTextEntry2] : treatment will focus on supporting pt achieving his goals getting work, improving his mood, socializing more, decreasing perfectionism, and increasing motivation. will revisit treatment plan and duration in August. [Psychodynamic Therapy] : Psychodynamic Therapy  [Psychoeducation] : Psychoeducation  [de-identified] : Pt arrived on time for session.  Session focused on exploring pt's relationship with his  and processing recent conflict they have had. Supported pt in mentalizing his 's experience as a way for him to make sense of his 's behavior as well as to provide insight into certain avoidant dynamics in himself. Pt open to and appreciative of interventions. Session concluded with pt in good emotional control.  [Recommended Frequency of Visits: ____] : Recommended frequency of visits: [unfilled] [Return in ____ week(s)] : Return in [unfilled] week(s) [FreeTextEntry1] : continue twice weekly psychotherapy, medication management, modern masculinities group.

## 2024-06-17 ENCOUNTER — APPOINTMENT (OUTPATIENT)
Dept: PSYCHIATRY | Facility: CLINIC | Age: 59
End: 2024-06-17
Payer: MEDICAID

## 2024-06-17 PROCEDURE — 90834 PSYTX W PT 45 MINUTES: CPT | Mod: 59,95

## 2024-06-17 PROCEDURE — 90853 GROUP PSYCHOTHERAPY: CPT | Mod: 95

## 2024-06-17 NOTE — PHYSICAL EXAM
[Well groomed] : well groomed [Average] : average [Cooperative] : cooperative [Anxious] : anxious [Full] : full [Rapid] : rapid [Linear/Goal Directed] : linear/goal directed [WNL] : within normal limits [FreeTextEntry8] : frustrated [de-identified] : tearful at times

## 2024-06-17 NOTE — DISCUSSION/SUMMARY
[Once a week] : once a week [60 minutes] : 60 minutes [de-identified] : [facility] [FreeTextEntry8] : Number of patients in group: 4 1. Discuss/Explore issues regarding experiences arising from being socialized as male including the impact on relationships and vulnerability  2. Develop relational insight into pt's social impact on others and better understand patterns related to the impact of others on them. Learn/develop boundaries in social situations  3. Learn to identify and tolerate a wider range of emotions and develop language around the discussion of emotions.  4. Develop techniques to self regulate/self soothe in overwhelming circumstances, and how to learn to rely on others for co-regulation.  [FreeTextEntry4] : Pt arrived on time for session and participated throughout session. He shared that he had felt put on the spot during the previous session. He engaged well with other group members and expressed his desire for another group member to see the progress that group member had made. He appeared to struggle somewhat with the feedback that this was experienced as condescending byt that other group member. Left in good emotional/behavioral control.  [de-identified] : Plan: continue twice weekly therapy, medication management, group therapy.

## 2024-06-17 NOTE — REASON FOR VISIT
[Continuing, patient seen in-person within last 12 months] : Telehealth services are continuing as patient has been seen in-person within last 12 months. [Telehealth (audio & video) - Individual/Group] : This visit was provided via telehealth using real-time 2-way audio visual technology. [Other Location: e.g. Home (Enter Location, City,State)___] : The provider was located at [unfilled]. [Home] : The patient, [unfilled], was located at home, [unfilled], at the time of the visit. [Verbal consent obtained from patient/other participant(s)] : Verbal consent for telehealth/telephonic services obtained from patient/other participant(s) [FreeTextEntry4] : 10am [FreeTextEntry5] : [id number] [FreeTextEntry2] : 3/18/24 [Patient] : Patient [FreeTextEntry1] : Pt requests psychotherapy to focus on anhedonia, low motivation, feelings of "stuckness", processing his father's recent death, feelings of humiliation and guilt, perfectionism

## 2024-06-17 NOTE — PLAN
[FreeTextEntry2] : treatment will focus on supporting pt achieving his goals getting work, improving his mood, socializing more, decreasing perfectionism, and increasing motivation. will revisit treatment plan and duration in August. [Psychodynamic Therapy] : Psychodynamic Therapy  [Psychoeducation] : Psychoeducation  [de-identified] : Pt arrived on time for session. Session focused on processing pt's reaction to previous week's group therapy session. Also continued to explore pt's avoidance of increasing engagement in social/professional responsibilities and his reactions to writer's encouragement. Pt open to and appreciative of interventions. Session concluded with pt in good emotional control.  [Recommended Frequency of Visits: ____] : Recommended frequency of visits: [unfilled] [Return in ____ week(s)] : Return in [unfilled] week(s) [FreeTextEntry1] : continue twice weekly psychotherapy, medication management, modern masculinities group.

## 2024-06-17 NOTE — REASON FOR VISIT
[Other:___] : due to [unfilled] [Continuing, patient seen in-person within last 12 months] : Telehealth services are continuing as patient has been seen in-person within last 12 months. [Telehealth (audio & video) - Individual/Group] : This visit was provided via telehealth using real-time 2-way audio visual technology. [Medical Office: ([facility])___] : The provider was located at the medical office in [unfilled]. [Home] : The patient, [unfilled], was located at home, [unfilled], at the time of the visit. [Verbal consent obtained from patient/other participant(s)] : Verbal consent for telehealth/telephonic services obtained from patient/other participant(s) [FreeTextEntry4] : 5pm [FreeTextEntry5] : 6pm

## 2024-06-19 ENCOUNTER — APPOINTMENT (OUTPATIENT)
Dept: PSYCHIATRY | Facility: CLINIC | Age: 59
End: 2024-06-19
Payer: MEDICAID

## 2024-06-19 PROCEDURE — 90834 PSYTX W PT 45 MINUTES: CPT | Mod: 95

## 2024-06-19 NOTE — PHYSICAL EXAM
[Well groomed] : well groomed [Average] : average [Cooperative] : cooperative [Anxious] : anxious [Full] : full [Rapid] : rapid [Linear/Goal Directed] : linear/goal directed [WNL] : within normal limits [de-identified] : tearful at times [FreeTextEntry8] : frustrated

## 2024-06-19 NOTE — PLAN
[FreeTextEntry2] : treatment will focus on supporting pt achieving his goals getting work, improving his mood, socializing more, decreasing perfectionism, and increasing motivation. will revisit treatment plan and duration in August. [Psychodynamic Therapy] : Psychodynamic Therapy  [Psychoeducation] : Psychoeducation  [de-identified] : Pt arrived on time for session. Session focused on processing pt's relationship with his  and exploring interpersonal difficulties. Explored feelings of stagnation and frustration, as well as ambivalence around whether there is hope for the future of the relationship to change. Processed pt's feelings of group from monday. Pt open to and appreciative of interventions. Session concluded with pt in good emotional control.  [Recommended Frequency of Visits: ____] : Recommended frequency of visits: [unfilled] [Return in ____ week(s)] : Return in [unfilled] week(s) [FreeTextEntry1] : continue twice weekly psychotherapy, medication management, modern masculinities group.

## 2024-06-24 ENCOUNTER — APPOINTMENT (OUTPATIENT)
Dept: PSYCHIATRY | Facility: CLINIC | Age: 59
End: 2024-06-24
Payer: MEDICAID

## 2024-06-24 PROCEDURE — 90834 PSYTX W PT 45 MINUTES: CPT | Mod: 59

## 2024-06-24 PROCEDURE — 90853 GROUP PSYCHOTHERAPY: CPT | Mod: 95

## 2024-06-26 ENCOUNTER — APPOINTMENT (OUTPATIENT)
Dept: PSYCHIATRY | Facility: CLINIC | Age: 59
End: 2024-06-26
Payer: MEDICAID

## 2024-06-26 PROCEDURE — 90834 PSYTX W PT 45 MINUTES: CPT | Mod: 95

## 2024-07-01 ENCOUNTER — APPOINTMENT (OUTPATIENT)
Dept: PSYCHIATRY | Facility: CLINIC | Age: 59
End: 2024-07-01
Payer: MEDICAID

## 2024-07-01 PROCEDURE — 90834 PSYTX W PT 45 MINUTES: CPT | Mod: 59

## 2024-07-01 PROCEDURE — 90853 GROUP PSYCHOTHERAPY: CPT | Mod: 95

## 2024-07-02 PROBLEM — F31.89 OTHER BIPOLAR DISORDERS: Status: ACTIVE | Noted: 2022-10-25

## 2024-07-03 ENCOUNTER — APPOINTMENT (OUTPATIENT)
Dept: PSYCHIATRY | Facility: CLINIC | Age: 59
End: 2024-07-03
Payer: MEDICAID

## 2024-07-03 ENCOUNTER — APPOINTMENT (OUTPATIENT)
Dept: PSYCHIATRY | Facility: CLINIC | Age: 59
End: 2024-07-03

## 2024-07-03 PROCEDURE — 90834 PSYTX W PT 45 MINUTES: CPT

## 2024-07-08 ENCOUNTER — APPOINTMENT (OUTPATIENT)
Dept: PSYCHIATRY | Facility: CLINIC | Age: 59
End: 2024-07-08
Payer: MEDICAID

## 2024-07-08 PROCEDURE — 90853 GROUP PSYCHOTHERAPY: CPT | Mod: 95

## 2024-07-08 PROCEDURE — 90834 PSYTX W PT 45 MINUTES: CPT | Mod: 59

## 2024-07-10 ENCOUNTER — APPOINTMENT (OUTPATIENT)
Dept: PSYCHIATRY | Facility: CLINIC | Age: 59
End: 2024-07-10
Payer: MEDICAID

## 2024-07-10 DIAGNOSIS — G47.00 INSOMNIA, UNSPECIFIED: ICD-10-CM

## 2024-07-10 DIAGNOSIS — F41.1 GENERALIZED ANXIETY DISORDER: ICD-10-CM

## 2024-07-10 PROCEDURE — G2211 COMPLEX E/M VISIT ADD ON: CPT | Mod: NC,1L,95

## 2024-07-10 PROCEDURE — 99213 OFFICE O/P EST LOW 20 MIN: CPT | Mod: 95

## 2024-07-10 PROCEDURE — 90834 PSYTX W PT 45 MINUTES: CPT | Mod: 95

## 2024-07-15 ENCOUNTER — APPOINTMENT (OUTPATIENT)
Dept: PSYCHIATRY | Facility: CLINIC | Age: 59
End: 2024-07-15

## 2024-07-17 ENCOUNTER — APPOINTMENT (OUTPATIENT)
Dept: PSYCHIATRY | Facility: CLINIC | Age: 59
End: 2024-07-17

## 2024-07-22 ENCOUNTER — APPOINTMENT (OUTPATIENT)
Dept: PSYCHIATRY | Facility: CLINIC | Age: 59
End: 2024-07-22
Payer: MEDICAID

## 2024-07-22 PROCEDURE — 90834 PSYTX W PT 45 MINUTES: CPT | Mod: 59

## 2024-07-22 PROCEDURE — 90853 GROUP PSYCHOTHERAPY: CPT | Mod: 95

## 2024-07-22 NOTE — PLAN
[FreeTextEntry2] : treatment will focus on supporting pt achieving his goals getting work, improving his mood, socializing more, decreasing perfectionism, and increasing motivation. will revisit treatment plan and duration in August. [Psychodynamic Therapy] : Psychodynamic Therapy  [Psychoeducation] : Psychoeducation  [de-identified] : Pt arrived on time for session. Session focused on exploring pt's relationship with his  and their upcoming anniversary. Explored what he might want from the anniversary and his feelings of resentment toward his . Explored pt's ambivalence around accountability and the role of setting a deadline in the treatment for him to begin volunteering. Pt open to and appreciative of interventions. Session concluded with pt in good emotional control.  [Recommended Frequency of Visits: ____] : Recommended frequency of visits: [unfilled] [Return in ____ week(s)] : Return in [unfilled] week(s) [FreeTextEntry1] : continue twice weekly psychotherapy, medication management, modern masculinities group.

## 2024-07-22 NOTE — PHYSICAL EXAM
[Well groomed] : well groomed [Average] : average [Cooperative] : cooperative [Anxious] : anxious [Full] : full [Rapid] : rapid [Linear/Goal Directed] : linear/goal directed [WNL] : within normal limits [FreeTextEntry8] : frustrated [de-identified] : tearful at times

## 2024-07-24 ENCOUNTER — APPOINTMENT (OUTPATIENT)
Dept: PSYCHIATRY | Facility: CLINIC | Age: 59
End: 2024-07-24
Payer: MEDICAID

## 2024-07-24 PROCEDURE — 90834 PSYTX W PT 45 MINUTES: CPT | Mod: 95

## 2024-07-24 NOTE — PLAN
[FreeTextEntry2] : treatment will focus on supporting pt achieving his goals getting work, improving his mood, socializing more, decreasing perfectionism, and increasing motivation. will revisit treatment plan and duration in August. [Psychodynamic Therapy] : Psychodynamic Therapy  [Psychoeducation] : Psychoeducation  [de-identified] : Pt arrived on time for session. Session focused on exploring utility of dyad agreeing to a timeline for pt to begin volunteer/paid work in order to make progress toward treatment goals. Pt reported ambivalence about this due to numerous factors and also articulated the concern that it would indicate a change in relationship with writer. Explore the role of the therapeutic relationship in this context and processed the meaning of it for pt working toward treatment goals. Pt open to and appreciative of interventions. Session concluded with pt in good emotional control.  [Recommended Frequency of Visits: ____] : Recommended frequency of visits: [unfilled] [Return in ____ week(s)] : Return in [unfilled] week(s) [FreeTextEntry1] : continue twice weekly psychotherapy, medication management, modern masculinities group.

## 2024-07-24 NOTE — PHYSICAL EXAM
[Well groomed] : well groomed [Average] : average [Cooperative] : cooperative [Anxious] : anxious [Full] : full [Rapid] : rapid [Linear/Goal Directed] : linear/goal directed [WNL] : within normal limits [FreeTextEntry8] : frustrated [de-identified] : tearful at times

## 2024-07-24 NOTE — REASON FOR VISIT
[Starting, patient seen in-person within last 6 months] : Telehealth services are being started as patient has seen in-person within last 6 months. [Telehealth (audio & video) - Individual/Group] : This visit was provided via telehealth using real-time 2-way audio visual technology. [Other Location: e.g. Home (Enter Location, City,State)___] : The provider was located at [unfilled]. [Home] : The patient, [unfilled], was located at home, [unfilled], at the time of the visit. [Verbal consent obtained from patient/other participant(s)] : Verbal consent for telehealth/telephonic services obtained from patient/other participant(s) [FreeTextEntry4] : 3pm [FreeTextEntry5] : 345pm [FreeTextEntry2] : 7/1/24 [Patient] : Patient [FreeTextEntry1] : Pt requests psychotherapy to focus on anhedonia, low motivation, feelings of "stuckness", processing his father's recent death, feelings of humiliation and guilt, perfectionism

## 2024-07-29 ENCOUNTER — APPOINTMENT (OUTPATIENT)
Dept: PSYCHIATRY | Facility: CLINIC | Age: 59
End: 2024-07-29
Payer: MEDICAID

## 2024-07-29 PROCEDURE — 90834 PSYTX W PT 45 MINUTES: CPT | Mod: 59

## 2024-07-29 PROCEDURE — 90853 GROUP PSYCHOTHERAPY: CPT | Mod: 95

## 2024-07-29 NOTE — DISCUSSION/SUMMARY
[Once a week] : once a week [60 minutes] : 60 minutes [de-identified] : [facility] [FreeTextEntry8] : Number of patients in group: 3 1. Discuss/Explore issues regarding experiences arising from being socialized as male including the impact on relationships and vulnerability  2. Develop relational insight into pt's social impact on others and better understand patterns related to the impact of others on them. Learn/develop boundaries in social situations  3. Learn to identify and tolerate a wider range of emotions and develop language around the discussion of emotions.  4. Develop techniques to self regulate/self soothe in overwhelming circumstances, and how to learn to rely on others for co-regulation.  [FreeTextEntry4] : Pt arrived on time for group therapy. He was active and engaged throughout group. He shared about upcoming medical procedures he will need to have and his concerns about the recovery process. he also actively engaged other group members to learn about their experiences. He was able to reflect on his ambivalence around taking interpersonal risks in vivo as he processed an experience with group around an interaction. He left in good emotional and behavioral control.   [de-identified] : Plan: continue twice weekly therapy, medication management, group therapy.

## 2024-07-29 NOTE — REASON FOR VISIT
[Other:___] : due to [unfilled] [Continuing, patient seen in-person within last 12 months] : Telehealth services are continuing as patient has been seen in-person within last 12 months. [Telehealth (audio & video) - Individual/Group] : This visit was provided via telehealth using real-time 2-way audio visual technology. [Medical Office: ([facility])___] : The provider was located at the medical office in [unfilled]. [Home] : The patient, [unfilled], was located at home, [unfilled], at the time of the visit. [Verbal consent obtained from patient/other participant(s)] : Verbal consent for telehealth/telephonic services obtained from patient/other participant(s) [FreeTextEntry4] : 5pm [FreeTextEntry5] : 6pm

## 2024-07-29 NOTE — PHYSICAL EXAM
[Well groomed] : well groomed [Average] : average [Cooperative] : cooperative [Anxious] : anxious [Full] : full [Rapid] : rapid [Linear/Goal Directed] : linear/goal directed [WNL] : within normal limits [FreeTextEntry8] : frustrated [de-identified] : tearful at times

## 2024-07-29 NOTE — PLAN
[FreeTextEntry2] : treatment will focus on supporting pt achieving his goals getting work, improving his mood, socializing more, decreasing perfectionism, and increasing motivation. will revisit treatment plan and duration in August. [Psychodynamic Therapy] : Psychodynamic Therapy  [Psychoeducation] : Psychoeducation  [de-identified] : Pt arrived on time for session. Session focused on exploring pt's anxiety around upcoming orthopedic surgery. Also processed a difficult altercation with pt's . Reflected on how this was impacted by his sense of masculinity. Pt open to and appreciative of interventions. Session concluded with pt in good emotional control.  [Recommended Frequency of Visits: ____] : Recommended frequency of visits: [unfilled] [Return in ____ week(s)] : Return in [unfilled] week(s) [FreeTextEntry1] : continue twice weekly psychotherapy, medication management, modern masculinities group.

## 2024-07-31 ENCOUNTER — APPOINTMENT (OUTPATIENT)
Dept: PSYCHIATRY | Facility: CLINIC | Age: 59
End: 2024-07-31
Payer: MEDICAID

## 2024-07-31 PROCEDURE — 90834 PSYTX W PT 45 MINUTES: CPT | Mod: 95

## 2024-07-31 NOTE — PLAN
[FreeTextEntry2] : treatment will focus on supporting pt achieving his goals getting work, improving his mood, socializing more, decreasing perfectionism, and increasing motivation. will revisit treatment plan and duration in August. [Psychodynamic Therapy] : Psychodynamic Therapy  [Psychoeducation] : Psychoeducation  [de-identified] : Pt arrived on time for session. Session focused on exploring a recent experience with medical providers in which pt felt vulnerable, exposed, and self-conscious about his body, and feeling that his providers were inconsiderate of his wishes. Explored his experience being reminded of "being the fat kid," and how this brought of feelings of shame. Explored how this relates to his identity and history as a [place] man. Pt open to and appreciative of interventions. Session concluded with pt in good emotional control.  [Recommended Frequency of Visits: ____] : Recommended frequency of visits: [unfilled] [Return in ____ week(s)] : Return in [unfilled] week(s) [FreeTextEntry1] : continue twice weekly psychotherapy, medication management, modern masculinities group.

## 2024-07-31 NOTE — PHYSICAL EXAM
[Well groomed] : well groomed [Average] : average [Cooperative] : cooperative [Anxious] : anxious [Full] : full [Rapid] : rapid [Linear/Goal Directed] : linear/goal directed [WNL] : within normal limits [FreeTextEntry8] : frustrated [de-identified] : tearful at times

## 2024-08-05 ENCOUNTER — APPOINTMENT (OUTPATIENT)
Dept: PSYCHIATRY | Facility: CLINIC | Age: 59
End: 2024-08-05

## 2024-08-05 PROCEDURE — 90853 GROUP PSYCHOTHERAPY: CPT | Mod: 95

## 2024-08-05 PROCEDURE — 90834 PSYTX W PT 45 MINUTES: CPT | Mod: 59

## 2024-08-05 NOTE — DISCUSSION/SUMMARY
[Once a week] : once a week [60 minutes] : 60 minutes [de-identified] : Modern Masculinities Group [FreeTextEntry8] : Number of patients in group: 4 1. Discuss/Explore issues regarding experiences arising from being socialized as male including the impact on relationships and vulnerability  2. Develop relational insight into pt's social impact on others and better understand patterns related to the impact of others on them. Learn/develop boundaries in social situations  3. Learn to identify and tolerate a wider range of emotions and develop language around the discussion of emotions.  4. Develop techniques to self regulate/self soothe in overwhelming circumstances, and how to learn to rely on others for co-regulation.  [FreeTextEntry4] : Pt arrived on time for group therapy. He was active and engaged throughout group. He provided empathy and support to another group member grieving his father. He shared about the difficulty of not knowing exactly how to help. He encouraged that pt to take care of himsefl. He left in good emotional and behavioral control.   [de-identified] : Plan: continue twice weekly therapy, medication management, group therapy.

## 2024-08-05 NOTE — PHYSICAL EXAM
[Well groomed] : well groomed [Average] : average [Cooperative] : cooperative [Anxious] : anxious [Full] : full [Rapid] : rapid [Linear/Goal Directed] : linear/goal directed [WNL] : within normal limits [FreeTextEntry8] : frustrated [de-identified] : tearful at times

## 2024-08-05 NOTE — PLAN
[FreeTextEntry2] : treatment will focus on supporting pt achieving his goals getting work, improving his mood, socializing more, decreasing perfectionism, and increasing motivation. will revisit treatment plan and duration in August. [Psychodynamic Therapy] : Psychodynamic Therapy  [Psychoeducation] : Psychoeducation  [de-identified] : Pt arrived on time for session. Session focused on processing aspects of the transference/countertransference dynamic, particularly around pt's impressions of writer and his desire for a more symmetrical relationship. Explored the impact of this dynamic on writer's experience of pt and of the relationship. Explored pt's experience of writer's tendency away from self-disclosure in the treatment. Explored pt's care, concern, and desire for closeness from writer. Pt open to and appreciative of interventions. Session concluded with pt in good emotional control.  [Recommended Frequency of Visits: ____] : Recommended frequency of visits: [unfilled] [Return in ____ week(s)] : Return in [unfilled] week(s) [FreeTextEntry1] : continue twice weekly psychotherapy, medication management, modern masculinities group.

## 2024-08-05 NOTE — REASON FOR VISIT
[Other:___] : due to [unfilled] [Continuing, patient seen in-person within last 12 months] : Telehealth services are continuing as patient has been seen in-person within last 12 months. [Telehealth (audio & video) - Individual/Group] : This visit was provided via telehealth using real-time 2-way audio visual technology. [Medical Office: ([facility])___] : The provider was located at the medical office in [unfilled]. [Home] : The patient, [unfilled], was located at home, [unfilled], at the time of the visit. [Verbal consent obtained from patient/other participant(s)] : Verbal consent for telehealth/telephonic services obtained from patient/other participant(s) [FreeTextEntry4] : 5pm [FreeTextEntry5] : 6pm

## 2024-08-07 ENCOUNTER — APPOINTMENT (OUTPATIENT)
Dept: PSYCHIATRY | Facility: CLINIC | Age: 59
End: 2024-08-07

## 2024-08-07 PROCEDURE — 90834 PSYTX W PT 45 MINUTES: CPT | Mod: 95

## 2024-08-07 NOTE — PHYSICAL EXAM
[Well groomed] : well groomed [Average] : average [Cooperative] : cooperative [Anxious] : anxious [Full] : full [Rapid] : rapid [Linear/Goal Directed] : linear/goal directed [WNL] : within normal limits [FreeTextEntry8] : frustrated [de-identified] : tearful at times

## 2024-08-07 NOTE — PLAN
[FreeTextEntry2] : treatment will focus on supporting pt achieving his goals getting work, improving his mood, socializing more, decreasing perfectionism, and increasing motivation. will revisit treatment plan and duration in August. [Psychodynamic Therapy] : Psychodynamic Therapy  [Psychoeducation] : Psychoeducation  [de-identified] : Pt arrived on time for session. Session focused on exploring pt's anxieties in response to his 's concern about their financial situation. Explored how this made things "more real," and discussed the possibility of needing to "get a shitty job" if it comes to that. Explored pt's previous use of denial as a coping strategy in this regard and how this results in him being less avoidant, as aligned with treatment goals. Explored pt's concern about another group member. Pt open to and appreciative of interventions. Session concluded with pt in good emotional control.  [Recommended Frequency of Visits: ____] : Recommended frequency of visits: [unfilled] [Return in ____ week(s)] : Return in [unfilled] week(s) [FreeTextEntry1] : continue twice weekly psychotherapy, medication management, modern masculinities group.

## 2024-08-07 NOTE — REASON FOR VISIT
[employer] [Starting, patient seen in-person within last 6 months] : Telehealth services are being started as patient has seen in-person within last 6 months. [Telehealth (audio & video) - Individual/Group] : This visit was provided via telehealth using real-time 2-way audio visual technology. [Other Location: e.g. Home (Enter Location, City,State)___] : The provider was located at [unfilled]. [Home] : The patient, [unfilled], was located at home, [unfilled], at the time of the visit. [Verbal consent obtained from patient/other participant(s)] : Verbal consent for telehealth/telephonic services obtained from patient/other participant(s) [FreeTextEntry4] : 3pm [FreeTextEntry5] : 345pm [FreeTextEntry2] : 7/1/24 [Patient] : Patient [FreeTextEntry1] : Pt requests psychotherapy to focus on anhedonia, low motivation, feelings of "stuckness", processing his father's recent death, feelings of humiliation and guilt, perfectionism

## 2024-08-12 ENCOUNTER — APPOINTMENT (OUTPATIENT)
Dept: PSYCHIATRY | Facility: CLINIC | Age: 59
End: 2024-08-12

## 2024-08-14 ENCOUNTER — APPOINTMENT (OUTPATIENT)
Dept: PSYCHIATRY | Facility: CLINIC | Age: 59
End: 2024-08-14
Payer: MEDICAID

## 2024-08-14 DIAGNOSIS — F41.1 GENERALIZED ANXIETY DISORDER: ICD-10-CM

## 2024-08-14 DIAGNOSIS — G47.00 INSOMNIA, UNSPECIFIED: ICD-10-CM

## 2024-08-14 PROCEDURE — 90834 PSYTX W PT 45 MINUTES: CPT | Mod: 95

## 2024-08-14 PROCEDURE — 99214 OFFICE O/P EST MOD 30 MIN: CPT | Mod: 95

## 2024-08-14 PROCEDURE — G2211 COMPLEX E/M VISIT ADD ON: CPT | Mod: NC,1L,95

## 2024-08-14 NOTE — SOCIAL HISTORY
[FreeTextEntry1] : Pt has bachelor's degree in history, worked in beauty/cosmetic industry for 30 years, worked at [employer] but currently unemployed. Works as election poll worker seasonally. , living with . Born [place] raised [place], moved to [place], then [place] then [place], then [place], then [place] in 2009. \par  Denies any abuse history.\par

## 2024-08-14 NOTE — PLAN
[FreeTextEntry2] : treatment will focus on supporting pt achieving his goals getting work, improving his mood, socializing more, decreasing perfectionism, and increasing motivation. will revisit treatment plan and duration in August. [Psychodynamic Therapy] : Psychodynamic Therapy  [Psychoeducation] : Psychoeducation  [de-identified] : Pt arrived on time for session. Session focused on processing pt's experience post foot surgery last week. Explored his concerns about how group members might be doing. Pt open to and appreciative of interventions. Session concluded with pt in good emotional control.  [Recommended Frequency of Visits: ____] : Recommended frequency of visits: [unfilled] [Return in ____ week(s)] : Return in [unfilled] week(s) [FreeTextEntry1] : continue twice weekly psychotherapy, medication management, modern masculinities group.

## 2024-08-14 NOTE — PHYSICAL EXAM
[Well groomed] : well groomed [Average] : average [Cooperative] : cooperative [Anxious] : anxious [Full] : full [Rapid] : rapid [Linear/Goal Directed] : linear/goal directed [WNL] : within normal limits [FreeTextEntry8] : frustrated [de-identified] : tearful at times

## 2024-08-14 NOTE — SOCIAL HISTORY
[FreeTextEntry1] : Pt has bachelor's degree in history, worked in beauty/cosmetic industry for 30 years, worked at [employer] but currently unemployed. Works as election poll worker seasonally. , living with . Born [place] raised [place], [place] then [place], then [place] in [place]. \par  Denies any abuse history.\par

## 2024-08-14 NOTE — PLAN
[Yes. details: ___] : Yes, [unfilled] [Medication education provided] : Medication education provided. [Rationale for medication choices, possible risks/precautions, benefits, alternative treatment choices, and consequences of non-treatment discussed] : Rationale for medication choices, possible risks/precautions, benefits, alternative treatment choices, and consequences of non-treatment discussed with patient/family/caregiver  [FreeTextEntry5] : Offered support/encouragement, psychoeducation, counseling regarding: diagnosis/diagnoses, medications, symptoms, recommendations etc.  Reviewed/discussed plan below:  -Continue Bupropion XL at 450 mg po daily  -May take hydroxyzine 25 mg prn and reviewed potential to take with low dose Percocet he's taking though discussed general warning that is for higher dosage combinations -May take Ambien prn when not taking Percocet  -Continue Lamictal at 100 mg daily -Continue therapy (is doing twice weekly with Yrn); writer may collaborate with pt's therapist prn -To continue Men's group, Mondays at 5 pm -Contact writer prn  30 minutes spent reviewing prior records/notes, seeing patient and recording session note

## 2024-08-14 NOTE — HISTORY OF PRESENT ILLNESS
[Not Applicable] : Not applicable [FreeTextEntry1] : Initially presented to [facility] for continued treatment of BIpolar Disorder unspecified and anxiety.  [FreeTextEntry2] : Dx with bipolar disorder in 2016, however no clear manic episode reported in lifetime. No IPP admissions in lifetime, saw 3 prior psychiatrists, last time was in 2017 - PMD continued prescribing psychiatric medications since then.  [FreeTextEntry3] : was first started on medications in 3087-6080: wellbutrin ER 300mg, lamotrigine 25 mg daily (started at 50mg, then brought down to 25mg), zolpidem 10mg PRN (1/2 of 1 PRN, says 30 days lasts 3 months. Denies any other medication trials.

## 2024-08-14 NOTE — DISCUSSION/SUMMARY
[Date of Last Physical Exam: _____] : Date of Last Physical Exam: [unfilled] [Date of Last Annual Labs: _____] : Date of Last Annual Labs: [unfilled] [Annual Review of Systems Completed?] : Annual Review of Systems Completed: Yes [Tobacco Screening Completed?] : Tobacco Screening Completed: Yes [FreeTextEntry1] : Patient is a 58 year old white male, currently unemployed, former executive in cosmetics industry, domiciled with , with reported psychiatric history of bipolar disorder diagnosed in 2016, with no IPP admissions, initially presented to clinic to re-establish psychiatric care after having last seen psychiatrist in 2017 for medication management as well as therapy, presenting today for follow-up. On initial evaluation, patient had presented with symptoms suggestive of Generalized Anxiety Disorder, but did not appear acutely depressed, suicidal, manic, or psychotic. While patient reported history of Bipolar 1 Disorder, he did not report any lifetime manic episodes and patient's medication regimen at presentation (Lamictal 25 mg daily) would not likely have been effective in treatment of Bipolar 1 Disorder.   Even so, he was having frequent mood instability that improved with titration upwards of Lamictal so a Bipolar 2 or unspecified possible     Dx: Bipolar Spectrum Disorder  Patient overall psychiatrically stable with low level anxiety and minimal to mild dysphoria.

## 2024-08-14 NOTE — HISTORY OF PRESENT ILLNESS
[Not Applicable] : Not applicable [FreeTextEntry1] : Initially presented to [facility] for continued treatment of BIpolar Disorder unspecified and anxiety.  [FreeTextEntry2] : Dx with bipolar disorder in 2016, however no clear manic episode reported in lifetime. No IPP admissions in lifetime, saw 3 prior psychiatrists, last time was in 2017 - PMD continued prescribing psychiatric medications since then.  [FreeTextEntry3] : was first started on medications in 2886-9952: wellbutrin ER 300mg, lamotrigine 25 mg daily (started at 50mg, then brought down to 25mg), zolpidem 10mg PRN (1/2 of 1 PRN, says 30 days lasts 3 months. Denies any other medication trials.

## 2024-08-14 NOTE — REVIEW OF SYSTEMS
[Negative] : Allergic/Immunologic [FreeTextEntry4] : some sporadic respiratory issues [FreeTextEntry9] : recovering from foot surgery [de-identified] : stable

## 2024-08-14 NOTE — PHYSICAL EXAM
[Average] : average [Cooperative] : cooperative [Anxious] : anxious [Full] : full [Clear] : clear [Linear/Goal Directed] : linear/goal directed [WNL] : within normal limits [No] : No [FreeTextEntry8] : good [de-identified] : chronically fast paced in anxious way [Individual reports tobacco use during the last 30 days?] : Individual reports tobacco use during the last 30 days? No

## 2024-08-14 NOTE — REVIEW OF SYSTEMS
[Negative] : Allergic/Immunologic [FreeTextEntry4] : some sporadic respiratory issues [FreeTextEntry9] : recovering from foot surgery [de-identified] : stable

## 2024-08-14 NOTE — PHYSICAL EXAM
[Average] : average [Cooperative] : cooperative [Anxious] : anxious [Full] : full [Clear] : clear [Linear/Goal Directed] : linear/goal directed [WNL] : within normal limits [No] : No [FreeTextEntry8] : good [de-identified] : chronically fast paced in anxious way [Individual reports tobacco use during the last 30 days?] : Individual reports tobacco use during the last 30 days? No

## 2024-08-19 ENCOUNTER — APPOINTMENT (OUTPATIENT)
Dept: PSYCHIATRY | Facility: CLINIC | Age: 59
End: 2024-08-19
Payer: MEDICAID

## 2024-08-19 PROCEDURE — 90834 PSYTX W PT 45 MINUTES: CPT | Mod: 59,95

## 2024-08-19 PROCEDURE — 90853 GROUP PSYCHOTHERAPY: CPT | Mod: 95

## 2024-08-19 NOTE — PHYSICAL EXAM
[Well groomed] : well groomed [Average] : average [Cooperative] : cooperative [Anxious] : anxious [Full] : full [Rapid] : rapid [Linear/Goal Directed] : linear/goal directed [WNL] : within normal limits [FreeTextEntry8] : frustrated [de-identified] : tearful at times

## 2024-08-19 NOTE — PLAN
[FreeTextEntry2] : treatment will focus on supporting pt achieving his goals getting work, improving his mood, socializing more, decreasing perfectionism, and increasing motivation. will revisit treatment plan and duration in August. [Psychodynamic Therapy] : Psychodynamic Therapy  [Psychoeducation] : Psychoeducation  [de-identified] : Pt arrived on time for session. Session focused on exploring pt's feeling toward his partner, particularly anger at the ways in which pt perceives his partner to not understand him and not being interested in attending to his needs. Also explored pt's mixed feelings about a recent job interview and his frustration with various aspects of it. Pt open to and appreciative of interventions. Session concluded with pt in good emotional control.  [Recommended Frequency of Visits: ____] : Recommended frequency of visits: [unfilled] [Return in ____ week(s)] : Return in [unfilled] week(s) [FreeTextEntry1] : continue twice weekly psychotherapy, medication management, modern masculinities group.

## 2024-08-21 ENCOUNTER — NON-APPOINTMENT (OUTPATIENT)
Age: 59
End: 2024-08-21

## 2024-08-21 ENCOUNTER — APPOINTMENT (OUTPATIENT)
Dept: PSYCHIATRY | Facility: CLINIC | Age: 59
End: 2024-08-21

## 2024-08-21 PROCEDURE — 90834 PSYTX W PT 45 MINUTES: CPT | Mod: 95

## 2024-08-21 NOTE — PLAN
[FreeTextEntry2] : treatment will focus on supporting pt achieving his goals getting work, improving his mood, socializing more, decreasing perfectionism, and increasing motivation. will revisit treatment plan and duration in August. [Psychodynamic Therapy] : Psychodynamic Therapy  [Psychoeducation] : Psychoeducation  [de-identified] : Pt arrived on time for session. 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 Explored the dynamics of writer providing the job listing and the role that plays in pt wanting the job. Explored dynamics and conflicts around boundaries in the therapeutic relationship. Treatment plan to be completed next session. Pt open to and appreciative of interventions. Session concluded with pt in good emotional control.  [Recommended Frequency of Visits: ____] : Recommended frequency of visits: [unfilled] [Return in ____ week(s)] : Return in [unfilled] week(s) [FreeTextEntry1] : continue twice weekly psychotherapy, medication management, modern masculinities group.

## 2024-08-21 NOTE — PHYSICAL EXAM
[Well groomed] : well groomed [Average] : average [Cooperative] : cooperative [Anxious] : anxious [Full] : full [Rapid] : rapid [Linear/Goal Directed] : linear/goal directed [WNL] : within normal limits [FreeTextEntry8] : frustrated [de-identified] : tearful at times

## 2024-08-26 ENCOUNTER — APPOINTMENT (OUTPATIENT)
Dept: PSYCHIATRY | Facility: CLINIC | Age: 59
End: 2024-08-26
Payer: MEDICAID

## 2024-08-26 ENCOUNTER — NON-APPOINTMENT (OUTPATIENT)
Age: 59
End: 2024-08-26

## 2024-08-26 DIAGNOSIS — F32.9 MAJOR DEPRESSIVE DISORDER, SINGLE EPISODE, UNSPECIFIED: ICD-10-CM

## 2024-08-26 DIAGNOSIS — F41.1 GENERALIZED ANXIETY DISORDER: ICD-10-CM

## 2024-08-26 PROCEDURE — 90853 GROUP PSYCHOTHERAPY: CPT

## 2024-08-26 PROCEDURE — 90834 PSYTX W PT 45 MINUTES: CPT | Mod: 95

## 2024-08-26 PROCEDURE — 90834 PSYTX W PT 45 MINUTES: CPT | Mod: 95,59

## 2024-08-26 NOTE — PLAN
[FreeTextEntry2] : treatment will focus on supporting pt achieving his goals getting work, improving his mood, socializing more, decreasing perfectionism, and increasing motivation. will revisit treatment plan and duration in August. [Psychodynamic Therapy] : Psychodynamic Therapy  [Psychoeducation] : Psychoeducation  [de-identified] : Pt arrived on time for session. Session focused primarily on collaborative completion of pt's treatment plan and relevant measures. Dyad also continued to discuss therapeutic relationship and interpersonal dynamics related to pt's desires for twinship and closeness with writer. Pt open to and appreciative of interventions. Session concluded with pt in good emotional control.  [Recommended Frequency of Visits: ____] : Recommended frequency of visits: [unfilled] [Return in ____ week(s)] : Return in [unfilled] week(s) [FreeTextEntry1] : continue twice weekly psychotherapy, medication management, modern masculinities group.

## 2024-08-26 NOTE — REASON FOR VISIT
[Continuity of care] : to ensure continuity of care [Other:___] : due to [unfilled] [Continuing, patient seen in-person within last 12 months] : Telehealth services are continuing as patient has been seen in-person within last 12 months. [Telehealth (audio & video) - Individual/Group] : This visit was provided via telehealth using real-time 2-way audio visual technology. [Medical Office: ([facility])___] : The provider was located at the medical office in [unfilled]. [Home] : The patient, [unfilled], was located at home, [unfilled], at the time of the visit. [Verbal consent obtained from patient/other participant(s)] : Verbal consent for telehealth/telephonic services obtained from patient/other participant(s) [FreeTextEntry4] : 5pm [FreeTextEntry5] : 6pm

## 2024-08-26 NOTE — DISCUSSION/SUMMARY
[Once a week] : once a week [60 minutes] : 60 minutes [de-identified] : Modern Masculinities Group [FreeTextEntry8] : Number of patients in group: 3 1. Discuss/Explore issues regarding experiences arising from being socialized as male including the impact on relationships and vulnerability  2. Develop relational insight into pt's social impact on others and better understand patterns related to the impact of others on them. Learn/develop boundaries in social situations  3. Learn to identify and tolerate a wider range of emotions and develop language around the discussion of emotions.  4. Develop techniques to self regulate/self soothe in overwhelming circumstances, and how to learn to rely on others for co-regulation.  [FreeTextEntry4] : Pt arrived on time for group therapy. He was active and engaged throughout group. He actively engaged other group members and was curious about their lives. He also shared about a recent job interview he had that he was excited about. He also contributed to a discussion around the relationship between masculinity and providing for others. He left in good emotional and behavioral control.   [de-identified] : Plan: continue twice weekly therapy, medication management, group therapy.

## 2024-08-26 NOTE — PHYSICAL EXAM
[Well groomed] : well groomed [Average] : average [Cooperative] : cooperative [Anxious] : anxious [Full] : full [Rapid] : rapid [Linear/Goal Directed] : linear/goal directed [WNL] : within normal limits [FreeTextEntry8] : frustrated [de-identified] : tearful at times

## 2024-08-28 ENCOUNTER — APPOINTMENT (OUTPATIENT)
Dept: PSYCHIATRY | Facility: CLINIC | Age: 59
End: 2024-08-28

## 2024-09-02 ENCOUNTER — OUTPATIENT (OUTPATIENT)
Dept: OUTPATIENT SERVICES | Facility: HOSPITAL | Age: 59
LOS: 1 days | Discharge: HOME | End: 2024-09-02

## 2024-09-03 NOTE — PHYSICAL EXAM
Melina Steele is a 57 y.o. female presents today under recommendation by her PMD due to recently diagnosed anemia. She reports GI work up was negative. Admits to chronically heavy menses since BTL. Wears pads and tampons at the same time, passing clots, changing q 2-3 hrs, lasting 5-7 days monthly. No dizziness. Takes iron bid. Gained 20 lbs since quitting smoking. Review of Systems:   General ROS: negative for - fever, chills, malaise  Endocrine ROS: negative for -  breast tenderness/mass/nipple discharge/galactorrhea, hair pattern changes, skin changes or temperature intolerance. Respiratory ROS: no cough, shortness of breath, or wheezing  Cardiovascular ROS: no chest pain or dyspnea on exertion  Gastrointestinal ROS: no change in bowel habits, or black or bloody stools, nausea, vomiting. Genito-Urinary ROS: no dysuria, trouble voiding, incontinence or hematuria. Positive for random pelvic pain during menses, sharp and crampy in nature. Musculoskeletal ROS: negative  Neurological ROS: no TIA or stroke symptoms  Dermatological ROS: negative    OB History      Para Term  AB TAB SAB Ectopic Multiple Living    2 2 2               Gyn hx:  Regular since menarche. Hx abnormal pap. Last pap:  2 years ago, done elsewhere. Past Medical History   Diagnosis Date    Abnormal Papanicolaou smear of cervix     Anemia      Past Surgical History   Procedure Laterality Date    Hx colposcopy      Pr lap,tubal cautery      Hx orthopaedic       Social History     Social History    Marital status:      Spouse name: N/A    Number of children: N/A    Years of education: N/A     Occupational History    Not on file.      Social History Main Topics    Smoking status: Former Smoker    Smokeless tobacco: Never Used    Alcohol use No    Drug use: No    Sexual activity: Not Currently     Other Topics Concern     Service No     retired [employer]    Blood Transfusions No  Weight Concern Yes    Exercise Yes    Seat Belt Yes    Self-Exams Yes     Social History Narrative    No narrative on file     No Known Allergies  Prior to Admission medications    Medication Sig Start Date End Date Taking? Authorizing Provider   ferrous sulfate (IRON) 325 mg (65 mg iron) tablet Take  by mouth two (2) times a day. Yes Historical Provider   zolpidem (AMBIEN) 10 mg tablet Take  by mouth nightly as needed for Sleep. Yes Historical Provider        Objective:     Vitals:    02/03/17 0956 02/03/17 1002   BP: 141/84 126/80   Pulse: 67 69   Weight: 171 lb (77.6 kg)    Height: 5' 6\" (1.676 m)      Body mass index is 27.6 kg/(m^2). Physical Exam:  General appearance - well appearing, and in no distress and well hydrated  Mental status - alert, oriented to person, place, and time, normal mood, behavior, speech, dress, motor activity, and thought processes  Respiratory:  Normal respiratory effort, no intercostal retractions or use of accessory muscles. Abdomen - soft, nontender, nondistended, no masses or organomegaly  Pelvic - No inguinal lymphadenopathy, normal urethral meatus and no bladder tenderness. VULVA: normal appearing vulva with no masses, tenderness or lesions, VAGINA: normal appearing vagina with normal color and discharge, no lesions, PELVIC FLOOR EXAM: no cystocele, rectocele or prolapse noted, CERVIX: normal appearing cervix without discharge or lesions, UTERUS: uterus approx 10-12 cm size, deviated to left, nontender, mobile, ADNEXA: normal adnexa in size, nontender and no masses  Extremities - No edema. Skin - normal coloration and turgor, no rashes, no suspicious skin lesions noted    Assessment/Plan:       ICD-10-CM ICD-9-CM    1. Abnormal uterine bleeding (AUB) N93.9 626.9    2. Overweight (BMI 25.0-29. 9) E66.3 278.02      DDX of AUB using PALM COEIN classification discussed.  We focussed our discussion on hormonal fluctuations especially as it relates to perimenopausal state, obesity/peripheral conversion of estrogen and its association with endometrial hyperplasia/malignancy as well as structural abnormalities such as polyps/fibroids  Pelvic US recommended. Continued caloric restrictions and regular exercise strongly encouraged. Return in 2 weeks to discuss US result and plan of care. Plan of care discussed. Patient expressed understanding and agreement.              Signed By:  Brielle Oconnell DO     February 3, 2017 [1 - Monthly or less] : 1 - Monthly or less [1 - 3 or 4] : 1 - 3 or 4 [1 - Less than monthly] : 1 - Less than monthly [0 - Never] : 0 - Never [0 - No] : 0 - No [1] : 1 [5] : 5 [6] : 6 [4] : 4 [3] : 3 [] : No [Individual reports tobacco use during the last 30 days?] : Individual reports tobacco use during the last 30 days? No [Individual reports use of the following tobacco products during the last 30 days?] : Individual reports use of the following tobacco products during the last 30 days? No [Individual reports current use of tobacco cessation medication or nicotine replacement therapy?] : Individual reports current use of tobacco cessation medication or nicotine replacement therapy? No [Was tobacco cessation medication and/or nicotine replacement therapy recommended?] : Was tobacco cessation medication and/or nicotine replacement therapy recommended? No [Does individual accept referral to MD for cessation medication or NRT?] : Does individual accept referral to MD for cessation medication or NRT? No [FreeTextEntry1] : 0

## 2024-09-03 NOTE — PHYSICAL EXAM
[1 - Monthly or less] : 1 - Monthly or less [1 - 3 or 4] : 1 - 3 or 4 [1 - Less than monthly] : 1 - Less than monthly [0 - Never] : 0 - Never [0 - No] : 0 - No [1] : 1 [5] : 5 [6] : 6 [4] : 4 [3] : 3 [] : No [Individual reports tobacco use during the last 30 days?] : Individual reports tobacco use during the last 30 days? No [Individual reports use of the following tobacco products during the last 30 days?] : Individual reports use of the following tobacco products during the last 30 days? No [Individual reports current use of tobacco cessation medication or nicotine replacement therapy?] : Individual reports current use of tobacco cessation medication or nicotine replacement therapy? No [Was tobacco cessation medication and/or nicotine replacement therapy recommended?] : Was tobacco cessation medication and/or nicotine replacement therapy recommended? No [Does individual accept referral to MD for cessation medication or NRT?] : Does individual accept referral to MD for cessation medication or NRT? No [FreeTextEntry1] : 0

## 2024-09-03 NOTE — DISCUSSION/SUMMARY
[Initial Plan] : Initial Plan [Able to manage surrounding demands and opportunities] : able to manage surrounding demands and opportunities [Able to exercise self-direction] : able to exercise self-direction [Able to set and pursue goals] : able to set and pursue goals [Adherent to treatment recommendations] : adherent to treatment recommendations [Articulate] : articulate [Attempting to realize their potential] : Attempting to realize their potential [Cognitively intact] : cognitively intact [Good impulse control] : good impulse control [Insightful] : insightful [Intelligent] : intelligent [Motivated to participate in treatment] : motivated to participate in treatment [Health literacy] : health literacy [Motivated to maintain or improve physical health] : motivated to maintain or improve physical health [In good health] : in good health [Financially stable] : financially stable [Has vocational interests or hobbies] : has vocational interests or hobbies [Part of a supportive marriage] : part of a supportive marriage [Part of a supportive family] : part of a supportive family [Housing stability] : housing stability [Civic organizations] : civic organizations [High level of education] : high level of education [English fluency] : English fluency [Connected to healthcare] : connected to healthcare [Access to safe outdoor spaces] : access to safe outdoor spaces [Physical Health] : Physical Health [Occupational/Educational] : Occupational/Educational [Interpersonal Relationships] : Interpersonal Relationships [Mental Health] : Mental Health [Initial] : Initial [every ___ weeks] : every [unfilled] weeks [___ times a week] : [unfilled] times a week [A change in level of care is needed as evidenced by:] : A change in level of care is needed as evidenced by: [None - Reason others did not participate:] : None - Reason others did not participate:  [Therapist] : Therapist [Tobacco Screening Completed?] : Tobacco Screening Completed: Yes [No] : No [Yes: Details:___] : Yes: [unfilled] [Completed, copy requested] : Completed, copy requested [Obtained and reviewed] : Obtained and reviewed [Yes] : Yes [Patient has been tested for HIV?] : Patient has been tested for HIV [Negative] : Negative [Patient has been tested for Hepatitis?] : Patient has been tested for hepatitis [Unknown] : Unknown [Current or past COVID-19 diagnosis?] : Patient had a present or past COVID-19 diagnosis [Vaccinated?] : is vaccinated [Date of last vaccination (mm/dd/yy):___] : date of last vaccination: [unfilled] [Education provided about COVID-19?] : Education provided about COVID-19 [Patient is not prescribed antipsychotic medication] : Patient is not prescribed antipsychotic medication [Does patient use tobacco products?] : Patient does not use tobacco products [Does patient use medical marijuana?] : Patient does not use medical marijuana. 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 improve "emotional stability"  [de-identified] : Describe situations, thoughts, feelings, and actions associated with anxieties and worries, their impact on functioning, and  attempts to resolve them. [de-identified] : 8/1/25 [de-identified] : pt has improved in ability to name these himself, but on an emotional level continues to struggle to fully manage the impacts. [de-identified] : Establish an inward sense of self-worth, confidence, and competence, as reported by pt. Increase ability to forgive self for failures as indicated by SOS value of 5 [de-identified] : 8/1/25 [FreeTextEntry5] : individual psychoanalytic psychotherapy, 45 minutes/session 2x/week; interpersonal process group 60 minutes per week; medication management sessions [de-identified] : Dr. Carlos Tucker [de-identified] : [facility] mascul[facility] weekly interpersonal process group [de-identified] : complete symptomatic remission with no adjustment in medications over 1 year [Does patient require any additional health services or referrals?] : Does patient require any additional health services or referrals: No

## 2024-09-03 NOTE — DISCUSSION/SUMMARY
[Initial Plan] : Initial Plan [Able to manage surrounding demands and opportunities] : able to manage surrounding demands and opportunities [Able to exercise self-direction] : able to exercise self-direction [Able to set and pursue goals] : able to set and pursue goals [Adherent to treatment recommendations] : adherent to treatment recommendations [Articulate] : articulate [Attempting to realize their potential] : Attempting to realize their potential [Cognitively intact] : cognitively intact [Good impulse control] : good impulse control [Insightful] : insightful [Intelligent] : intelligent [Motivated to participate in treatment] : motivated to participate in treatment [Health literacy] : health literacy [Motivated to maintain or improve physical health] : motivated to maintain or improve physical health [In good health] : in good health [Financially stable] : financially stable [Has vocational interests or hobbies] : has vocational interests or hobbies [Part of a supportive marriage] : part of a supportive marriage [Part of a supportive family] : part of a supportive family [Housing stability] : housing stability [Civic organizations] : civic organizations [High level of education] : high level of education [English fluency] : English fluency [Connected to healthcare] : connected to healthcare [Access to safe outdoor spaces] : access to safe outdoor spaces [Physical Health] : Physical Health [Occupational/Educational] : Occupational/Educational [Interpersonal Relationships] : Interpersonal Relationships [Mental Health] : Mental Health [Initial] : Initial [every ___ weeks] : every [unfilled] weeks [___ times a week] : [unfilled] times a week [A change in level of care is needed as evidenced by:] : A change in level of care is needed as evidenced by: [None - Reason others did not participate:] : None - Reason others did not participate:  [Therapist] : Therapist [Tobacco Screening Completed?] : Tobacco Screening Completed: Yes [No] : No [Yes: Details:___] : Yes: [unfilled] [Completed, copy requested] : Completed, copy requested [Obtained and reviewed] : Obtained and reviewed [Yes] : Yes [Patient has been tested for HIV?] : Patient has been tested for HIV [Negative] : Negative [Patient has been tested for Hepatitis?] : Patient has been tested for hepatitis [Unknown] : Unknown [Current or past COVID-19 diagnosis?] : Patient had a present or past COVID-19 diagnosis [Vaccinated?] : is vaccinated [Date of last vaccination (mm/dd/yy):___] : date of last vaccination: [unfilled] [Education provided about COVID-19?] : Education provided about COVID-19 [Patient is not prescribed antipsychotic medication] : Patient is not prescribed antipsychotic medication [Does patient use tobacco products?] : Patient does not use tobacco products [Does patient use medical marijuana?] : Patient does not use medical marijuana. 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 improve "emotional stability"  [de-identified] : Describe situations, thoughts, feelings, and actions associated with anxieties and worries, their impact on functioning, and  attempts to resolve them. [de-identified] : 8/1/25 [de-identified] : pt has improved in ability to name these himself, but on an emotional level continues to struggle to fully manage the impacts. [de-identified] : Establish an inward sense of self-worth, confidence, and competence, as reported by pt. Increase ability to forgive self for failures as indicated by SOS value of 5 [de-identified] : 8/1/25 [FreeTextEntry5] : individual psychoanalytic psychotherapy, 45 minutes/session 2x/week; interpersonal process group 60 minutes per week; medication management sessions [de-identified] : Dr. Carlos Tucker [de-identified] : complete symptomatic remission with no adjustment in medications over 1 year [de-identified] : [facility] weekly interpersonal process group [Does patient require any additional health services or referrals?] : Does patient require any additional health services or referrals: No

## 2024-09-03 NOTE — DISCUSSION/SUMMARY
[Initial Plan] : Initial Plan [Able to manage surrounding demands and opportunities] : able to manage surrounding demands and opportunities [Able to exercise self-direction] : able to exercise self-direction [Able to set and pursue goals] : able to set and pursue goals [Adherent to treatment recommendations] : adherent to treatment recommendations [Articulate] : articulate [Attempting to realize their potential] : Attempting to realize their potential [Cognitively intact] : cognitively intact [Good impulse control] : good impulse control [Insightful] : insightful [Intelligent] : intelligent [Motivated to participate in treatment] : motivated to participate in treatment [Health literacy] : health literacy [Motivated to maintain or improve physical health] : motivated to maintain or improve physical health [In good health] : in good health [Financially stable] : financially stable [Has vocational interests or hobbies] : has vocational interests or hobbies [Part of a supportive marriage] : part of a supportive marriage [Part of a supportive family] : part of a supportive family [Housing stability] : housing stability [Civic organizations] : civic organizations [High level of education] : high level of education [English fluency] : English fluency [Connected to healthcare] : connected to healthcare [Access to safe outdoor spaces] : access to safe outdoor spaces [Physical Health] : Physical Health [Occupational/Educational] : Occupational/Educational [Interpersonal Relationships] : Interpersonal Relationships [Mental Health] : Mental Health [Initial] : Initial [every ___ weeks] : every [unfilled] weeks [___ times a week] : [unfilled] times a week [A change in level of care is needed as evidenced by:] : A change in level of care is needed as evidenced by: [None - Reason others did not participate:] : None - Reason others did not participate:  [Therapist] : Therapist [Tobacco Screening Completed?] : Tobacco Screening Completed: Yes [No] : No [Yes: Details:___] : Yes: [unfilled] [Completed, copy requested] : Completed, copy requested [Obtained and reviewed] : Obtained and reviewed [Yes] : Yes [Patient has been tested for HIV?] : Patient has been tested for HIV [Negative] : Negative [Patient has been tested for Hepatitis?] : Patient has been tested for hepatitis [Unknown] : Unknown [Current or past COVID-19 diagnosis?] : Patient had a present or past COVID-19 diagnosis [Vaccinated?] : is vaccinated [Date of last vaccination (mm/dd/yy):___] : date of last vaccination: [unfilled] [Education provided about COVID-19?] : Education provided about COVID-19 [Patient is not prescribed antipsychotic medication] : Patient is not prescribed antipsychotic medication [Does patient use tobacco products?] : Patient does not use tobacco products [Does patient use medical marijuana?] : Patient does not use medical marijuana. 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 improve "emotional stability"  [de-identified] : Describe situations, thoughts, feelings, and actions associated with anxieties and worries, their impact on functioning, and  attempts to resolve them. [de-identified] : 8/1/25 [de-identified] : pt has improved in ability to name these himself, but on an emotional level continues to struggle to fully manage the impacts. [de-identified] : Establish an inward sense of self-worth, confidence, and competence, as reported by pt. Increase ability to forgive self for failures as indicated by SOS value of 5 [de-identified] : 8/1/25 [FreeTextEntry5] : individual psychoanalytic psychotherapy, 45 minutes/session 2x/week; interpersonal process group 60 minutes per week; medication management sessions [de-identified] : Dr. Carlos Tucker [de-identified] : [facility] weekly interpersonal process group [de-identified] : complete symptomatic remission with no adjustment in medications over 1 year [Does patient require any additional health services or referrals?] : Does patient require any additional health services or referrals: No

## 2024-09-04 ENCOUNTER — APPOINTMENT (OUTPATIENT)
Dept: PSYCHIATRY | Facility: CLINIC | Age: 59
End: 2024-09-04
Payer: MEDICAID

## 2024-09-04 PROCEDURE — 90834 PSYTX W PT 45 MINUTES: CPT | Mod: 95

## 2024-09-04 NOTE — PHYSICAL EXAM
[Well groomed] : well groomed [Average] : average [Cooperative] : cooperative [Anxious] : anxious [Full] : full [Rapid] : rapid [Linear/Goal Directed] : linear/goal directed [WNL] : within normal limits [FreeTextEntry8] : frustrated [de-identified] : tearful at times

## 2024-09-04 NOTE — PLAN
[FreeTextEntry2] : treatment will focus on supporting pt achieving his goals getting work, improving his mood, socializing more, decreasing perfectionism, and increasing motivation. will revisit treatment plan and duration in August. [Psychodynamic Therapy] : Psychodynamic Therapy  [Psychoeducation] : Psychoeducation  [de-identified] : Pt arrived on time for session. 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 Explored dynamics related to pt's need for feedback and need to be evaluated positively, his hunger for validation. Pt open to and appreciative of interventions. Session concluded with pt in good emotional control.  [Recommended Frequency of Visits: ____] : Recommended frequency of visits: [unfilled] [Return in ____ week(s)] : Return in [unfilled] week(s) [FreeTextEntry1] : continue twice weekly psychotherapy, medication management, modern masculinities group.

## 2024-09-07 NOTE — REASON FOR VISIT
no [Patient preference] : as per patient preference [Continuing, patient seen in-person within last 12 months] : Telehealth services are continuing as patient has been seen in-person within last 12 months. [Telehealth (audio & video) - Individual/Group] : This visit was provided via telehealth using real-time 2-way audio visual technology. [Other Location: e.g. Home (Enter Location, City,State)___] : The provider was located at [unfilled]. [Home] : The patient, [unfilled], was located at home, [unfilled], at the time of the visit. [Verbal consent obtained from patient/other participant(s)] : Verbal consent for telehealth/telephonic services obtained from patient/other participant(s) [FreeTextEntry4] : 3pm [FreeTextEntry5] : 345pm [FreeTextEntry2] : 8/14/23 [Patient] : Patient [FreeTextEntry1] : Pt requests psychotherapy to focus on anhedonia, low motivation, feelings of "stuckness", processing his father's recent death, feelings of humiliation and guilt, perfectionism

## 2024-09-09 ENCOUNTER — APPOINTMENT (OUTPATIENT)
Dept: PSYCHIATRY | Facility: CLINIC | Age: 59
End: 2024-09-09
Payer: MEDICAID

## 2024-09-09 PROCEDURE — 90853 GROUP PSYCHOTHERAPY: CPT | Mod: 95

## 2024-09-09 PROCEDURE — 90834 PSYTX W PT 45 MINUTES: CPT | Mod: 59

## 2024-09-09 NOTE — REASON FOR VISIT
[Continuity of care] : to ensure continuity of care [Other:___] : due to [unfilled] [Continuing, patient seen in-person within last 12 months] : Telehealth services are continuing as patient has been seen in-person within last 12 months. [Telehealth (audio & video) - Individual/Group] : This visit was provided via telehealth using real-time 2-way audio visual technology. [Medical Office: ([facility])___] : The provider was located at the medical office in [unfilled]. [Home] : The patient, [unfilled], was located at home, [unfilled], at the time of the visit. [Verbal consent obtained from patient/other participant(s)] : Verbal consent for telehealth/telephonic services obtained from patient/other participant(s) [FreeTextEntry4] : 5pm [FreeTextEntry5] : 6pm

## 2024-09-09 NOTE — PLAN
[FreeTextEntry2] : treatment will focus on supporting pt achieving his goals getting work, improving his mood, socializing more, decreasing perfectionism, and increasing motivation. will revisit treatment plan and duration in August. [Psychodynamic Therapy] : Psychodynamic Therapy  [Psychoeducation] : Psychoeducation  [de-identified] : Pt arrived on time for session. Session focused on exploring recent interpersonal moments and reflecting on patterns of behavior. Utilized mentalization to support pt in considering how others might receive him. Explored the role of his insecurity in these interactions and how it can often manifest as anxiety or desperation for connection. Provided insight into how this relates to his relationship with his depressed mother. Pt open to and appreciative of interventions including open-ended questioning, free association, interpretation, confrontation and transference analysis. Session concluded with pt in good emotional control.  [Recommended Frequency of Visits: ____] : Recommended frequency of visits: [unfilled] [Return in ____ week(s)] : Return in [unfilled] week(s) [FreeTextEntry1] : continue twice weekly psychotherapy, medication management, modern masculinities group.

## 2024-09-09 NOTE — DISCUSSION/SUMMARY
[Once a week] : once a week [60 minutes] : 60 minutes [de-identified] : Modern Masculinities Group [FreeTextEntry8] : Number of patients in group: 4 1. Discuss/Explore issues regarding experiences arising from being socialized as male including the impact on relationships and vulnerability  2. Develop relational insight into pt's social impact on others and better understand patterns related to the impact of others on them. Learn/develop boundaries in social situations  3. Learn to identify and tolerate a wider range of emotions and develop language around the discussion of emotions.  4. Develop techniques to self regulate/self soothe in overwhelming circumstances, and how to learn to rely on others for co-regulation.  [FreeTextEntry4] : Pt arrived on time for group therapy. He was active and engaged throughout group. He shared his reactions to other group members' life experiences and shared about his own recent foot surgery and how his recovery is going. He also shared about his relationships with his  parents and his childhood. He left in good emotional and behavioral control.   [de-identified] : Plan: continue twice weekly therapy, medication management, group therapy.

## 2024-09-09 NOTE — PHYSICAL EXAM
[Well groomed] : well groomed [Average] : average [Cooperative] : cooperative [Anxious] : anxious [Full] : full [Rapid] : rapid [Linear/Goal Directed] : linear/goal directed [WNL] : within normal limits [FreeTextEntry8] : frustrated [de-identified] : tearful at times

## 2024-09-11 ENCOUNTER — APPOINTMENT (OUTPATIENT)
Dept: PSYCHIATRY | Facility: CLINIC | Age: 59
End: 2024-09-11
Payer: MEDICAID

## 2024-09-11 PROCEDURE — 90834 PSYTX W PT 45 MINUTES: CPT | Mod: 95

## 2024-09-11 NOTE — PLAN
[FreeTextEntry2] : treatment will focus on supporting pt achieving his goals getting work, improving his mood, socializing more, decreasing perfectionism, and increasing motivation. will revisit treatment plan and duration in August. [Psychodynamic Therapy] : Psychodynamic Therapy  [Psychoeducation] : Psychoeducation  [de-identified] : Pt arrived on time for session. Session focused on exploring pt's relationship with his mother and the impact of her depression on his personality and interpersonal functioning. Explored how these dynamics manifest in the therapeutic relationship. Pt open to and appreciative of interventions including free association, clarification, interpretation, validation, and transference analysis. Session concluded with pt in good emotional control.  [Recommended Frequency of Visits: ____] : Recommended frequency of visits: [unfilled] [Return in ____ week(s)] : Return in [unfilled] week(s) [FreeTextEntry1] : continue twice weekly psychotherapy, medication management, modern masculinities group.

## 2024-09-11 NOTE — PHYSICAL EXAM
[Well groomed] : well groomed [Average] : average [Cooperative] : cooperative [Anxious] : anxious [Full] : full [Rapid] : rapid [Linear/Goal Directed] : linear/goal directed [WNL] : within normal limits [FreeTextEntry8] : frustrated [de-identified] : tearful at times

## 2024-09-11 NOTE — REASON FOR VISIT
[Starting, patient seen in-person within last 6 months] : Telehealth services are being started as patient has seen in-person within last 6 months. [Telehealth (audio & video) - Individual/Group] : This visit was provided via telehealth using real-time 2-way audio visual technology. [Other Location: e.g. Home (Enter Location, City,State)___] : The provider was located at [unfilled]. [Home] : The patient, [unfilled], was located at home, [unfilled], at the time of the visit. [Verbal consent obtained from patient/other participant(s)] : Verbal consent for telehealth/telephonic services obtained from patient/other participant(s) [FreeTextEntry4] : 12pm [FreeTextEntry5] : 8526wx [FreeTextEntry2] : 7/1/24 [Patient] : Patient [FreeTextEntry1] : Pt requests psychotherapy to focus on anhedonia, low motivation, feelings of "stuckness", processing his father's recent death, feelings of humiliation and guilt, perfectionism

## 2024-09-16 ENCOUNTER — APPOINTMENT (OUTPATIENT)
Dept: PSYCHIATRY | Facility: CLINIC | Age: 59
End: 2024-09-16
Payer: MEDICAID

## 2024-09-16 PROCEDURE — 90834 PSYTX W PT 45 MINUTES: CPT | Mod: 59

## 2024-09-16 PROCEDURE — 90853 GROUP PSYCHOTHERAPY: CPT

## 2024-09-16 NOTE — PHYSICAL EXAM
[Well groomed] : well groomed [Average] : average [Cooperative] : cooperative [Anxious] : anxious [Full] : full [Rapid] : rapid [Linear/Goal Directed] : linear/goal directed [WNL] : within normal limits [FreeTextEntry8] : frustrated [de-identified] : tearful at times

## 2024-09-16 NOTE — PLAN
[FreeTextEntry2] : treatment will focus on supporting pt achieving his goals getting work, improving his mood, socializing more, decreasing perfectionism, and increasing motivation. will revisit treatment plan and duration in August. [Psychodynamic Therapy] : Psychodynamic Therapy  [Psychoeducation] : Psychoeducation  [de-identified] : Pt arrived on time for session. Session focused on pt's increased motivation toward obtaining work and increased social engagement. Explored his experience of having "wasted talents," and how this has induced increased frustration with his current occupational situation. Pt open to and appreciative of interventions including open-ended questioning, free association, interpretation, confrontation and transference analysis. Session concluded with pt in good emotional control.  [Recommended Frequency of Visits: ____] : Recommended frequency of visits: [unfilled] [Return in ____ week(s)] : Return in [unfilled] week(s) [FreeTextEntry1] : continue twice weekly psychotherapy, medication management, modern masculinities group.

## 2024-09-18 ENCOUNTER — APPOINTMENT (OUTPATIENT)
Dept: PSYCHIATRY | Facility: CLINIC | Age: 59
End: 2024-09-18
Payer: MEDICAID

## 2024-09-18 DIAGNOSIS — F41.1 GENERALIZED ANXIETY DISORDER: ICD-10-CM

## 2024-09-18 PROCEDURE — 99215 OFFICE O/P EST HI 40 MIN: CPT | Mod: 95

## 2024-09-18 PROCEDURE — 90834 PSYTX W PT 45 MINUTES: CPT | Mod: 95

## 2024-09-18 PROCEDURE — G2211 COMPLEX E/M VISIT ADD ON: CPT | Mod: NC,95

## 2024-09-18 NOTE — PHYSICAL EXAM
[Average] : average [Cooperative] : cooperative [Full] : full [Clear] : clear [Linear/Goal Directed] : linear/goal directed [WNL] : within normal limits [No] : No [FreeTextEntry8] : good [de-identified] : chronically fast paced in anxious way [Individual reports tobacco use during the last 30 days?] : Individual reports tobacco use during the last 30 days? No

## 2024-09-18 NOTE — SOCIAL HISTORY
[FreeTextEntry1] : Pt has bachelor's degree in history, worked in beauty/cosmetic industry for 30 years, worked at [employer] but currently unemployed. Works as election poll worker seasonally. , living with . Born [place] raised [place], moved to [place], then [place] then [place], then [place], then [place] in 2009. \par  Denies any abuse history.\par

## 2024-09-18 NOTE — HISTORY OF PRESENT ILLNESS
[Not Applicable] : Not applicable [FreeTextEntry1] : Initially presented to [facility] for continued treatment of BIpolar Disorder unspecified and anxiety.  [FreeTextEntry2] : Dx with bipolar disorder in 2016, however no clear manic episode reported in lifetime. No IPP admissions in lifetime, saw 3 prior psychiatrists, last time was in 2017 - PMD continued prescribing psychiatric medications since then.  [FreeTextEntry3] : was first started on medications in 6680-1803: wellbutrin ER 300mg, lamotrigine 25 mg daily (started at 50mg, then brought down to 25mg), zolpidem 10mg PRN (1/2 of 1 PRN, says 30 days lasts 3 months. Denies any other medication trials.

## 2024-09-18 NOTE — DISCUSSION/SUMMARY
[Annual Review of Systems Completed?] : Annual Review of Systems Completed: Yes [Tobacco Screening Completed?] : Tobacco Screening Completed: Yes [Date of Last Physical Exam: _____] : Date of Last Physical Exam: [unfilled] [Date of Last Annual Labs: _____] : Date of Last Annual Labs: [unfilled] [FreeTextEntry1] : Patient is a 58 year old white male, currently unemployed, former executive in cosmetics industry, domiciled with , with reported psychiatric history of bipolar disorder diagnosed in 2016, with no IPP admissions, initially presented to clinic to re-establish psychiatric care after having last seen psychiatrist in 2017 for medication management as well as therapy, presenting today for follow-up. On initial evaluation, patient had presented with symptoms suggestive of Generalized Anxiety Disorder, but did not appear acutely depressed, suicidal, manic, or psychotic. While patient reported history of Bipolar 1 Disorder, he did not report any lifetime manic episodes and patient's medication regimen at presentation (Lamictal 25 mg daily) would not likely have been effective in treatment of Bipolar 1 Disorder.   Even so, he was having frequent mood instability that improved with titration upwards of Lamictal so a Bipolar 2 or unspecified possible     Dx: Bipolar Spectrum Disorder  Patient overall psychiatrically stable with low level anxiety and minimal to mild dysphoria.

## 2024-09-18 NOTE — PHYSICAL EXAM
[Well groomed] : well groomed [Average] : average [Cooperative] : cooperative [Anxious] : anxious [Full] : full [Rapid] : rapid [Linear/Goal Directed] : linear/goal directed [WNL] : within normal limits [FreeTextEntry8] : frustrated [de-identified] : tearful at times

## 2024-09-18 NOTE — SOCIAL HISTORY
[FreeTextEntry1] : Pt has bachelor's degree in history, worked in beauty/cosmetic industry for 30 years, worked at [employer] but currently unemployed. Works as election poll worker seasonally. , living with . Born [place] raised [place], moved to [place], then [place], then [place], then [place], then [place] in 2009. \par  Denies any abuse history.\par

## 2024-09-18 NOTE — HISTORY OF PRESENT ILLNESS
[Not Applicable] : Not applicable [FreeTextEntry1] : Initially presented to [facility] for continued treatment of BIpolar Disorder unspecified and anxiety.  [FreeTextEntry2] : Dx with bipolar disorder in 2016, however no clear manic episode reported in lifetime. No IPP admissions in lifetime, saw 3 prior psychiatrists, last time was in 2017 - PMD continued prescribing psychiatric medications since then.  [FreeTextEntry3] : was first started on medications in 2992-6646: wellbutrin ER 300mg, lamotrigine 25 mg daily (started at 50mg, then brought down to 25mg), zolpidem 10mg PRN (1/2 of 1 PRN, says 30 days lasts 3 months. Denies any other medication trials.

## 2024-09-18 NOTE — PLAN
[Psychodynamic Therapy] : Psychodynamic Therapy  [Psychoeducation] : Psychoeducation  [Recommended Frequency of Visits: ____] : Recommended frequency of visits: [unfilled] [Return in ____ week(s)] : Return in [unfilled] week(s) [FreeTextEntry2] : treatment will focus on supporting pt achieving his goals getting work, improving his mood, socializing more, decreasing perfectionism, and increasing motivation. will revisit treatment plan and duration in August. [de-identified] : Pt arrived on time for session. Session focused on processing pt's reaction to rejection from a job he was really hopeful for. Explored feelings of disappointment, anger, frustration, as well as self-punishing and isolating dynamics in his relationships with friends, his partner, and with writer as well. Explored pt's relationship with a friend who has been distancing herself and pt's mixed feelings about that, and avoidance of raising the issue with her. 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 Pt open to and appreciative of interventions including free association, clarification, interpretation, validation, and transference analysis. Session concluded with pt in good emotional control.  [FreeTextEntry1] : continue twice weekly psychotherapy, medication management, modern masculinities group.  No Residual Tumor Seen Histology Text: There were no malignant cells seen in the sections examined.

## 2024-09-18 NOTE — PLAN
[Yes. details: ___] : Yes, [unfilled] [Medication education provided] : Medication education provided. [Rationale for medication choices, possible risks/precautions, benefits, alternative treatment choices, and consequences of non-treatment discussed] : Rationale for medication choices, possible risks/precautions, benefits, alternative treatment choices, and consequences of non-treatment discussed with patient/family/caregiver  [FreeTextEntry5] : Offered support/encouragement, psychoeducation, counseling regarding: diagnosis/diagnoses, medications, symptoms, recommendations etc.  Reviewed/discussed plan below:  -Continue Bupropion XL at 450 mg po daily  -May take hydroxyzine 25 mg prn  -Continue Lamictal at 100 mg daily -To send upcoming PE labs to writer -Continue therapy (is doing twice weekly with Yrn); writer may collaborate with pt's therapist prn -To continue Men's group, Mondays at 5 pm -Contact writer prn  40 minutes spent reviewing prior records/notes, seeing patient and recording session note

## 2024-09-18 NOTE — PHYSICAL EXAM
[Average] : average [Cooperative] : cooperative [Full] : full [Clear] : clear [Linear/Goal Directed] : linear/goal directed [WNL] : within normal limits [No] : No [FreeTextEntry8] : good [de-identified] : chronically fast paced in anxious way [Individual reports tobacco use during the last 30 days?] : Individual reports tobacco use during the last 30 days? No

## 2024-09-18 NOTE — REVIEW OF SYSTEMS
[FreeTextEntry4] : some sporadic respiratory issues [Negative] : ENT [FreeTextEntry9] : mostly recovered from foot surgery [de-identified] : stable

## 2024-09-18 NOTE — REASON FOR VISIT
[Continuity of care] : to ensure continuity of care [Continuing, patient seen in-person within last 12 months] : Telehealth services are continuing as patient has been seen in-person within last 12 months. [Telehealth (audio & video) - Individual/Group] : This visit was provided via telehealth using real-time 2-way audio visual technology. [Other Location: e.g. Home (Enter Location, City,State)___] : The provider was located at [unfilled]. [Home] : The patient, [unfilled], was located at home, [unfilled], at the time of the visit. [Verbal consent obtained from patient/other participant(s)] : Verbal consent for telehealth/telephonic services obtained from patient/other participant(s) [Patient] : Patient [FreeTextEntry4] : 3pm [FreeTextEntry5] : 345pm [FreeTextEntry2] : 9/16/24 [FreeTextEntry1] : Pt requests psychotherapy to focus on anhedonia, low motivation, feelings of "stuckness", processing his father's recent death, feelings of humiliation and guilt, perfectionism

## 2024-09-18 NOTE — REVIEW OF SYSTEMS
[FreeTextEntry4] : some sporadic respiratory issues [Negative] : ENT [FreeTextEntry9] : mostly recovered from foot surgery [de-identified] : stable Home

## 2024-09-23 ENCOUNTER — APPOINTMENT (OUTPATIENT)
Dept: PSYCHIATRY | Facility: CLINIC | Age: 59
End: 2024-09-23
Payer: MEDICAID

## 2024-09-23 PROCEDURE — 90853 GROUP PSYCHOTHERAPY: CPT | Mod: 95,GT

## 2024-09-23 PROCEDURE — 90834 PSYTX W PT 45 MINUTES: CPT | Mod: 59

## 2024-09-23 NOTE — REASON FOR VISIT
[Continuity of care] : to ensure continuity of care [Other:___] : due to [unfilled] [Continuing, patient seen in-person within last 12 months] : Telehealth services are continuing as patient has been seen in-person within last 12 months. [Telehealth (audio & video) - Individual/Group] : This visit was provided via telehealth using real-time 2-way audio visual technology. [Medical Office: ([facility])___] : The provider was located at the medical office in [unfilled]. [Home] : The patient, [unfilled], was located at home, [unfilled], at the time of the visit. [Verbal consent obtained from patient/other participant(s)] : Verbal consent for telehealth/telephonic services obtained from patient/other participant(s) [FreeTextEntry4] : 5pm [FreeTextEntry5] : 6pm

## 2024-09-23 NOTE — PHYSICAL EXAM
[Well groomed] : well groomed [Average] : average [Cooperative] : cooperative [Anxious] : anxious [Full] : full [Rapid] : rapid [Linear/Goal Directed] : linear/goal directed [WNL] : within normal limits [FreeTextEntry8] : frustrated [de-identified] : tearful at times

## 2024-09-23 NOTE — DISCUSSION/SUMMARY
[Once a week] : once a week [60 minutes] : 60 minutes [de-identified] : Modern Masculinities Group [FreeTextEntry8] : Number of patients in group: 3 1. Discuss/Explore issues regarding experiences arising from being socialized as male including the impact on relationships and vulnerability  2. Develop relational insight into pt's social impact on others and better understand patterns related to the impact of others on them. Learn/develop boundaries in social situations  3. Learn to identify and tolerate a wider range of emotions and develop language around the discussion of emotions.  4. Develop techniques to self regulate/self soothe in overwhelming circumstances, and how to learn to rely on others for co-regulation.  [FreeTextEntry4] : Pt arrived on time for group therapy. He was active and engaged throughout group. He shared about his experience growing up [place] the 80s, and various hardships he faced. He was also able to provide feedback to another group member who offended him in their use of language in a kind and respectful way. He was appreciate of other group members' interest and curiosity in his life experience. He left in good emotional and behavioral control.   [de-identified] : Plan: continue twice weekly 45-minute therapy, medication management, 60 minute modern masculinities process group weekly

## 2024-09-23 NOTE — PLAN
[FreeTextEntry2] : treatment will focus on supporting pt achieving his goals getting work, improving his mood, socializing more, decreasing perfectionism, and increasing motivation. will revisit treatment plan and duration in August. [Psychodynamic Therapy] : Psychodynamic Therapy  [Psychoeducation] : Psychoeducation  [de-identified] : Pt arrived on time for session. Session focused on exploring pt's desire for more [place] community, and his history of greater social engagement. Explored various relationships from his past and his desire for new relationships. Explored his fear of vulnerability and ambivalence around "putting himself out there." Explored the impact of his romantic relationship on his limiting his social life more, and the possibility of regret in this domain. Pt open to and appreciative of interventions including open-ended questioning, free association, interpretation, confrontation and transference analysis. Session concluded with pt in good emotional control.  [Recommended Frequency of Visits: ____] : Recommended frequency of visits: [unfilled] [Return in ____ week(s)] : Return in [unfilled] week(s) [FreeTextEntry1] : continue twice weekly 45-minute psychotherapy, medication management, weekly 60minute modern masculinities process group.

## 2024-09-25 ENCOUNTER — APPOINTMENT (OUTPATIENT)
Dept: PSYCHIATRY | Facility: CLINIC | Age: 59
End: 2024-09-25
Payer: MEDICAID

## 2024-09-25 DIAGNOSIS — F31.89 OTHER BIPOLAR DISORDER: ICD-10-CM

## 2024-09-25 PROCEDURE — 90834 PSYTX W PT 45 MINUTES: CPT | Mod: 95

## 2024-09-25 NOTE — PLAN
[FreeTextEntry2] : treatment will focus on supporting pt achieving his goals getting work, improving his mood, socializing more, decreasing perfectionism, and increasing motivation. will revisit treatment plan and duration in August. [Psychodynamic Therapy] : Psychodynamic Therapy  [Psychoeducation] : Psychoeducation  [de-identified] : Pt arrived on time for session. Session focused on processing pt's experience of process group from Monday, and reflecting on a difficult interaction with other group members. Pt able to identify and process various interpersonal emotional experiences and consider his impact on others and reflect on their impact on him. Explored his experience as a [place] man in the 80s and how that impacted his sense of self and his identity. Pt open to and appreciative of interventions including free association, clarification, interpretation, validation, and transference analysis. Session concluded with pt in good emotional control.  [Recommended Frequency of Visits: ____] : Recommended frequency of visits: [unfilled] [Return in ____ week(s)] : Return in [unfilled] week(s) [FreeTextEntry1] : continue twice weekly psychotherapy, medication management, modern masculinities group.

## 2024-09-25 NOTE — REASON FOR VISIT
[Continuity of care] : to ensure continuity of care [Continuing, patient seen in-person within last 12 months] : Telehealth services are continuing as patient has been seen in-person within last 12 months. [Telehealth (audio & video) - Individual/Group] : This visit was provided via telehealth using real-time 2-way audio visual technology. [Other Location: e.g. Home (Enter Location, City,State)___] : The provider was located at [unfilled]. [Home] : The patient, [unfilled], was located at home, [unfilled], at the time of the visit. [Verbal consent obtained from patient/other participant(s)] : Verbal consent for telehealth/telephonic services obtained from patient/other participant(s) [FreeTextEntry4] : 3pm [FreeTextEntry5] : 345pm [FreeTextEntry2] : 9/16/24 [Patient] : Patient [FreeTextEntry1] : Pt requests psychotherapy to focus on anhedonia, low motivation, feelings of "stuckness", processing his father's recent death, feelings of humiliation and guilt, perfectionism

## 2024-09-25 NOTE — PHYSICAL EXAM
[Well groomed] : well groomed [Average] : average [Cooperative] : cooperative [Anxious] : anxious [Full] : full [Rapid] : rapid [Linear/Goal Directed] : linear/goal directed [WNL] : within normal limits [FreeTextEntry8] : frustrated [de-identified] : tearful at times

## 2024-09-30 ENCOUNTER — APPOINTMENT (OUTPATIENT)
Dept: PSYCHIATRY | Facility: CLINIC | Age: 59
End: 2024-09-30

## 2024-10-02 ENCOUNTER — APPOINTMENT (OUTPATIENT)
Dept: PSYCHIATRY | Facility: CLINIC | Age: 59
End: 2024-10-02

## 2024-10-07 ENCOUNTER — APPOINTMENT (OUTPATIENT)
Dept: PSYCHIATRY | Facility: CLINIC | Age: 59
End: 2024-10-07
Payer: MEDICAID

## 2024-10-07 PROCEDURE — 90834 PSYTX W PT 45 MINUTES: CPT | Mod: 59

## 2024-10-07 PROCEDURE — 90853 GROUP PSYCHOTHERAPY: CPT | Mod: 95

## 2024-10-09 ENCOUNTER — APPOINTMENT (OUTPATIENT)
Dept: PSYCHIATRY | Facility: CLINIC | Age: 59
End: 2024-10-09
Payer: MEDICAID

## 2024-10-09 PROBLEM — F31.89 OTHER BIPOLAR DISORDER: Status: ACTIVE | Noted: 2022-10-25

## 2024-10-09 PROCEDURE — 90834 PSYTX W PT 45 MINUTES: CPT | Mod: 95

## 2024-10-14 ENCOUNTER — APPOINTMENT (OUTPATIENT)
Dept: PSYCHIATRY | Facility: CLINIC | Age: 59
End: 2024-10-14
Payer: MEDICAID

## 2024-10-14 PROCEDURE — 90834 PSYTX W PT 45 MINUTES: CPT | Mod: 95,59

## 2024-10-14 PROCEDURE — 90853 GROUP PSYCHOTHERAPY: CPT | Mod: 95

## 2024-10-16 ENCOUNTER — APPOINTMENT (OUTPATIENT)
Dept: PSYCHIATRY | Facility: CLINIC | Age: 59
End: 2024-10-16
Payer: MEDICAID

## 2024-10-16 DIAGNOSIS — F41.1 GENERALIZED ANXIETY DISORDER: ICD-10-CM

## 2024-10-16 DIAGNOSIS — G47.00 INSOMNIA, UNSPECIFIED: ICD-10-CM

## 2024-10-16 PROCEDURE — 99215 OFFICE O/P EST HI 40 MIN: CPT | Mod: 95

## 2024-10-16 PROCEDURE — G2211 COMPLEX E/M VISIT ADD ON: CPT | Mod: 95

## 2024-10-16 PROCEDURE — 90834 PSYTX W PT 45 MINUTES: CPT | Mod: 95

## 2024-10-21 ENCOUNTER — APPOINTMENT (OUTPATIENT)
Dept: PSYCHIATRY | Facility: CLINIC | Age: 59
End: 2024-10-21
Payer: MEDICAID

## 2024-10-21 PROCEDURE — 90834 PSYTX W PT 45 MINUTES: CPT | Mod: 59

## 2024-10-21 PROCEDURE — 90853 GROUP PSYCHOTHERAPY: CPT | Mod: 95

## 2024-10-24 ENCOUNTER — APPOINTMENT (OUTPATIENT)
Dept: PSYCHIATRY | Facility: CLINIC | Age: 59
End: 2024-10-24
Payer: MEDICAID

## 2024-10-24 DIAGNOSIS — F31.89 OTHER BIPOLAR DISORDER: ICD-10-CM

## 2024-10-24 PROCEDURE — 90834 PSYTX W PT 45 MINUTES: CPT | Mod: 95

## 2024-10-28 ENCOUNTER — APPOINTMENT (OUTPATIENT)
Dept: PSYCHIATRY | Facility: CLINIC | Age: 59
End: 2024-10-28

## 2024-10-30 ENCOUNTER — APPOINTMENT (OUTPATIENT)
Dept: PSYCHIATRY | Facility: CLINIC | Age: 59
End: 2024-10-30

## 2024-11-04 ENCOUNTER — APPOINTMENT (OUTPATIENT)
Dept: PSYCHIATRY | Facility: CLINIC | Age: 59
End: 2024-11-04
Payer: MEDICAID

## 2024-11-04 PROCEDURE — 90834 PSYTX W PT 45 MINUTES: CPT | Mod: 59,95

## 2024-11-04 PROCEDURE — 90853 GROUP PSYCHOTHERAPY: CPT | Mod: 95

## 2024-11-06 ENCOUNTER — APPOINTMENT (OUTPATIENT)
Dept: PSYCHIATRY | Facility: CLINIC | Age: 59
End: 2024-11-06

## 2024-11-11 ENCOUNTER — APPOINTMENT (OUTPATIENT)
Dept: PSYCHIATRY | Facility: CLINIC | Age: 59
End: 2024-11-11
Payer: MEDICAID

## 2024-11-11 PROCEDURE — 90834 PSYTX W PT 45 MINUTES: CPT | Mod: 59,95

## 2024-11-11 PROCEDURE — 90853 GROUP PSYCHOTHERAPY: CPT | Mod: 95

## 2024-11-13 ENCOUNTER — NON-APPOINTMENT (OUTPATIENT)
Age: 59
End: 2024-11-13

## 2024-11-13 ENCOUNTER — APPOINTMENT (OUTPATIENT)
Dept: PSYCHIATRY | Facility: CLINIC | Age: 59
End: 2024-11-13
Payer: MEDICAID

## 2024-11-13 PROCEDURE — 90834 PSYTX W PT 45 MINUTES: CPT | Mod: 95

## 2024-11-18 ENCOUNTER — APPOINTMENT (OUTPATIENT)
Dept: PSYCHIATRY | Facility: CLINIC | Age: 59
End: 2024-11-18
Payer: MEDICAID

## 2024-11-18 DIAGNOSIS — F32.9 MAJOR DEPRESSIVE DISORDER, SINGLE EPISODE, UNSPECIFIED: ICD-10-CM

## 2024-11-18 PROCEDURE — 90834 PSYTX W PT 45 MINUTES: CPT | Mod: 59

## 2024-11-18 PROCEDURE — 90853 GROUP PSYCHOTHERAPY: CPT | Mod: 95,GT

## 2024-11-20 ENCOUNTER — APPOINTMENT (OUTPATIENT)
Dept: PSYCHIATRY | Facility: CLINIC | Age: 59
End: 2024-11-20
Payer: MEDICAID

## 2024-11-20 DIAGNOSIS — G47.00 INSOMNIA, UNSPECIFIED: ICD-10-CM

## 2024-11-20 DIAGNOSIS — F41.1 GENERALIZED ANXIETY DISORDER: ICD-10-CM

## 2024-11-20 PROCEDURE — 90834 PSYTX W PT 45 MINUTES: CPT | Mod: 95

## 2024-11-20 PROCEDURE — 99215 OFFICE O/P EST HI 40 MIN: CPT | Mod: 95

## 2024-11-20 PROCEDURE — G2211 COMPLEX E/M VISIT ADD ON: CPT | Mod: 95

## 2024-11-25 ENCOUNTER — APPOINTMENT (OUTPATIENT)
Dept: PSYCHIATRY | Facility: CLINIC | Age: 59
End: 2024-11-25
Payer: MEDICAID

## 2024-11-25 PROCEDURE — 90834 PSYTX W PT 45 MINUTES: CPT | Mod: 59,95

## 2024-11-25 PROCEDURE — 90853 GROUP PSYCHOTHERAPY: CPT | Mod: 95,GT

## 2024-11-27 ENCOUNTER — NON-APPOINTMENT (OUTPATIENT)
Age: 59
End: 2024-11-27

## 2024-11-27 ENCOUNTER — APPOINTMENT (OUTPATIENT)
Dept: PSYCHIATRY | Facility: CLINIC | Age: 59
End: 2024-11-27

## 2024-12-02 ENCOUNTER — APPOINTMENT (OUTPATIENT)
Dept: PSYCHIATRY | Facility: CLINIC | Age: 59
End: 2024-12-02
Payer: MEDICAID

## 2024-12-02 PROCEDURE — 90834 PSYTX W PT 45 MINUTES: CPT | Mod: 59

## 2024-12-02 PROCEDURE — 90853 GROUP PSYCHOTHERAPY: CPT | Mod: 95

## 2024-12-04 ENCOUNTER — APPOINTMENT (OUTPATIENT)
Dept: PSYCHIATRY | Facility: CLINIC | Age: 59
End: 2024-12-04
Payer: MEDICAID

## 2024-12-04 PROCEDURE — 90834 PSYTX W PT 45 MINUTES: CPT | Mod: 95

## 2024-12-09 ENCOUNTER — APPOINTMENT (OUTPATIENT)
Dept: PSYCHIATRY | Facility: CLINIC | Age: 59
End: 2024-12-09
Payer: MEDICAID

## 2024-12-09 PROCEDURE — 90853 GROUP PSYCHOTHERAPY: CPT | Mod: 95

## 2024-12-09 PROCEDURE — 90834 PSYTX W PT 45 MINUTES: CPT

## 2024-12-11 ENCOUNTER — APPOINTMENT (OUTPATIENT)
Dept: PSYCHIATRY | Facility: CLINIC | Age: 59
End: 2024-12-11
Payer: MEDICAID

## 2024-12-11 PROCEDURE — 90834 PSYTX W PT 45 MINUTES: CPT | Mod: 95

## 2024-12-16 ENCOUNTER — APPOINTMENT (OUTPATIENT)
Dept: PSYCHIATRY | Facility: CLINIC | Age: 59
End: 2024-12-16
Payer: MEDICAID

## 2024-12-16 PROCEDURE — 90834 PSYTX W PT 45 MINUTES: CPT | Mod: 59

## 2024-12-16 PROCEDURE — 90853 GROUP PSYCHOTHERAPY: CPT | Mod: 95

## 2024-12-18 ENCOUNTER — APPOINTMENT (OUTPATIENT)
Dept: PSYCHIATRY | Facility: CLINIC | Age: 59
End: 2024-12-18
Payer: MEDICAID

## 2024-12-18 DIAGNOSIS — F41.1 GENERALIZED ANXIETY DISORDER: ICD-10-CM

## 2024-12-18 DIAGNOSIS — G47.00 INSOMNIA, UNSPECIFIED: ICD-10-CM

## 2024-12-18 PROCEDURE — 99215 OFFICE O/P EST HI 40 MIN: CPT | Mod: 95

## 2024-12-18 PROCEDURE — G2211 COMPLEX E/M VISIT ADD ON: CPT | Mod: 95

## 2024-12-18 PROCEDURE — 90834 PSYTX W PT 45 MINUTES: CPT | Mod: 95

## 2024-12-23 ENCOUNTER — APPOINTMENT (OUTPATIENT)
Dept: PSYCHIATRY | Facility: CLINIC | Age: 59
End: 2024-12-23

## 2024-12-24 ENCOUNTER — APPOINTMENT (OUTPATIENT)
Dept: PSYCHIATRY | Facility: CLINIC | Age: 59
End: 2024-12-24
Payer: MEDICAID

## 2024-12-24 DIAGNOSIS — F31.89 OTHER BIPOLAR DISORDER: ICD-10-CM

## 2024-12-24 PROCEDURE — 90834 PSYTX W PT 45 MINUTES: CPT | Mod: 95

## 2024-12-30 ENCOUNTER — APPOINTMENT (OUTPATIENT)
Dept: PSYCHIATRY | Facility: CLINIC | Age: 59
End: 2024-12-30

## 2025-01-06 ENCOUNTER — APPOINTMENT (OUTPATIENT)
Dept: PSYCHIATRY | Facility: CLINIC | Age: 60
End: 2025-01-06
Payer: MEDICAID

## 2025-01-06 PROCEDURE — 90834 PSYTX W PT 45 MINUTES: CPT | Mod: 59

## 2025-01-06 PROCEDURE — 90853 GROUP PSYCHOTHERAPY: CPT | Mod: 95,GT

## 2025-01-08 ENCOUNTER — APPOINTMENT (OUTPATIENT)
Dept: PSYCHIATRY | Facility: CLINIC | Age: 60
End: 2025-01-08

## 2025-01-08 PROCEDURE — 90834 PSYTX W PT 45 MINUTES: CPT | Mod: 95

## 2025-01-13 ENCOUNTER — NON-APPOINTMENT (OUTPATIENT)
Age: 60
End: 2025-01-13

## 2025-01-13 ENCOUNTER — APPOINTMENT (OUTPATIENT)
Dept: PSYCHIATRY | Facility: CLINIC | Age: 60
End: 2025-01-13

## 2025-01-15 ENCOUNTER — APPOINTMENT (OUTPATIENT)
Dept: PSYCHIATRY | Facility: CLINIC | Age: 60
End: 2025-01-15
Payer: MEDICAID

## 2025-01-15 PROCEDURE — 90834 PSYTX W PT 45 MINUTES: CPT | Mod: 95

## 2025-01-22 ENCOUNTER — APPOINTMENT (OUTPATIENT)
Dept: PSYCHIATRY | Facility: CLINIC | Age: 60
End: 2025-01-22
Payer: MEDICAID

## 2025-01-22 DIAGNOSIS — G47.00 INSOMNIA, UNSPECIFIED: ICD-10-CM

## 2025-01-22 DIAGNOSIS — F41.1 GENERALIZED ANXIETY DISORDER: ICD-10-CM

## 2025-01-22 PROCEDURE — G2211 COMPLEX E/M VISIT ADD ON: CPT | Mod: 95

## 2025-01-22 PROCEDURE — 99214 OFFICE O/P EST MOD 30 MIN: CPT | Mod: 95

## 2025-01-22 PROCEDURE — 90834 PSYTX W PT 45 MINUTES: CPT | Mod: 95

## 2025-01-27 ENCOUNTER — APPOINTMENT (OUTPATIENT)
Dept: PSYCHIATRY | Facility: CLINIC | Age: 60
End: 2025-01-27
Payer: MEDICAID

## 2025-01-27 PROCEDURE — 90853 GROUP PSYCHOTHERAPY: CPT | Mod: 95

## 2025-01-27 PROCEDURE — 90834 PSYTX W PT 45 MINUTES: CPT | Mod: 59

## 2025-01-29 ENCOUNTER — APPOINTMENT (OUTPATIENT)
Dept: PSYCHIATRY | Facility: CLINIC | Age: 60
End: 2025-01-29
Payer: MEDICAID

## 2025-01-29 PROCEDURE — 90834 PSYTX W PT 45 MINUTES: CPT | Mod: 95

## 2025-02-03 ENCOUNTER — APPOINTMENT (OUTPATIENT)
Dept: PSYCHIATRY | Facility: CLINIC | Age: 60
End: 2025-02-03
Payer: MEDICAID

## 2025-02-03 PROCEDURE — 90834 PSYTX W PT 45 MINUTES: CPT | Mod: 59,95

## 2025-02-03 PROCEDURE — 90853 GROUP PSYCHOTHERAPY: CPT | Mod: 95

## 2025-02-05 ENCOUNTER — APPOINTMENT (OUTPATIENT)
Dept: PSYCHIATRY | Facility: CLINIC | Age: 60
End: 2025-02-05
Payer: MEDICAID

## 2025-02-05 PROCEDURE — 90834 PSYTX W PT 45 MINUTES: CPT | Mod: 95

## 2025-02-10 ENCOUNTER — APPOINTMENT (OUTPATIENT)
Dept: PSYCHIATRY | Facility: CLINIC | Age: 60
End: 2025-02-10
Payer: MEDICAID

## 2025-02-10 PROCEDURE — 90834 PSYTX W PT 45 MINUTES: CPT | Mod: 59,95

## 2025-02-10 PROCEDURE — 90853 GROUP PSYCHOTHERAPY: CPT | Mod: 95

## 2025-02-12 ENCOUNTER — NON-APPOINTMENT (OUTPATIENT)
Age: 60
End: 2025-02-12

## 2025-02-12 ENCOUNTER — APPOINTMENT (OUTPATIENT)
Dept: PSYCHIATRY | Facility: CLINIC | Age: 60
End: 2025-02-12
Payer: MEDICAID

## 2025-02-12 PROCEDURE — 90834 PSYTX W PT 45 MINUTES: CPT | Mod: 95

## 2025-02-19 ENCOUNTER — APPOINTMENT (OUTPATIENT)
Dept: PSYCHIATRY | Facility: CLINIC | Age: 60
End: 2025-02-19
Payer: MEDICAID

## 2025-02-19 DIAGNOSIS — F41.1 GENERALIZED ANXIETY DISORDER: ICD-10-CM

## 2025-02-19 PROCEDURE — 99214 OFFICE O/P EST MOD 30 MIN: CPT | Mod: 95

## 2025-02-19 PROCEDURE — G2211 COMPLEX E/M VISIT ADD ON: CPT | Mod: 95

## 2025-02-24 ENCOUNTER — APPOINTMENT (OUTPATIENT)
Dept: PSYCHIATRY | Facility: CLINIC | Age: 60
End: 2025-02-24
Payer: MEDICAID

## 2025-02-24 PROCEDURE — 90834 PSYTX W PT 45 MINUTES: CPT | Mod: 59,95

## 2025-02-24 PROCEDURE — 90853 GROUP PSYCHOTHERAPY: CPT | Mod: 95

## 2025-02-26 ENCOUNTER — APPOINTMENT (OUTPATIENT)
Dept: PSYCHIATRY | Facility: CLINIC | Age: 60
End: 2025-02-26
Payer: MEDICAID

## 2025-02-26 PROCEDURE — 90834 PSYTX W PT 45 MINUTES: CPT | Mod: 95

## 2025-03-03 ENCOUNTER — APPOINTMENT (OUTPATIENT)
Dept: PSYCHIATRY | Facility: CLINIC | Age: 60
End: 2025-03-03
Payer: MEDICAID

## 2025-03-03 PROCEDURE — 90853 GROUP PSYCHOTHERAPY: CPT | Mod: 95

## 2025-03-03 PROCEDURE — 90834 PSYTX W PT 45 MINUTES: CPT | Mod: 59

## 2025-03-05 ENCOUNTER — APPOINTMENT (OUTPATIENT)
Dept: PSYCHIATRY | Facility: CLINIC | Age: 60
End: 2025-03-05
Payer: MEDICAID

## 2025-03-05 PROCEDURE — 90834 PSYTX W PT 45 MINUTES: CPT | Mod: 95

## 2025-03-10 ENCOUNTER — APPOINTMENT (OUTPATIENT)
Dept: PSYCHIATRY | Facility: CLINIC | Age: 60
End: 2025-03-10
Payer: MEDICAID

## 2025-03-10 PROCEDURE — 90834 PSYTX W PT 45 MINUTES: CPT | Mod: 59

## 2025-03-10 PROCEDURE — 90853 GROUP PSYCHOTHERAPY: CPT | Mod: 95

## 2025-03-12 ENCOUNTER — APPOINTMENT (OUTPATIENT)
Dept: PSYCHIATRY | Facility: CLINIC | Age: 60
End: 2025-03-12
Payer: MEDICAID

## 2025-03-12 PROCEDURE — 90834 PSYTX W PT 45 MINUTES: CPT | Mod: 95

## 2025-03-17 ENCOUNTER — APPOINTMENT (OUTPATIENT)
Dept: PSYCHIATRY | Facility: CLINIC | Age: 60
End: 2025-03-17
Payer: MEDICAID

## 2025-03-17 PROCEDURE — 90853 GROUP PSYCHOTHERAPY: CPT | Mod: 95

## 2025-03-17 PROCEDURE — 90834 PSYTX W PT 45 MINUTES: CPT | Mod: 59

## 2025-03-19 ENCOUNTER — APPOINTMENT (OUTPATIENT)
Dept: PSYCHIATRY | Facility: CLINIC | Age: 60
End: 2025-03-19
Payer: MEDICAID

## 2025-03-19 DIAGNOSIS — F41.1 GENERALIZED ANXIETY DISORDER: ICD-10-CM

## 2025-03-19 PROCEDURE — 99214 OFFICE O/P EST MOD 30 MIN: CPT | Mod: 95

## 2025-03-19 PROCEDURE — G2211 COMPLEX E/M VISIT ADD ON: CPT | Mod: NC,95

## 2025-03-24 ENCOUNTER — NON-APPOINTMENT (OUTPATIENT)
Age: 60
End: 2025-03-24

## 2025-03-24 ENCOUNTER — APPOINTMENT (OUTPATIENT)
Dept: PSYCHIATRY | Facility: CLINIC | Age: 60
End: 2025-03-24

## 2025-03-26 ENCOUNTER — APPOINTMENT (OUTPATIENT)
Dept: PSYCHIATRY | Facility: CLINIC | Age: 60
End: 2025-03-26
Payer: MEDICAID

## 2025-03-26 PROCEDURE — 90834 PSYTX W PT 45 MINUTES: CPT | Mod: 95

## 2025-03-31 ENCOUNTER — APPOINTMENT (OUTPATIENT)
Dept: PSYCHIATRY | Facility: CLINIC | Age: 60
End: 2025-03-31
Payer: MEDICAID

## 2025-03-31 PROCEDURE — 90834 PSYTX W PT 45 MINUTES: CPT | Mod: 59,95

## 2025-03-31 PROCEDURE — 90853 GROUP PSYCHOTHERAPY: CPT | Mod: 95

## 2025-04-02 ENCOUNTER — APPOINTMENT (OUTPATIENT)
Dept: PSYCHIATRY | Facility: CLINIC | Age: 60
End: 2025-04-02
Payer: MEDICAID

## 2025-04-02 PROCEDURE — 90834 PSYTX W PT 45 MINUTES: CPT | Mod: 95

## 2025-04-07 ENCOUNTER — APPOINTMENT (OUTPATIENT)
Dept: PSYCHIATRY | Facility: CLINIC | Age: 60
End: 2025-04-07
Payer: MEDICAID

## 2025-04-07 PROCEDURE — 90834 PSYTX W PT 45 MINUTES: CPT | Mod: 59,95

## 2025-04-07 PROCEDURE — 90853 GROUP PSYCHOTHERAPY: CPT | Mod: 95

## 2025-04-09 ENCOUNTER — APPOINTMENT (OUTPATIENT)
Dept: PSYCHIATRY | Facility: CLINIC | Age: 60
End: 2025-04-09
Payer: MEDICAID

## 2025-04-09 PROCEDURE — 90834 PSYTX W PT 45 MINUTES: CPT | Mod: 95

## 2025-04-14 ENCOUNTER — APPOINTMENT (OUTPATIENT)
Dept: PSYCHIATRY | Facility: CLINIC | Age: 60
End: 2025-04-14
Payer: MEDICAID

## 2025-04-14 PROCEDURE — 90853 GROUP PSYCHOTHERAPY: CPT | Mod: 95

## 2025-04-14 PROCEDURE — 90834 PSYTX W PT 45 MINUTES: CPT | Mod: 59

## 2025-04-16 ENCOUNTER — APPOINTMENT (OUTPATIENT)
Dept: PSYCHIATRY | Facility: CLINIC | Age: 60
End: 2025-04-16
Payer: MEDICAID

## 2025-04-16 PROCEDURE — 90834 PSYTX W PT 45 MINUTES: CPT | Mod: 95

## 2025-04-21 ENCOUNTER — APPOINTMENT (OUTPATIENT)
Dept: PSYCHIATRY | Facility: CLINIC | Age: 60
End: 2025-04-21
Payer: MEDICAID

## 2025-04-21 PROCEDURE — 90834 PSYTX W PT 45 MINUTES: CPT | Mod: 59

## 2025-04-21 PROCEDURE — 90853 GROUP PSYCHOTHERAPY: CPT | Mod: 95

## 2025-04-23 ENCOUNTER — APPOINTMENT (OUTPATIENT)
Dept: PSYCHIATRY | Facility: CLINIC | Age: 60
End: 2025-04-23
Payer: MEDICAID

## 2025-04-23 DIAGNOSIS — G47.00 INSOMNIA, UNSPECIFIED: ICD-10-CM

## 2025-04-23 DIAGNOSIS — F41.1 GENERALIZED ANXIETY DISORDER: ICD-10-CM

## 2025-04-23 PROCEDURE — 90834 PSYTX W PT 45 MINUTES: CPT | Mod: 95

## 2025-04-23 PROCEDURE — 99214 OFFICE O/P EST MOD 30 MIN: CPT | Mod: 95

## 2025-04-23 PROCEDURE — G2211 COMPLEX E/M VISIT ADD ON: CPT | Mod: NC,95

## 2025-04-28 ENCOUNTER — APPOINTMENT (OUTPATIENT)
Dept: PSYCHIATRY | Facility: CLINIC | Age: 60
End: 2025-04-28
Payer: MEDICAID

## 2025-04-28 PROCEDURE — 90853 GROUP PSYCHOTHERAPY: CPT | Mod: 95

## 2025-04-28 PROCEDURE — 90834 PSYTX W PT 45 MINUTES: CPT | Mod: 59

## 2025-04-30 ENCOUNTER — APPOINTMENT (OUTPATIENT)
Dept: PSYCHIATRY | Facility: CLINIC | Age: 60
End: 2025-04-30
Payer: MEDICAID

## 2025-04-30 PROCEDURE — 90834 PSYTX W PT 45 MINUTES: CPT | Mod: 95

## 2025-05-05 ENCOUNTER — APPOINTMENT (OUTPATIENT)
Dept: PSYCHIATRY | Facility: CLINIC | Age: 60
End: 2025-05-05

## 2025-05-07 ENCOUNTER — APPOINTMENT (OUTPATIENT)
Dept: PSYCHIATRY | Facility: CLINIC | Age: 60
End: 2025-05-07
Payer: MEDICAID

## 2025-05-07 PROCEDURE — 90834 PSYTX W PT 45 MINUTES: CPT | Mod: 95

## 2025-05-12 ENCOUNTER — APPOINTMENT (OUTPATIENT)
Dept: PSYCHIATRY | Facility: CLINIC | Age: 60
End: 2025-05-12
Payer: MEDICAID

## 2025-05-12 PROCEDURE — 90834 PSYTX W PT 45 MINUTES: CPT | Mod: 59,95

## 2025-05-12 PROCEDURE — 90853 GROUP PSYCHOTHERAPY: CPT | Mod: 95

## 2025-05-14 ENCOUNTER — APPOINTMENT (OUTPATIENT)
Dept: PSYCHIATRY | Facility: CLINIC | Age: 60
End: 2025-05-14
Payer: MEDICAID

## 2025-05-14 PROCEDURE — 90834 PSYTX W PT 45 MINUTES: CPT | Mod: 95

## 2025-05-19 ENCOUNTER — APPOINTMENT (OUTPATIENT)
Dept: PSYCHIATRY | Facility: CLINIC | Age: 60
End: 2025-05-19
Payer: MEDICAID

## 2025-05-19 PROCEDURE — 90853 GROUP PSYCHOTHERAPY: CPT | Mod: 95

## 2025-05-19 PROCEDURE — 90834 PSYTX W PT 45 MINUTES: CPT | Mod: 59

## 2025-05-21 ENCOUNTER — APPOINTMENT (OUTPATIENT)
Dept: PSYCHIATRY | Facility: CLINIC | Age: 60
End: 2025-05-21

## 2025-05-21 DIAGNOSIS — F31.89 OTHER BIPOLAR DISORDER: ICD-10-CM

## 2025-05-21 DIAGNOSIS — F41.1 GENERALIZED ANXIETY DISORDER: ICD-10-CM

## 2025-05-21 PROCEDURE — 99215 OFFICE O/P EST HI 40 MIN: CPT | Mod: 95

## 2025-05-21 PROCEDURE — G2211 COMPLEX E/M VISIT ADD ON: CPT | Mod: NC,95

## 2025-05-28 ENCOUNTER — APPOINTMENT (OUTPATIENT)
Dept: PSYCHIATRY | Facility: CLINIC | Age: 60
End: 2025-05-28
Payer: MEDICAID

## 2025-05-28 PROCEDURE — 90834 PSYTX W PT 45 MINUTES: CPT | Mod: 95

## 2025-06-02 ENCOUNTER — APPOINTMENT (OUTPATIENT)
Dept: PSYCHIATRY | Facility: CLINIC | Age: 60
End: 2025-06-02

## 2025-06-02 ENCOUNTER — APPOINTMENT (OUTPATIENT)
Dept: PSYCHIATRY | Facility: CLINIC | Age: 60
End: 2025-06-02
Payer: MEDICAID

## 2025-06-02 PROCEDURE — 90853 GROUP PSYCHOTHERAPY: CPT | Mod: 95

## 2025-06-02 PROCEDURE — 90834 PSYTX W PT 45 MINUTES: CPT

## 2025-06-04 ENCOUNTER — APPOINTMENT (OUTPATIENT)
Dept: PSYCHIATRY | Facility: CLINIC | Age: 60
End: 2025-06-04
Payer: MEDICAID

## 2025-06-04 PROCEDURE — 90834 PSYTX W PT 45 MINUTES: CPT | Mod: 95

## 2025-06-09 ENCOUNTER — APPOINTMENT (OUTPATIENT)
Dept: PSYCHIATRY | Facility: CLINIC | Age: 60
End: 2025-06-09

## 2025-06-11 ENCOUNTER — APPOINTMENT (OUTPATIENT)
Dept: PSYCHIATRY | Facility: CLINIC | Age: 60
End: 2025-06-11
Payer: MEDICAID

## 2025-06-11 PROCEDURE — 90834 PSYTX W PT 45 MINUTES: CPT | Mod: 95

## 2025-06-16 ENCOUNTER — APPOINTMENT (OUTPATIENT)
Dept: PSYCHIATRY | Facility: CLINIC | Age: 60
End: 2025-06-16

## 2025-06-18 ENCOUNTER — APPOINTMENT (OUTPATIENT)
Dept: PSYCHIATRY | Facility: CLINIC | Age: 60
End: 2025-06-18

## 2025-06-23 ENCOUNTER — APPOINTMENT (OUTPATIENT)
Dept: PSYCHIATRY | Facility: CLINIC | Age: 60
End: 2025-06-23
Payer: MEDICAID

## 2025-06-23 PROCEDURE — 90834 PSYTX W PT 45 MINUTES: CPT | Mod: 59,95

## 2025-06-23 PROCEDURE — 90853 GROUP PSYCHOTHERAPY: CPT | Mod: 95

## 2025-06-25 ENCOUNTER — APPOINTMENT (OUTPATIENT)
Dept: PSYCHIATRY | Facility: CLINIC | Age: 60
End: 2025-06-25
Payer: MEDICAID

## 2025-06-25 PROCEDURE — 90834 PSYTX W PT 45 MINUTES: CPT | Mod: 95

## 2025-06-25 PROCEDURE — G2211 COMPLEX E/M VISIT ADD ON: CPT | Mod: NC,95

## 2025-06-25 PROCEDURE — 99215 OFFICE O/P EST HI 40 MIN: CPT | Mod: 95

## 2025-06-30 ENCOUNTER — APPOINTMENT (OUTPATIENT)
Dept: PSYCHIATRY | Facility: CLINIC | Age: 60
End: 2025-06-30
Payer: MEDICAID

## 2025-06-30 PROCEDURE — 90853 GROUP PSYCHOTHERAPY: CPT | Mod: 95

## 2025-06-30 PROCEDURE — 90834 PSYTX W PT 45 MINUTES: CPT | Mod: 59

## 2025-07-02 ENCOUNTER — NON-APPOINTMENT (OUTPATIENT)
Age: 60
End: 2025-07-02

## 2025-07-02 ENCOUNTER — APPOINTMENT (OUTPATIENT)
Dept: PSYCHIATRY | Facility: CLINIC | Age: 60
End: 2025-07-02

## 2025-07-07 ENCOUNTER — APPOINTMENT (OUTPATIENT)
Dept: PSYCHIATRY | Facility: CLINIC | Age: 60
End: 2025-07-07
Payer: MEDICAID

## 2025-07-07 PROCEDURE — 90834 PSYTX W PT 45 MINUTES: CPT | Mod: 59

## 2025-07-07 PROCEDURE — 90853 GROUP PSYCHOTHERAPY: CPT | Mod: 95

## 2025-07-09 ENCOUNTER — APPOINTMENT (OUTPATIENT)
Dept: PSYCHIATRY | Facility: CLINIC | Age: 60
End: 2025-07-09
Payer: MEDICAID

## 2025-07-09 PROCEDURE — 90834 PSYTX W PT 45 MINUTES: CPT | Mod: 95

## 2025-07-14 ENCOUNTER — APPOINTMENT (OUTPATIENT)
Dept: PSYCHIATRY | Facility: CLINIC | Age: 60
End: 2025-07-14
Payer: MEDICAID

## 2025-07-14 ENCOUNTER — NON-APPOINTMENT (OUTPATIENT)
Age: 60
End: 2025-07-14

## 2025-07-14 PROCEDURE — 90853 GROUP PSYCHOTHERAPY: CPT | Mod: 95

## 2025-07-14 PROCEDURE — 90834 PSYTX W PT 45 MINUTES: CPT | Mod: 59,95

## 2025-07-16 ENCOUNTER — APPOINTMENT (OUTPATIENT)
Dept: PSYCHIATRY | Facility: CLINIC | Age: 60
End: 2025-07-16
Payer: MEDICAID

## 2025-07-16 PROCEDURE — 90834 PSYTX W PT 45 MINUTES: CPT | Mod: 95

## 2025-07-21 ENCOUNTER — APPOINTMENT (OUTPATIENT)
Dept: PSYCHIATRY | Facility: CLINIC | Age: 60
End: 2025-07-21
Payer: MEDICAID

## 2025-07-21 PROCEDURE — 90834 PSYTX W PT 45 MINUTES: CPT

## 2025-07-21 PROCEDURE — 90853 GROUP PSYCHOTHERAPY: CPT | Mod: 95

## 2025-07-23 ENCOUNTER — APPOINTMENT (OUTPATIENT)
Dept: PSYCHIATRY | Facility: CLINIC | Age: 60
End: 2025-07-23
Payer: MEDICAID

## 2025-07-23 PROCEDURE — 90834 PSYTX W PT 45 MINUTES: CPT | Mod: 95

## 2025-07-28 ENCOUNTER — APPOINTMENT (OUTPATIENT)
Dept: PSYCHIATRY | Facility: CLINIC | Age: 60
End: 2025-07-28

## 2025-07-30 ENCOUNTER — APPOINTMENT (OUTPATIENT)
Dept: PSYCHIATRY | Facility: CLINIC | Age: 60
End: 2025-07-30

## 2025-08-04 ENCOUNTER — APPOINTMENT (OUTPATIENT)
Dept: PSYCHIATRY | Facility: CLINIC | Age: 60
End: 2025-08-04

## 2025-08-06 ENCOUNTER — APPOINTMENT (OUTPATIENT)
Dept: PSYCHIATRY | Facility: CLINIC | Age: 60
End: 2025-08-06

## 2025-08-11 ENCOUNTER — APPOINTMENT (OUTPATIENT)
Dept: PSYCHIATRY | Facility: CLINIC | Age: 60
End: 2025-08-11
Payer: MEDICAID

## 2025-08-11 PROCEDURE — 90853 GROUP PSYCHOTHERAPY: CPT | Mod: 95

## 2025-08-11 PROCEDURE — 90834 PSYTX W PT 45 MINUTES: CPT | Mod: 59

## 2025-08-13 ENCOUNTER — APPOINTMENT (OUTPATIENT)
Dept: PSYCHIATRY | Facility: CLINIC | Age: 60
End: 2025-08-13
Payer: MEDICAID

## 2025-08-13 DIAGNOSIS — F41.1 GENERALIZED ANXIETY DISORDER: ICD-10-CM

## 2025-08-13 PROCEDURE — 99214 OFFICE O/P EST MOD 30 MIN: CPT | Mod: 95

## 2025-08-13 PROCEDURE — G2211 COMPLEX E/M VISIT ADD ON: CPT | Mod: NC,95

## 2025-08-13 PROCEDURE — 90834 PSYTX W PT 45 MINUTES: CPT | Mod: 95

## 2025-08-18 ENCOUNTER — APPOINTMENT (OUTPATIENT)
Dept: PSYCHIATRY | Facility: CLINIC | Age: 60
End: 2025-08-18
Payer: MEDICAID

## 2025-08-18 PROCEDURE — 90853 GROUP PSYCHOTHERAPY: CPT | Mod: 95

## 2025-08-18 PROCEDURE — 90834 PSYTX W PT 45 MINUTES: CPT | Mod: 59,95

## 2025-08-21 ENCOUNTER — APPOINTMENT (OUTPATIENT)
Dept: PSYCHIATRY | Facility: CLINIC | Age: 60
End: 2025-08-21
Payer: MEDICAID

## 2025-08-21 ENCOUNTER — NON-APPOINTMENT (OUTPATIENT)
Age: 60
End: 2025-08-21

## 2025-08-21 PROCEDURE — 90834 PSYTX W PT 45 MINUTES: CPT | Mod: 95

## 2025-08-25 ENCOUNTER — APPOINTMENT (OUTPATIENT)
Dept: PSYCHIATRY | Facility: CLINIC | Age: 60
End: 2025-08-25
Payer: MEDICAID

## 2025-08-25 ENCOUNTER — APPOINTMENT (OUTPATIENT)
Dept: SURGERY | Facility: CLINIC | Age: 60
End: 2025-08-25
Payer: MEDICAID

## 2025-08-25 ENCOUNTER — NON-APPOINTMENT (OUTPATIENT)
Age: 60
End: 2025-08-25

## 2025-08-25 VITALS
WEIGHT: 224 LBS | HEIGHT: 71 IN | HEART RATE: 80 BPM | OXYGEN SATURATION: 98 % | BODY MASS INDEX: 31.36 KG/M2 | DIASTOLIC BLOOD PRESSURE: 86 MMHG | TEMPERATURE: 97.6 F | SYSTOLIC BLOOD PRESSURE: 123 MMHG

## 2025-08-25 DIAGNOSIS — F10.90 ALCOHOL USE, UNSPECIFIED, UNCOMPLICATED: ICD-10-CM

## 2025-08-25 DIAGNOSIS — K42.9 UMBILICAL HERNIA W/OUT OBSTRUCTION OR GANGRENE: ICD-10-CM

## 2025-08-25 DIAGNOSIS — K43.9 VENTRAL HERNIA W/OUT OBSTRUCTION OR GANGRENE: ICD-10-CM

## 2025-08-25 DIAGNOSIS — E66.01 MORBID (SEVERE) OBESITY DUE TO EXCESS CALORIES: ICD-10-CM

## 2025-08-25 PROCEDURE — 90834 PSYTX W PT 45 MINUTES: CPT | Mod: 59,95

## 2025-08-25 PROCEDURE — 90853 GROUP PSYCHOTHERAPY: CPT | Mod: 95

## 2025-08-25 PROCEDURE — 99204 OFFICE O/P NEW MOD 45 MIN: CPT

## 2025-08-27 ENCOUNTER — APPOINTMENT (OUTPATIENT)
Dept: PSYCHIATRY | Facility: CLINIC | Age: 60
End: 2025-08-27
Payer: MEDICAID

## 2025-08-27 PROBLEM — F10.90 ALCOHOL USE: Status: ACTIVE | Noted: 2025-08-25

## 2025-08-27 PROBLEM — K42.9 UMBILICAL HERNIA: Status: ACTIVE | Noted: 2025-08-25

## 2025-08-27 PROBLEM — E66.01 OBESITY, MORBID: Status: ACTIVE | Noted: 2025-08-25

## 2025-08-27 PROCEDURE — 90834 PSYTX W PT 45 MINUTES: CPT | Mod: 95

## 2025-09-03 ENCOUNTER — APPOINTMENT (OUTPATIENT)
Dept: PSYCHIATRY | Facility: CLINIC | Age: 60
End: 2025-09-03
Payer: MEDICAID

## 2025-09-03 PROCEDURE — 90834 PSYTX W PT 45 MINUTES: CPT | Mod: 95

## 2025-09-05 ENCOUNTER — RESULT REVIEW (OUTPATIENT)
Age: 60
End: 2025-09-05

## 2025-09-07 ENCOUNTER — OUTPATIENT (OUTPATIENT)
Dept: OUTPATIENT SERVICES | Facility: HOSPITAL | Age: 60
LOS: 1 days | End: 2025-09-07

## 2025-09-08 ENCOUNTER — APPOINTMENT (OUTPATIENT)
Dept: PSYCHIATRY | Facility: CLINIC | Age: 60
End: 2025-09-08
Payer: MEDICAID

## 2025-09-08 DIAGNOSIS — F41.1 GENERALIZED ANXIETY DISORDER: ICD-10-CM

## 2025-09-08 PROCEDURE — 99213 OFFICE O/P EST LOW 20 MIN: CPT | Mod: 95

## 2025-09-08 PROCEDURE — 90853 GROUP PSYCHOTHERAPY: CPT | Mod: 95

## 2025-09-08 PROCEDURE — G2211 COMPLEX E/M VISIT ADD ON: CPT | Mod: NC,95

## 2025-09-08 PROCEDURE — 90834 PSYTX W PT 45 MINUTES: CPT | Mod: 59,95

## 2025-09-10 ENCOUNTER — APPOINTMENT (OUTPATIENT)
Dept: SURGERY | Facility: CLINIC | Age: 60
End: 2025-09-10
Payer: MEDICAID

## 2025-09-10 ENCOUNTER — APPOINTMENT (OUTPATIENT)
Dept: PSYCHIATRY | Facility: CLINIC | Age: 60
End: 2025-09-10
Payer: MEDICAID

## 2025-09-10 VITALS
TEMPERATURE: 98.1 F | BODY MASS INDEX: 32.06 KG/M2 | DIASTOLIC BLOOD PRESSURE: 76 MMHG | WEIGHT: 229 LBS | HEART RATE: 80 BPM | SYSTOLIC BLOOD PRESSURE: 116 MMHG | HEIGHT: 71 IN | OXYGEN SATURATION: 100 %

## 2025-09-10 DIAGNOSIS — K43.9 VENTRAL HERNIA W/OUT OBSTRUCTION OR GANGRENE: ICD-10-CM

## 2025-09-10 PROCEDURE — G2211 COMPLEX E/M VISIT ADD ON: CPT

## 2025-09-10 PROCEDURE — 90834 PSYTX W PT 45 MINUTES: CPT | Mod: 95

## 2025-09-10 PROCEDURE — 99214 OFFICE O/P EST MOD 30 MIN: CPT

## 2025-09-15 ENCOUNTER — APPOINTMENT (OUTPATIENT)
Dept: PSYCHIATRY | Facility: CLINIC | Age: 60
End: 2025-09-15
Payer: MEDICAID

## 2025-09-15 PROCEDURE — 90834 PSYTX W PT 45 MINUTES: CPT | Mod: 59

## 2025-09-15 PROCEDURE — 90853 GROUP PSYCHOTHERAPY: CPT | Mod: 95

## 2025-09-17 ENCOUNTER — APPOINTMENT (OUTPATIENT)
Dept: PSYCHIATRY | Facility: CLINIC | Age: 60
End: 2025-09-17